# Patient Record
Sex: FEMALE | Race: WHITE | NOT HISPANIC OR LATINO | Employment: OTHER | URBAN - METROPOLITAN AREA
[De-identification: names, ages, dates, MRNs, and addresses within clinical notes are randomized per-mention and may not be internally consistent; named-entity substitution may affect disease eponyms.]

---

## 2017-01-09 ENCOUNTER — COMMUNICATION - HEALTHEAST (OUTPATIENT)
Dept: SCHEDULING | Facility: CLINIC | Age: 82
End: 2017-01-09

## 2019-03-27 ENCOUNTER — AMBULATORY - HEALTHEAST (OUTPATIENT)
Dept: OTHER | Facility: CLINIC | Age: 84
End: 2019-03-27

## 2019-03-28 ENCOUNTER — AMBULATORY - HEALTHEAST (OUTPATIENT)
Dept: OTHER | Facility: CLINIC | Age: 84
End: 2019-03-28

## 2019-03-29 ENCOUNTER — COMMUNICATION - HEALTHEAST (OUTPATIENT)
Dept: NEUROSURGERY | Facility: CLINIC | Age: 84
End: 2019-03-29

## 2019-03-29 DIAGNOSIS — S32.009A FRACTURE OF LUMBAR VERTEBRA (H): ICD-10-CM

## 2019-04-04 ENCOUNTER — RECORDS - HEALTHEAST (OUTPATIENT)
Dept: LAB | Facility: CLINIC | Age: 84
End: 2019-04-04

## 2019-04-05 ENCOUNTER — RECORDS - HEALTHEAST (OUTPATIENT)
Dept: LAB | Facility: CLINIC | Age: 84
End: 2019-04-05

## 2019-04-05 LAB
ANION GAP SERPL CALCULATED.3IONS-SCNC: 9 MMOL/L (ref 5–18)
BUN SERPL-MCNC: 10 MG/DL (ref 8–28)
CALCIUM SERPL-MCNC: 10.4 MG/DL (ref 8.5–10.5)
CHLORIDE BLD-SCNC: 102 MMOL/L (ref 98–107)
CO2 SERPL-SCNC: 24 MMOL/L (ref 22–31)
CREAT SERPL-MCNC: 0.73 MG/DL (ref 0.6–1.1)
ERYTHROCYTE [DISTWIDTH] IN BLOOD BY AUTOMATED COUNT: 13.3 % (ref 11–14.5)
GFR SERPL CREATININE-BSD FRML MDRD: >60 ML/MIN/1.73M2
GLUCOSE BLD-MCNC: 97 MG/DL (ref 70–125)
HCT VFR BLD AUTO: 34 % (ref 35–47)
HGB BLD-MCNC: 11 G/DL (ref 12–16)
MCH RBC QN AUTO: 29.5 PG (ref 27–34)
MCHC RBC AUTO-ENTMCNC: 32.4 G/DL (ref 32–36)
MCV RBC AUTO: 91 FL (ref 80–100)
PLATELET # BLD AUTO: 268 THOU/UL (ref 140–440)
PMV BLD AUTO: 11 FL (ref 8.5–12.5)
POTASSIUM BLD-SCNC: 4.3 MMOL/L (ref 3.5–5)
RBC # BLD AUTO: 3.73 MILL/UL (ref 3.8–5.4)
SODIUM SERPL-SCNC: 135 MMOL/L (ref 136–145)
WBC: 13.1 THOU/UL (ref 4–11)

## 2019-04-07 LAB — BACTERIA SPEC CULT: NORMAL

## 2019-04-12 ENCOUNTER — HOSPITAL ENCOUNTER (OUTPATIENT)
Dept: RADIOLOGY | Facility: CLINIC | Age: 84
Discharge: HOME | End: 2019-04-12
Attending: SURGERY

## 2019-04-12 ENCOUNTER — OFFICE VISIT - HEALTHEAST (OUTPATIENT)
Dept: NEUROSURGERY | Facility: CLINIC | Age: 84
End: 2019-04-12

## 2019-04-12 DIAGNOSIS — S32.001D OPEN BURST FRACTURE OF LUMBAR VERTEBRA WITH ROUTINE HEALING, SUBSEQUENT ENCOUNTER: ICD-10-CM

## 2019-04-12 DIAGNOSIS — S32.009A FRACTURE OF LUMBAR VERTEBRA (H): ICD-10-CM

## 2019-04-22 ENCOUNTER — COMMUNICATION - HEALTHEAST (OUTPATIENT)
Dept: NEUROSURGERY | Facility: CLINIC | Age: 84
End: 2019-04-22

## 2019-04-23 ENCOUNTER — COMMUNICATION - HEALTHEAST (OUTPATIENT)
Dept: NEUROSURGERY | Facility: CLINIC | Age: 84
End: 2019-04-23

## 2019-04-23 ENCOUNTER — AMBULATORY - HEALTHEAST (OUTPATIENT)
Dept: NEUROSURGERY | Facility: CLINIC | Age: 84
End: 2019-04-23

## 2019-04-23 DIAGNOSIS — S32.049A L4 VERTEBRAL FRACTURE (H): ICD-10-CM

## 2019-04-23 DIAGNOSIS — S32.041D CLOSED STABLE BURST FRACTURE OF FOURTH LUMBAR VERTEBRA WITH ROUTINE HEALING, SUBSEQUENT ENCOUNTER: ICD-10-CM

## 2019-04-30 ENCOUNTER — COMMUNICATION - HEALTHEAST (OUTPATIENT)
Dept: NEUROSURGERY | Facility: CLINIC | Age: 84
End: 2019-04-30

## 2019-05-03 ENCOUNTER — COMMUNICATION - HEALTHEAST (OUTPATIENT)
Dept: NEUROSURGERY | Facility: CLINIC | Age: 84
End: 2019-05-03

## 2019-05-03 DIAGNOSIS — S32.041D CLOSED STABLE BURST FRACTURE OF FOURTH LUMBAR VERTEBRA WITH ROUTINE HEALING, SUBSEQUENT ENCOUNTER: ICD-10-CM

## 2019-05-07 ENCOUNTER — HOSPITAL ENCOUNTER (OUTPATIENT)
Dept: CT IMAGING | Facility: HOSPITAL | Age: 84
Discharge: HOME | End: 2019-05-07

## 2019-05-07 ENCOUNTER — HOSPITAL ENCOUNTER (OUTPATIENT)
Dept: MRI IMAGING | Facility: HOSPITAL | Age: 84
Discharge: HOME | End: 2019-05-07

## 2019-05-07 DIAGNOSIS — S32.041D CLOSED STABLE BURST FRACTURE OF FOURTH LUMBAR VERTEBRA WITH ROUTINE HEALING, SUBSEQUENT ENCOUNTER: ICD-10-CM

## 2019-05-09 ENCOUNTER — RECORDS - HEALTHEAST (OUTPATIENT)
Dept: ADMINISTRATIVE | Facility: OTHER | Age: 84
End: 2019-05-09

## 2019-05-09 ENCOUNTER — OFFICE VISIT - HEALTHEAST (OUTPATIENT)
Dept: NEUROSURGERY | Facility: CLINIC | Age: 84
End: 2019-05-09

## 2019-05-09 DIAGNOSIS — M80.88XD OSTEOPOROTIC COMPRESSION FRACTURE OF SPINE WITH ROUTINE HEALING, SUBSEQUENT ENCOUNTER: ICD-10-CM

## 2019-06-03 ENCOUNTER — HOSPITAL ENCOUNTER (OUTPATIENT)
Dept: RADIOLOGY | Facility: HOSPITAL | Age: 84
Discharge: HOME | End: 2019-06-03
Attending: SURGERY

## 2019-06-03 DIAGNOSIS — S32.049A L4 VERTEBRAL FRACTURE (H): ICD-10-CM

## 2019-06-06 ENCOUNTER — OFFICE VISIT - HEALTHEAST (OUTPATIENT)
Dept: NEUROSURGERY | Facility: CLINIC | Age: 84
End: 2019-06-06

## 2019-06-06 DIAGNOSIS — S32.049A CLOSED FRACTURE OF FOURTH LUMBAR VERTEBRA, UNSPECIFIED FRACTURE MORPHOLOGY, INITIAL ENCOUNTER (H): ICD-10-CM

## 2019-06-06 DIAGNOSIS — S32.041D CLOSED STABLE BURST FRACTURE OF FOURTH LUMBAR VERTEBRA WITH ROUTINE HEALING, SUBSEQUENT ENCOUNTER: ICD-10-CM

## 2019-06-30 ENCOUNTER — COMMUNICATION - HEALTHEAST (OUTPATIENT)
Dept: NEUROSURGERY | Facility: CLINIC | Age: 84
End: 2019-06-30

## 2019-06-30 DIAGNOSIS — S32.049A CLOSED FRACTURE OF FOURTH LUMBAR VERTEBRA, UNSPECIFIED FRACTURE MORPHOLOGY, INITIAL ENCOUNTER (H): ICD-10-CM

## 2019-07-15 ENCOUNTER — COMMUNICATION - HEALTHEAST (OUTPATIENT)
Dept: NEUROSURGERY | Facility: CLINIC | Age: 84
End: 2019-07-15

## 2019-08-01 ENCOUNTER — OFFICE VISIT - HEALTHEAST (OUTPATIENT)
Dept: NEUROSURGERY | Facility: CLINIC | Age: 84
End: 2019-08-01

## 2019-08-01 ENCOUNTER — HOSPITAL ENCOUNTER (OUTPATIENT)
Dept: CT IMAGING | Facility: HOSPITAL | Age: 84
Discharge: HOME | End: 2019-08-01

## 2019-08-01 DIAGNOSIS — M81.0 SENILE OSTEOPOROSIS: ICD-10-CM

## 2019-08-01 DIAGNOSIS — S32.049A CLOSED FRACTURE OF FOURTH LUMBAR VERTEBRA, UNSPECIFIED FRACTURE MORPHOLOGY, INITIAL ENCOUNTER (H): ICD-10-CM

## 2019-08-01 DIAGNOSIS — S32.041D CLOSED STABLE BURST FRACTURE OF FOURTH LUMBAR VERTEBRA WITH ROUTINE HEALING, SUBSEQUENT ENCOUNTER: ICD-10-CM

## 2019-08-01 ASSESSMENT — MIFFLIN-ST. JEOR: SCORE: 831.44

## 2019-08-02 ENCOUNTER — COMMUNICATION - HEALTHEAST (OUTPATIENT)
Dept: NEUROSURGERY | Facility: CLINIC | Age: 84
End: 2019-08-02

## 2019-08-02 ENCOUNTER — AMBULATORY - HEALTHEAST (OUTPATIENT)
Dept: NEUROSURGERY | Facility: CLINIC | Age: 84
End: 2019-08-02

## 2019-08-02 DIAGNOSIS — S32.049A: ICD-10-CM

## 2019-11-07 ENCOUNTER — RECORDS - HEALTHEAST (OUTPATIENT)
Dept: ADMINISTRATIVE | Facility: OTHER | Age: 84
End: 2019-11-07

## 2019-12-27 ENCOUNTER — RECORDS - HEALTHEAST (OUTPATIENT)
Dept: ADMINISTRATIVE | Facility: OTHER | Age: 84
End: 2019-12-27

## 2019-12-30 ENCOUNTER — RECORDS - HEALTHEAST (OUTPATIENT)
Dept: ADMINISTRATIVE | Facility: OTHER | Age: 84
End: 2019-12-30

## 2020-10-22 ENCOUNTER — COMMUNICATION - HEALTHEAST (OUTPATIENT)
Dept: SCHEDULING | Facility: CLINIC | Age: 85
End: 2020-10-22

## 2020-10-23 ENCOUNTER — HOME CARE/HOSPICE - HEALTHEAST (OUTPATIENT)
Dept: HOME HEALTH SERVICES | Facility: HOME HEALTH | Age: 85
End: 2020-10-23

## 2020-10-23 ENCOUNTER — COMMUNICATION - HEALTHEAST (OUTPATIENT)
Dept: NEUROSURGERY | Facility: CLINIC | Age: 85
End: 2020-10-23

## 2020-10-23 DIAGNOSIS — S22.089A CLOSED T11 FRACTURE (H): ICD-10-CM

## 2021-02-09 ENCOUNTER — COMMUNICATION - HEALTHEAST (OUTPATIENT)
Dept: SCHEDULING | Facility: CLINIC | Age: 86
End: 2021-02-09

## 2021-02-12 ENCOUNTER — HOME CARE/HOSPICE - HEALTHEAST (OUTPATIENT)
Dept: HOME HEALTH SERVICES | Facility: HOME HEALTH | Age: 86
End: 2021-02-12

## 2021-03-19 ENCOUNTER — COMMUNICATION - HEALTHEAST (OUTPATIENT)
Dept: ADMINISTRATIVE | Facility: CLINIC | Age: 86
End: 2021-03-19

## 2021-03-24 ENCOUNTER — COMMUNICATION - HEALTHEAST (OUTPATIENT)
Dept: SCHEDULING | Facility: CLINIC | Age: 86
End: 2021-03-24

## 2021-04-16 ENCOUNTER — RECORDS - HEALTHEAST (OUTPATIENT)
Dept: ADMINISTRATIVE | Facility: OTHER | Age: 86
End: 2021-04-16

## 2021-05-09 ENCOUNTER — HOSPITAL ENCOUNTER (OUTPATIENT)
Dept: CT IMAGING | Facility: HOSPITAL | Age: 86
Discharge: HOME | End: 2021-05-09
Attending: UROLOGY
Payer: COMMERCIAL

## 2021-05-09 DIAGNOSIS — N20.1 URETERIC STONE: ICD-10-CM

## 2021-05-09 DIAGNOSIS — N20.1 CALCULUS OF URETER: ICD-10-CM

## 2021-05-27 ENCOUNTER — RECORDS - HEALTHEAST (OUTPATIENT)
Dept: ADMINISTRATIVE | Facility: CLINIC | Age: 86
End: 2021-05-27

## 2021-05-27 NOTE — PROGRESS NOTES
"Patient was seen at [place] [facility] for the evaluation for a custom TLSO.  Patient presents as a 82 y/o female, 5'1\", 88 lbs. Dx: L4 burst fracture.  Rx for custom TLSO ordered by Dr. Tracey Miller MD.  Measurements were taken while supine in bed without incident and TLSO will be ready to fit tomorrow morning (3/28).     We are experiencing issues with receiving orders via fax currently.  The issue begin yesterday and has continued into this morning.  [employer] is currently working to resolve this issue. Please confirm orders via email or phone until issue is resolved. Thank you.   "

## 2021-05-27 NOTE — PROGRESS NOTES
CHART NOTE     1935     DATE OF SERVICE: 4/12/2019     FOLLOW UP EVALUATION:      ASSESSMENT AND PLAN:  Shirley Koehler is an 83-year-old female with a past medical history significant for hypertension, GERD, aortic stenosis, and depression who presented to the emergency department for evaluation of low back and right hip pain on March 27, 2019.  Imaging revealed a L4 burst fracture, she was placed in a brace.  Today, she presents un accompanied w/o a coat--it's snowing out.  She reports minimal pain, reports wearing brace. She does report occasional right leg pain, but she does not want any work up. She's at TCU. Her imaging and exam are stable. Plan follow up in 6 weeks w repeat imaging, she will continue brace when upright or ambulatory.  I called her son Jay to let him know that she had arrived w/o appropriate outer wear reflective of weather, and recommended that a family member accompany her to her appointments.         PAST MEDICAL HISTORY, SURGICAL HISTORY, REVIEW OF SYMPTOMS, MEDICATIONS AND ALLERGIES:  Past medical history, surgical history, review of symptoms, medications and allergies remain unchanged.       PHYSICAL EXAM:  Neurological exam reveals:  Alert and oriented x3, speech fluent and appropriate.   PERRL, EOMI, face symmetric, tongue midline, shoulder shrug equal  No pronator drift, finger to nose smooth and accurate bilaterally  Arm strength bilateral grasp, biceps, triceps, and deltoids 5/5   Leg strength bilateral dorsiflexion, plantar flexion, and hip flexion 5/5  Muscle Bulk and tone wnl.   Reflexes:No pathological reflexes   Gait and station: She is ambulatory w walker        RADIOGRAPHIC IMAGING: I personally reviewed any new diagnostic studies.   There is unchanged 22 degrees dextro convex curvature from L1-L4. Unchanged appearance of the L1 compression fracture. Unchanged compression fracture of the inferior bony endplate of L4.

## 2021-05-27 NOTE — PROGRESS NOTES
"S: Patient is a 82 y/o female, 5'1\", 88 lbs seen today at [facility], room 413, for the fitting and delivery of her custom TLSO on orders from Dr. Tracey Miller MD for the treatment of Dx: L4 burst fracture.    O/G: Patient presents supine in bed with pain managed. Custom TLSO is designed to provide support for fracture, tri-planar control, increase intrabdominal pressure, and facilitate the healing process. Patient requires customization due to the severity of her burst fracture.    A: I fit the patient while she remained supine on her hospital bed. Patient was log-rolled with the aid of hospital staff in order to properly don her custom TLSO. Patient's TLSO fit was optimal after donning was complete. Patient and I discussed care and use of her TLSO during our appointment today and she was provided written care, use and donning instructions from the . Patient reported that she understood the instructions she was given and that all of her questions were answered during the course of our visit today.     P: Patient was given my card and asked to call our office if there are any issues or questions regarding the fit/function of her custom TLSO in the future.   "

## 2021-05-28 NOTE — TELEPHONE ENCOUNTER
PT's nurse called with the question about TLSO brace. PT wondering if she needs to put the brace on at night when she is getting up to go to the bathroom. Please call Erika her nurse back @ [phone]

## 2021-05-28 NOTE — TELEPHONE ENCOUNTER
PT called requesting for pain medication as she is in a lot of pain from her FX. Please call the PT back @ [phone]

## 2021-05-28 NOTE — TELEPHONE ENCOUNTER
HC nurse called requesting refill of Dilaudid.  She was last seen by me on 4/12--at that time she reported minimal pain.  I spent over a hour w her the day of her visit--walked her --she denied pain.   She called on 4/26 and was given a rx for 25 Dilaudid--I do not see a note from the provider that provided refill.  Review of her  is concerning for opioid abuse given the fact that she has a significant depression hx.   I spoke to the patient w Kady caldera.  She was intermittently laughing and crying.  She reported severe pain in the am w getting up.  She reports right sided pain.  She denies worsening weakness. No issues w bowel or bladder.  She has been dc'd from TCU .  I told her that I would provide a conservative number of Dilaudid until she can be seen by us.  She needs to follow up w us next week w CT lumbar spine and MRI w/o lumbar spine to assess right leg pain.  If her pain is such that she's requiring this much Dilaudid at this point, she would be a candidate for vertebroplasty.

## 2021-05-28 NOTE — PROGRESS NOTES
CHART NOTE     1935     DATE OF SERVICE: 5/11/2019     FOLLOW UP EVALUATION:      ASSESSMENT AND PLAN:Shirley Koehler is an 83-year-old female with a past medical history significant for hypertension, GERD, aortic stenosis, and depression who presented to the emergency department for evaluation of low back and right hip pain on March 27, 2019.  Imaging revealed a new L4 burst fracture, she was placed in a brace.   During her last visit she reported occasional right leg pain, but she refused any work up.  She reported minimal back pain and compliance w brace. Her imaging and exam were stable. A few days later she called our office reporting pain and was provided a RX for Dilaudid by a colleague.  She called a week later asking for another refill of Dilaudid.  I provided small amt and told her that she needed imaging and to be seen in follow up. I ordered MRI and CT and follow up.  She presents today accompanied by her son.  Ms Phipps reports severe right groin pain, worse in the am and w ambulation.  She has a tough time getting up and going.  She's perseverating about getting a refill of Dilaudid, it's difficult to get any hx, as she only wants to talk about refill of Dilaudid.  I rev'd her imaging w Dr Hamilton. She has the old L1 fx ,which is stable--although the foramen looks  tight on MRI--on CT which provides a better look at the foramen, she is not. At L 4, the site of her most recent fracture there is bilateral foraminal stenosis worse on the right, but her pain is not in a L 4 distribution. The L 4 fracture is stable in alignment--on CT little evidence of healing. 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 I feel that her symptoms are most consistent w the right hip degeneration.  I spent focused time examining her back, she had no pain with point pressure ot light thumping on her spine.  I discussed with the patient and her son that I could not continue to provide pain medication for a condition that I was  not treating ie the hip, and that she would be better served to see orthopedic provider.  I explained that taking Dilaudid is not a long term solution.  I called her primary doctor Dr Thomosn, I discussed my concerns and my limitations to treat something for which I'm not qualified to treat(her hip). Dr Thomson and I both agreed that patient w significant addition risk due to her depression and ongoing emotional distraught regarding the death of her son. Dr Thomson will facilitate follow up w patient. She will continue to follow up w us for her L 4 fracture, we will see her back in 6 weeks w repeat imaging.          PAST MEDICAL HISTORY, SURGICAL HISTORY, REVIEW OF SYMPTOMS, MEDICATIONS AND ALLERGIES:  Past medical history, surgical history, review of symptoms, medications and allergies remain unchanged.    Vitals:    05/09/19 1100   BP: 118/74   Pulse: 68   Resp: 18        PHYSICAL EXAM:  Neurological exam reveals:  Alert and oriented x3, speech fluent and appropriate.   PERRL, EOMI, face symmetric, tongue midline, shoulder shrug equal  No pronator drift, finger to nose smooth and accurate bilaterally  Arm strength bilateral grasp, biceps, triceps, and deltoids 5/5   Leg strength bilateral dorsiflexion, plantar flexion, and hip flexion 5/5  Muscle Bulk and tone wnl.   Reflexes:No pathological reflexes   Gait and station:Pain w standing--unsteady gait.      RADIOGRAPHIC IMAGING: I personally reviewed any new diagnostic studies.   No significant change in the severe compression fracture of L1 with stable 4 mm retropulsion of the posterior superior endplate of L1 into the spinal canal. Mild spinal canal narrowing at T12-L1. Stable 26 degrees of segmental kyphosis from T12 to L2.   2.  Approximately 30% compression fracture of inferior endplate of L4, slightly worsened since prior lumbar spine CT 03/26/2019. The fracture plane is still present, suggestive of ongoing or nonhealed fracture. There is 2 mm retropulsion of the  posterior  inferior endplate of L4 into the spinal canal.   3.  Mild spinal canal narrowing at L4-L5. Narrowing of the left lateral recess at L3-L4.  4.  Moderate right and mild to moderate left neural foraminal narrowing at L4-L5. Mild left neural foraminal narrowing at L3-L4.

## 2021-05-28 NOTE — TELEPHONE ENCOUNTER
Shirley was last seen on 4/12/2019 for L4 fracture. She is requesting another hydromorphone prescription as she is still experiencing a moderate mid back pain associated with standing and walking. Takes 1-2 hydromorphone a day. Can not take hydrocodone or Tramadol.   Will send Rx to [facility].  Kady Ly RN, CNRN

## 2021-05-28 NOTE — TELEPHONE ENCOUNTER
Nurse calling on behalf of PT asking questions about how the brace is being put on. Please call the PT's son, jonny Murray @ [phone]. If he does not answer please call the nurse, Leslie @ [phone].

## 2021-05-28 NOTE — TELEPHONE ENCOUNTER
Left another message for pt's son to call us back.   Was unable to leave message for Leslie.  Kady Ly RN, CNRN

## 2021-05-28 NOTE — TELEPHONE ENCOUNTER
PT called to set up F/U and there is instruction to have repeat imaging prior to appt but no orders placed. Please review.

## 2021-05-29 NOTE — PROGRESS NOTES
CHART NOTE     1935     DATE OF SERVICE: 6/6/2019     FOLLOW UP EVALUATION:      ASSESSMENT AND PLAN: Some mid low thoracic back pain today, low back pain seems improved, she complains of no right groin pain or leg pain for me. She would like to know what else she can take that is stronger than ibuprofen for pain. Her son is present.     Plan:  1. She can increase her gabapentin to 200mg three times a day, monitor for side effects  2. Would continue the brace given her mid thoracic back pain and get a CT on 4 week follow up, if she has a thoracic fracture the treatment would be the same as her L4 fracture  3. CT of thoracic and lumbar spine in four weeks, appt in trauma clinic to follow  4. If in four weeks back pain is minimal, no new issues on CT, some interval sclerosis or healing - hopefully we can discharge her from the clinic then  5. I told her we would not prescribe more narcotics, she should be taking tylenol 1000mg three times a day, ibuprofen sparingly, ice/heat, lidocaine patches. She seems disappointed I won't refill narcotics but her son is in agreement  6. Recommend continued discussion with patient regarding narcotics are not indicated for long term treatment of pain.     Interval events: No low back pain, chronic hip and knee pain. Complaining of some mid lower thoracic pain. In clinic today she seems a little anxious. She describes she was up all night with the pain, has been taking ibuprofen which has not been very helpful. She would like something else for pain. She has seen her pcp and has described the same issues. PCP notes reviewed. PCP notes will not refill narcotics and I have told her we will not either.     HPI: Shirley Koehler is an 83-year-old female with a past medical history significant for hypertension, GERD, aortic stenosis, and depression who presented to the emergency department for evaluation of low back and right hip pain on March 27, 2019.  Imaging revealed a new L4  burst fracture, she was placed in a brace.   During her last visit she reported severe right hip and right knee pain. She has not yet seen [facility] for this. Her son has been staying with her and this has been helpful.         PAST MEDICAL HISTORY, SURGICAL HISTORY, REVIEW OF SYMPTOMS, MEDICATIONS AND ALLERGIES:  Past medical history, surgical history, review of symptoms, medications and allergies remain unchanged.    Vitals:    06/06/19 1119   BP: 159/85   Pulse: 87   Resp: 18   SpO2: 98%        PHYSICAL EXAM:  Neurological exam reveals:  Alert and oriented x3, speech fluent and appropriate.   PERRL, EOMI, face symmetric, tongue midline, shoulder shrug equal  No pronator drift, finger to nose smooth and accurate bilaterally  Arm strength bilateral grasp, biceps, triceps, and deltoids 5/5   Leg strength bilateral dorsiflexion, plantar flexion, and hip flexion 5/5  Muscle Bulk and tone wnl.   Reflexes:No pathological reflexes   Gait and station: normal with walker.     RADIOGRAPHIC IMAGING: I personally reviewed any new diagnostic studies.      EXAM: XR LUMBAR SPINE 2 OR 3 VWS  LOCATION: [facility]  DATE/TIME: 6/3/2019 11:17 AM     INDICATION: Follow-up L4 compression fracture.  COMPARISON: MRI 5/7/2019.     FINDINGS: 5 lumbar type vertebral bodies. Generalized osteopenia. Films upright in brace. Stable mild compression deformity along the inferior bony endplate of L4. Stable severe L1 compression fracture with associated accentuated thoracolumbar kyphosis.   Stable moderate right lumbar curvature.    Clara Mitchell [facility]surgery  O: [phone]  P: [phone]

## 2021-05-29 NOTE — PATIENT INSTRUCTIONS - HE
Since you still have back pain, I would recommend continuing your brace and restrictions. We will send for a CT scan in four weeks to look for interval healing of your L4 fracture. You can try magnesium and turmeric for pain or lidocaine patches or cream, ice or heat. Tylenol 1000mg three times a day. You can increase your gabapentin to 200mg three times a day. I would not recommend narcotics for pain at this point.       WEARING THE LUMBAR BRACE:    * Wear the brace when out of bed or sitting upright in bed.  * You should apply the brace before standing.  * You may shower seated without brace.   Sit down on shower chair, remove brace   Shower   Dry   Re-apply brace before standing.   Take care not to fall  *If your brace is not fitting call [facility] [phone]  *Check  your incision and the skin under your brace at least daily.

## 2021-05-30 ENCOUNTER — RECORDS - HEALTHEAST (OUTPATIENT)
Dept: ADMINISTRATIVE | Facility: CLINIC | Age: 86
End: 2021-05-30

## 2021-05-30 NOTE — TELEPHONE ENCOUNTER
7/16/19 LMTCB. If she returns call in time, Dr. Marc still has a couple of appts on Thursday's (7/18/19) trauma clinic. We can offer her one of those appts, otherwise, next trauma clinic is 8/1/19.

## 2021-05-30 NOTE — TELEPHONE ENCOUNTER
KYAW cronin f/u in trauma clinic for discussion of CT scan and next step of treatment.  Kady Ly RN, CNRN

## 2021-05-31 NOTE — PROGRESS NOTES
Shriley is here to f/u on new imaging regarding her fracture. She is currently not wearing a brace because she was running late today. She does c/o some back pain, bilateral hip pain and right knee pain. She states then pain is nothing that she cant handle.   Ton,CHIDI

## 2021-05-31 NOTE — PATIENT INSTRUCTIONS - HE
Pt has graduated from Neurosurgery.   Pt was offered in home PT for balance and gait training. Pt is going to think about it. She can call our office at [phone] if she would like to proceed and we can order it.   Dr. Marc will refill gabapentin or thigh pain.   Driving assesment information given.

## 2021-05-31 NOTE — TELEPHONE ENCOUNTER
Gabapentin went to mail order pharmacy. Writer sent new prescription to [facility] in [place] as patient requested. Mail order Rx cancelled.  Patient has been called and verbalized understanding  Ayleen Kee LPN

## 2021-05-31 NOTE — TELEPHONE ENCOUNTER
Patient saw Dr. Marc yesterday and was told a prescription would be sent to the [facility] in [place]. Patients states it is not there. Please call her back at [phone]

## 2021-05-31 NOTE — PROGRESS NOTES
Neurosurgery Progress Note  08/01/19    A/P: Shirley Phipps is a 83 y.o. female who has a chronic L1 burst fracture, evidence of osteoporosis and experienced a ground-level fall in late March 2019 with subsequent development of a L4 burst fracture    Refill gabapentin  Offered in-home physical therapy for balance and gait training, however patient is not ready to commit and she will contact us if she like to proceed.  Offered occupational therapy assessment for resuming driving    L4 burst fracture stable on CT.  No follow-up with neurosurgery as required for her fracture.  However she does have a component of possible lumbar radiculopathy, for which she should call us or return to clinic if she is having worsening symptoms.  At present, she like to continue with oral pain medication for treatment    S: Ms. Phipps returns to clinic today with complaints of her right hip and right knee bothering her.  There is a small component of radiating pain.  She continues to take ibuprofen and gabapentin.  She denies any new numbness, tingling, weakness.    PMH: No interval change  PSH: No interval change    ROS: Denies any changes in her bowel or bladder habits. A full 14 point review of systems was otherwise completed and is negative aside from that mentioned above in the HPI    O:  Vitals:    08/01/19 1253   BP: 134/78   Pulse: 74   SpO2: 97%     General: Awake, alert, NAD  HEENT: AT/NC, EOMI, face symmetric, oral mucosa moist and pink  Heart: RRR  Lungs: Nonlabored respirations without accessory muscle use.  Neuro: Awake, alert, speech is clear and content is appropriate. MAEW x 4 with full strength in b/l UE and LE. Sensation is intact to temperature and LT. vibration and pinprick sensation is intact in the bilateral lower extremities  Coordination:   Reflexes: 2+ patellar reflexes.  Unable to elicit Achilles. No clonus. Toes downgoing bilaterally.  Musculoskeletal: Negative straight leg raise bilaterally  Psych:  Appropriate mood and affect, pleasant, cooperative, alert and oriented x 3  Skin: No obvious rash or lesion    I personally reviewed and visualized the following radiographic images:  Lumbar spine CT 8/1/2019: Chronic L1 burst fracture.  Stable L4 burst fracture with approximately 30% height loss.  No new development of acute fracture.    Zoey Marc MD  08/01/19

## 2021-06-03 VITALS — HEIGHT: 61 IN | BODY MASS INDEX: 18.69 KG/M2 | WEIGHT: 99 LBS

## 2021-06-12 NOTE — TELEPHONE ENCOUNTER
PATIENT NAME:  Shirley Phipps  YOB: 1935  MRN: [id number]  SURGEON: Dr. Marc  DATE of CONSULT: 10/21/2020  Consult for T11 fracture    FOLLOW-UP PLAN:    Hospital Follow Up Visit: 2 weeks  Provider: NP on Dr. Marc clinic day    DIAGNOSTICS:  XR  DISPOSITION:  TCU    ADDITIONAL INSTRUCTIONS FOR MEDICAL STAFF:      Kady Ly RN, CNRN

## 2021-06-21 NOTE — LETTER
Letter by Laura Robles MD at      Author: Laura Robles MD Service: -- Author Type: --    Filed:  Encounter Date: 3/19/2021 Status: (Other)         Shirley Phipps  [address]  [place] [address]      March 19, 2021      Dear Shirley,    This letter is to remind you that were due for your follow up appointment with Dr. Laura Robles in February, 2020. To help ensure you are in the best health possible, a regular follow-up with your cardiologist is essential.     Please call our Patient Scheduling Line at [phone] to schedule your appointment at your earliest convenience.  If you have recently scheduled an appointment, please disregard this letter.    We look forward to seeing you again. As always, we are available at the number  above for any questions or concerns you may have.      Sincerely,     The Physicians and Staff of [facility]

## 2021-07-03 ENCOUNTER — HEALTH MAINTENANCE LETTER (OUTPATIENT)
Age: 86
End: 2021-07-03

## 2021-07-03 NOTE — ADDENDUM NOTE
Addendum Note by Clara Phelan CNP at 6/6/2019 11:00 AM     Author: Clara Phelan CNP Service: -- Author Type: Nurse Practitioner    Filed: 6/6/2019  1:41 PM Encounter Date: 6/6/2019 Status: Signed    : Clara Phelan CNP (Nurse Practitioner)    Addended by: CLARA PHELAN on: 6/6/2019 01:41 PM        Modules accepted: Orders

## 2021-07-17 ENCOUNTER — HOSPITAL ENCOUNTER (EMERGENCY)
Facility: HOSPITAL | Age: 86
Discharge: HOME | End: 2021-07-17
Attending: EMERGENCY MEDICINE | Admitting: EMERGENCY MEDICINE
Payer: COMMERCIAL

## 2021-07-17 ENCOUNTER — HOSPITAL ENCOUNTER (EMERGENCY)
Dept: CT IMAGING | Facility: HOSPITAL | Age: 86
End: 2021-07-17
Attending: EMERGENCY MEDICINE
Payer: COMMERCIAL

## 2021-07-17 VITALS
WEIGHT: 95 LBS | BODY MASS INDEX: 17.48 KG/M2 | HEART RATE: 88 BPM | TEMPERATURE: 99.3 F | DIASTOLIC BLOOD PRESSURE: 67 MMHG | SYSTOLIC BLOOD PRESSURE: 145 MMHG | OXYGEN SATURATION: 96 % | HEIGHT: 62 IN | RESPIRATION RATE: 20 BRPM

## 2021-07-17 DIAGNOSIS — N20.1 URETEROLITHIASIS: ICD-10-CM

## 2021-07-17 DIAGNOSIS — N39.0 URINARY TRACT INFECTION WITHOUT HEMATURIA, SITE UNSPECIFIED: ICD-10-CM

## 2021-07-17 LAB
ALBUMIN SERPL-MCNC: 4.1 G/DL (ref 3.5–5)
ALBUMIN UR-MCNC: 70 MG/DL
ALP SERPL-CCNC: 56 U/L (ref 45–120)
ALT SERPL W P-5'-P-CCNC: <9 U/L (ref 0–45)
ANION GAP SERPL CALCULATED.3IONS-SCNC: 10 MMOL/L (ref 5–18)
APPEARANCE UR: ABNORMAL
AST SERPL W P-5'-P-CCNC: 16 U/L (ref 0–40)
BASOPHILS # BLD MANUAL: 0 10E3/UL (ref 0–0.2)
BASOPHILS NFR BLD MANUAL: 0 %
BILIRUB DIRECT SERPL-MCNC: 0.3 MG/DL
BILIRUB SERPL-MCNC: 1 MG/DL (ref 0–1)
BILIRUB UR QL STRIP: NEGATIVE
BUN SERPL-MCNC: 16 MG/DL (ref 8–28)
C REACTIVE PROTEIN LHE: 0.7 MG/DL (ref 0–0.8)
CALCIUM SERPL-MCNC: 10.1 MG/DL (ref 8.5–10.5)
CHLORIDE BLD-SCNC: 110 MMOL/L (ref 98–107)
CO2 SERPL-SCNC: 22 MMOL/L (ref 22–31)
COLOR UR AUTO: YELLOW
CREAT SERPL-MCNC: 0.83 MG/DL (ref 0.6–1.1)
EOSINOPHIL # BLD MANUAL: 0 10E3/UL (ref 0–0.7)
EOSINOPHIL NFR BLD MANUAL: 0 %
ERYTHROCYTE [DISTWIDTH] IN BLOOD BY AUTOMATED COUNT: 13.4 % (ref 10–15)
GFR SERPL CREATININE-BSD FRML MDRD: 65 ML/MIN/1.73M2
GLUCOSE BLD-MCNC: 92 MG/DL (ref 70–125)
GLUCOSE UR STRIP-MCNC: NEGATIVE MG/DL
HCT VFR BLD AUTO: 37.3 % (ref 35–47)
HGB BLD-MCNC: 11.8 G/DL (ref 11.7–15.7)
HGB UR QL STRIP: ABNORMAL
KETONES UR STRIP-MCNC: NEGATIVE MG/DL
LACTATE SERPL-SCNC: 1.4 MMOL/L (ref 0.7–2)
LEUKOCYTE ESTERASE UR QL STRIP: ABNORMAL
LIPASE SERPL-CCNC: <9 U/L (ref 0–52)
LYMPHOCYTES # BLD MANUAL: 1.7 10E3/UL (ref 0.8–5.3)
LYMPHOCYTES NFR BLD MANUAL: 14 %
MCH RBC QN AUTO: 29.2 PG (ref 26.5–33)
MCHC RBC AUTO-ENTMCNC: 31.6 G/DL (ref 31.5–36.5)
MCV RBC AUTO: 92 FL (ref 78–100)
MONOCYTES # BLD MANUAL: 0.7 10E3/UL (ref 0–1.3)
MONOCYTES NFR BLD MANUAL: 6 %
NEUTROPHILS # BLD MANUAL: 9.6 10E3/UL (ref 1.6–8.3)
NEUTROPHILS NFR BLD MANUAL: 80 %
NITRATE UR QL: NEGATIVE
PH UR STRIP: 6.5 [PH] (ref 5–7)
PLAT MORPH BLD: ABNORMAL
PLATELET # BLD AUTO: 114 10E3/UL (ref 150–450)
POTASSIUM BLD-SCNC: 4.3 MMOL/L (ref 3.5–5)
PROT SERPL-MCNC: 7.1 G/DL (ref 6–8)
RBC # BLD AUTO: 4.04 10E6/UL (ref 3.8–5.2)
RBC MORPH BLD: ABNORMAL
RBC URINE: 10 /HPF
SODIUM SERPL-SCNC: 142 MMOL/L (ref 136–145)
SP GR UR STRIP: 1.01 (ref 1–1.03)
UROBILINOGEN UR STRIP-MCNC: <2 MG/DL
WBC # BLD AUTO: 12 10E3/UL (ref 4–11)
WBC CLUMPS #/AREA URNS HPF: PRESENT /HPF
WBC URINE: >182 /HPF

## 2021-07-17 PROCEDURE — 85027 COMPLETE CBC AUTOMATED: CPT | Performed by: EMERGENCY MEDICINE

## 2021-07-17 PROCEDURE — 86141 C-REACTIVE PROTEIN HS: CPT | Performed by: EMERGENCY MEDICINE

## 2021-07-17 PROCEDURE — 83605 ASSAY OF LACTIC ACID: CPT | Performed by: EMERGENCY MEDICINE

## 2021-07-17 PROCEDURE — 82248 BILIRUBIN DIRECT: CPT | Performed by: EMERGENCY MEDICINE

## 2021-07-17 PROCEDURE — 81001 URINALYSIS AUTO W/SCOPE: CPT | Performed by: EMERGENCY MEDICINE

## 2021-07-17 PROCEDURE — 36415 COLL VENOUS BLD VENIPUNCTURE: CPT | Performed by: EMERGENCY MEDICINE

## 2021-07-17 PROCEDURE — 99285 EMERGENCY DEPT VISIT HI MDM: CPT | Mod: 25

## 2021-07-17 PROCEDURE — 74176 CT ABD & PELVIS W/O CONTRAST: CPT

## 2021-07-17 PROCEDURE — 96365 THER/PROPH/DIAG IV INF INIT: CPT

## 2021-07-17 PROCEDURE — 87086 URINE CULTURE/COLONY COUNT: CPT | Performed by: EMERGENCY MEDICINE

## 2021-07-17 PROCEDURE — 250N000011 HC RX IP 250 OP 636: Performed by: EMERGENCY MEDICINE

## 2021-07-17 PROCEDURE — 36592 COLLECT BLOOD FROM PICC: CPT | Performed by: EMERGENCY MEDICINE

## 2021-07-17 PROCEDURE — 80048 BASIC METABOLIC PNL TOTAL CA: CPT | Performed by: EMERGENCY MEDICINE

## 2021-07-17 PROCEDURE — 83690 ASSAY OF LIPASE: CPT | Performed by: EMERGENCY MEDICINE

## 2021-07-17 RX ORDER — CIPROFLOXACIN 2 MG/ML
400 INJECTION, SOLUTION INTRAVENOUS ONCE
Status: COMPLETED | OUTPATIENT
Start: 2021-07-17 | End: 2021-07-17

## 2021-07-17 RX ORDER — CIPROFLOXACIN 500 MG/1
500 TABLET, FILM COATED ORAL 2 TIMES DAILY
Qty: 14 TABLET | Refills: 0 | Status: SHIPPED | OUTPATIENT
Start: 2021-07-17 | End: 2021-07-19 | Stop reason: ALTCHOICE

## 2021-07-17 RX ADMIN — CIPROFLOXACIN 400 MG: 2 INJECTION, SOLUTION INTRAVENOUS at 13:26

## 2021-07-17 ASSESSMENT — MIFFLIN-ST. JEOR: SCORE: 829.17

## 2021-07-17 NOTE — ED TRIAGE NOTES
Pt has hx of kidney stone (retained) and sepsis. Ate last evening and vomited. Nauseated and bloated this am. Pain and urgency with urination. Felt febrile at home.

## 2021-07-17 NOTE — ED PROVIDER NOTES
"EMERGENCY DEPARTMENT ENCOUNTER      NAME: Shirely Phipps  AGE: 85 year old female  YOB: 1935  MRN: [id number]  EVALUATION DATE & TIME: 7/17/2021  9:23 AM    PCP: Desirae Thomson    ED PROVIDER: Maurilio Teran D.O.      Chief Complaint   Patient presents with     Rule out Urinary Tract Infection         HPI  Patient information was obtained from: Patient  Use of : N/A      Shirley Phipps is a 85 year old female with a history of UTI, nephrolithiasis, HTN, HLD, and chronic pain syndrome, who presents to the ED by private vehicle for evaluation of dysuria and fever.     The patient report she awoke yesterday evening with emesis and diarrhea and then a fever this morning, which concerned her due to having a fever with prior sepsis. She also has dysuria, urinary urgency, nausea, and abdominal pain with distension. Patient also suspects she has a kidney stones that is \"not moving.\"     She otherwise denies any chest pain, dyspnea, cough, congestion, vision changes, numbness/tingling or weakness in extremities, or additional medical concerns or complaints at this time. Of note, she has an allergy to codeine.     Per chart review, patient was last given a 10-day course of Augmentin on 06/09/21 for an UTI. CT abdomen/pelvis showed a \"4 mm stone in the distal right ureter remains unchanged in position from previous examination. No dilatation of the right ureter or right renal collecting system.\"     REVIEW OF SYSTEMS  Constitutional:  Positive for fever. Denies weight loss or weakness  Eyes:  No pain, discharge, redness  HENT:  Denies sore throat, ear pain, congestion  Respiratory: No SOB, wheeze or cough  Cardiovascular:  No CP, palpitations  GI:  Positive for abdominal pain, nausea, vomiting, and diarrhea.   : Positive for dysuria and urgency.   Musculoskeletal:  Denies any new muscle/joint pain, swelling or loss of function.  Skin:  Denies rash, pallor  Neurologic:  Denies headache, focal " weakness or sensory changes  Lymph: Denies swollen nodes    All other systems negative unless noted in HPI.    PAST MEDICAL HISTORY:  Past Medical History:   Diagnosis Date     Anemia      Aortic stenosis      Arthritis      Bicuspid aortic valve      Bladder infection      Cancer (H)     SQUAMOUS 2X     Closed burst fracture of lumbar vertebra (H) 03/26/2019    L4 BURST FX WITH RETROPULSION     Dyslipidemia      Fall 03/26/2019     GERD (gastroesophageal reflux disease)      History of transfusion     SHE DOESN'T THINK SHE HAD A TRANSFUSION REACTION     Hypertension        PAST SURGICAL HISTORY:  Past Surgical History:   Procedure Laterality Date     C RECONSTR TOTAL SHOULDER IMPLANT Left 12/26/2016    Procedure: LEFT REVERSE TOTAL SHOULDER ARTHROPLASTY;  Surgeon: Maurilio Marcelino MD;  [place] [facility] [place]oped[place]     OTHER SURGICAL HISTORY Right     surgery for prior wrist fracture     OTHER SURGICAL HISTORY Right     surgery for prior knee fracture     PIC  12/25/2016            CURRENT MEDICATIONS:    No current facility-administered medications for this encounter.     Current Outpatient Medications   Medication     ciprofloxacin (CIPRO) 500 MG tablet     cyclobenzaprine (FLEXERIL) 5 MG tablet     ferrous sulfate 325 (65 FE) MG tablet     gabapentin (NEURONTIN) 100 MG capsule     gabapentin (NEURONTIN) 100 MG capsule     ibuprofen (ADVIL,MOTRIN) 200 MG tablet     lidocaine 4 % patch     mecobalamin, vitamin B12, 1,000 mcg TbDL     multivitamin with minerals (THERA-M) 9 mg iron-400 mcg Tab tablet     oxybutynin (DITROPAN) 5 MG tablet     senna-docusate (SENNOSIDES-DOCUSATE SODIUM) 8.6-50 mg tablet     tamsulosin (FLOMAX) 0.4 mg cap         ALLERGIES:  Allergies   Allergen Reactions     Cefdinir Hives     Clindamycin Nausea     C.Diff     Codeine Nausea and Vomiting     Tolerates oral morphine.     Flagyl [Metronidazole] Angioedema     Sulfa (Sulfonamide Antibiotics) [Sulfa Drugs] Nausea      Canker sores     Tramadol Unknown     Upset stomach       FAMILY HISTORY:  Family History   Problem Relation Age of Onset     Diabetes Type 2  Other      Breast Cancer Mother      Diabetes Father      Diabetes Brother        SOCIAL HISTORY:  Social History     Socioeconomic History     Marital status:      Spouse name: Not on file     Number of children: Not on file     Years of education: Not on file     Highest education level: Not on file   Occupational History     Not on file   Tobacco Use     Smoking status: Never Smoker     Smokeless tobacco: Never Used   Substance and Sexual Activity     Alcohol use: Yes     Alcohol/week: 7.0 standard drinks     Comment: Alcoholic Drinks/day: 1 shot of bee daily     Drug use: No     Sexual activity: Not on file   Other Topics Concern     Not on file   Social History Narrative     Not on file     Social Determinants of Health     Financial Resource Strain:      Difficulty of Paying Living Expenses:    Food Insecurity:      Worried About Running Out of Food in the Last Year:      Ran Out of Food in the Last Year:    Transportation Needs:      Lack of Transportation (Medical):      Lack of Transportation (Non-Medical):    Physical Activity:      Days of Exercise per Week:      Minutes of Exercise per Session:    Stress:      Feeling of Stress :    Social Connections:      Frequency of Communication with Friends and Family:      Frequency of Social Gatherings with Friends and Family:      Attends Jewish Services:      Active Member of Clubs or Organizations:      Attends Club or Organization Meetings:      Marital Status:    Intimate Partner Violence:      Fear of Current or Ex-Partner:      Emotionally Abused:      Physically Abused:      Sexually Abused:        VITALS:  Patient Vitals for the past 24 hrs:   BP Temp Temp src Pulse Resp SpO2 Height Weight   07/17/21 1430 -- -- -- 88 -- 96 % -- --   07/17/21 1415 -- -- -- 89 -- 96 % -- --   07/17/21 1400 (!) 145/67 --  "-- 87 -- 96 % -- --   07/17/21 1345 -- -- -- 86 -- 97 % -- --   07/17/21 1330 -- -- -- 87 -- 99 % -- --   07/17/21 1316 -- -- -- 87 -- 98 % -- --   07/17/21 1300 138/71 -- -- 88 -- 97 % -- --   07/17/21 1245 -- -- -- 87 -- 96 % -- --   07/17/21 1230 -- -- -- 91 -- 97 % -- --   07/17/21 1215 139/56 -- -- 81 -- 97 % -- --   07/17/21 1200 (!) 141/65 -- -- 84 -- 95 % -- --   07/17/21 1145 (!) 150/68 -- -- 86 -- 96 % -- --   07/17/21 1130 (!) 146/66 -- -- 85 -- 95 % -- --   07/17/21 1100 -- -- -- 85 -- 98 % -- --   07/17/21 1045 (!) 151/72 -- -- 87 -- 97 % -- --   07/17/21 1030 -- -- -- 85 -- 98 % -- --   07/17/21 1015 -- -- -- 86 -- 97 % -- --   07/17/21 1000 -- -- -- 87 -- 97 % -- --   07/17/21 0957 (!) 159/79 99.3  F (37.4  C) Oral 90 20 98 % -- --   07/17/21 0945 -- -- -- 91 -- 98 % -- --   07/17/21 0930 (!) 159/79 -- -- -- -- -- -- --   07/17/21 0916 (!) 144/69 99.7  F (37.6  C) Temporal 98 18 -- 1.575 m (5' 2\") 43.1 kg (95 lb)       PHYSICAL EXAM    VITAL SIGNS: BP (!) 145/67   Pulse 88   Temp 99.3  F (37.4  C) (Oral)   Resp 20   Ht 1.575 m (5' 2\")   Wt 43.1 kg (95 lb)   SpO2 96%   BMI 17.38 kg/m      General Appearance: Well-appearing, well-nourished, no acute distress  Head:  Normocephalic, without obvious abnormality, atraumatic  Eyes:  PERRL, conjunctiva/corneas clear, EOM's intact,  ENT:  Lips, mucosa, and tongue normal, membranes are moist without pallor  Neck:  Normal ROM, symmetrical, trachea midline    Chest:  No tenderness or deformity, no crepitus  Cardio:  Regular rate and rhythm, no murmur, rub or gallop, 2+ pulses symmetric in all extremities  Pulm:  Clear to auscultation bilaterally, respirations unlabored,  Back:  ROM normal, no CVA tenderness, no spinal tenderness, no paraspinal tenderness  Abdomen:  Soft, non-tender, no rebound or guarding.  Musculoskeletal: Full ROM, no edema, no cyanosis, good ROM of major joints  Integument:  Warm, Dry, No erythema, No rash.    Neurologic:  Alert & " oriented.  No focal deficits appreciated.  Ambulatory.  Psychiatric:  Affect normal, Judgment normal, Mood normal.      LABS  Results for orders placed or performed during the hospital encounter of 07/17/21 (from the past 24 hour(s))   CBC with platelets + differential    Narrative    The following orders were created for panel order CBC with platelets + differential.  Procedure                               Abnormality         Status                     ---------                               -----------         ------                     CBC with platelets and d...[[id number]]  Abnormal            Final result               Manual Differential[[id number]]          Abnormal            Final result                 Please view results for these tests on the individual orders.   Basic metabolic panel   Result Value Ref Range    Sodium 142 136 - 145 mmol/L    Potassium 4.3 3.5 - 5.0 mmol/L    Chloride 110 (H) 98 - 107 mmol/L    Carbon Dioxide (CO2) 22 22 - 31 mmol/L    Anion Gap 10 5 - 18 mmol/L    Urea Nitrogen 16 8 - 28 mg/dL    Creatinine 0.83 0.60 - 1.10 mg/dL    Calcium 10.1 8.5 - 10.5 mg/dL    Glucose 92 70 - 125 mg/dL    GFR Estimate 65 >60 mL/min/1.73m2   Hepatic function panel   Result Value Ref Range    Bilirubin Total 1.0 0.0 - 1.0 mg/dL    Bilirubin Direct 0.3 <=0.5 mg/dL    Protein Total 7.1 6.0 - 8.0 g/dL    Albumin 4.1 3.5 - 5.0 g/dL    Alkaline Phosphatase 56 45 - 120 U/L    AST 16 0 - 40 U/L    ALT <9 0 - 45 U/L   Lipase   Result Value Ref Range    Lipase <9 0 - 52 U/L   Lactic acid whole blood   Result Value Ref Range    Lactic Acid 1.4 0.7 - 2.0 mmol/L   CBC with platelets and differential   Result Value Ref Range    WBC Count 12.0 (H) 4.0 - 11.0 10e3/uL    RBC Count 4.04 3.80 - 5.20 10e6/uL    Hemoglobin 11.8 11.7 - 15.7 g/dL    Hematocrit 37.3 35.0 - 47.0 %    MCV 92 78 - 100 fL    MCH 29.2 26.5 - 33.0 pg    MCHC 31.6 31.5 - 36.5 g/dL    RDW 13.4 10.0 - 15.0 %    Platelet Count 114 (L) 150 - 450  10e3/uL   Manual Differential   Result Value Ref Range    % Neutrophils 80 %    % Lymphocytes 14 %    % Monocytes 6 %    % Eosinophils 0 %    % Basophils 0 %    Absolute Neutrophils 9.6 (H) 1.6 - 8.3 10e3/uL    Absolute Lymphocytes 1.7 0.8 - 5.3 10e3/uL    Absolute Monocytes 0.7 0.0 - 1.3 10e3/uL    Absolute Eosinophils 0.0 0.0 - 0.7 10e3/uL    Absolute Basophils 0.0 0.0 - 0.2 10e3/uL    RBC Morphology Confirmed RBC Indices     Platelet Assessment  Automated Count Confirmed. Platelet morphology is normal.     Automated Count Confirmed. Platelet morphology is normal.   CRP inflammation   Result Value Ref Range    CRP 0.7 0.0-<0.8 mg/dL   UA with Microscopic reflex to Culture    Specimen: Urine, Clean Catch   Result Value Ref Range    Color Urine Yellow Colorless, Straw, Light Yellow, Yellow    Appearance Urine Cloudy (A) Clear    Glucose Urine Negative Negative mg/dL    Bilirubin Urine Negative Negative    Ketones Urine Negative Negative mg/dL    Specific Gravity Urine 1.010 1.001 - 1.030    Blood Urine 0.1 mg/dL (A) Negative    pH Urine 6.5 5.0 - 7.0    Protein Albumin Urine 70  (A) Negative mg/dL    Urobilinogen Urine <2.0 <2.0 mg/dL    Nitrite Urine Negative Negative    Leukocyte Esterase Urine 500 Patsy/uL (A) Negative    WBC Clumps Urine Present (A) None Seen /HPF    RBC Urine 10 (H) <=2 /HPF    WBC Urine >182 (H) <=5 /HPF    Narrative    Urine Culture ordered based on laboratory criteria   CT Abdomen Pelvis w/o Contrast    Narrative    EXAM: CT ABDOMEN PELVIS W/O CONTRAST  LOCATION: [facility]  DATE/TIME: 7/17/2021 11:04 AM    INDICATION: Abdominal pain, nausea, bloating and urinary urgency. History of right ureteral calculus..  COMPARISON: 6/9/2021  TECHNIQUE: CT scan of the abdomen and pelvis was performed without IV contrast. Multiplanar reformats were obtained. Dose reduction techniques were used.  CONTRAST: None.    FINDINGS:   LOWER CHEST: Moderate pericardial effusion unchanged. Right middle  lobe bronchiectasis with new peripheral atelectasis. New trace right pleural effusion.    HEPATOBILIARY: Negative noncontrast liver, gallbladder and biliary system.    PANCREAS: No significant mass, duct dilatation, or inflammatory change.    SPLEEN: Normal.    ADRENAL GLANDS: Normal.    KIDNEYS/BLADDER: New moderate right hydroureteronephrosis secondary to 4 mm right UVJ calculus, unchanged in position. Left renal cortical atrophy with stable 1.6 cm left upper pole cortical cyst and 1-2 mm papillary tip calcification mid left kidney.    BOWEL: Diverticulosis of the colon. No acute inflammatory change. No obstruction.     LYMPH NODES: No lymphadenopathy.    VASCULATURE: No aortoiliac aneurysm.    PELVIC ORGANS: Uterus is absent. No adnexal mass or abnormal fluid collection.    MUSCULOSKELETAL: Severe L1 mild L4 compression deformities are stable. Rightward scoliosis. Advanced degenerative arthropathy right hip with stable chronic deformity of right femoral head.      Impression    IMPRESSION:   1.  New moderate right hydroureteronephrosis secondary to 4 mm right UVJ calculus, unchanged in position.  2.  Chronic left renal cortical atrophy with 1 to 2 mm papillary tip calcification mid left kidney.  3.  Moderate pericardial effusion of uncertain etiology, unchanged.  4.  New right middle lobe subsegmental atelectasis and trace right pleural effusion.           RADIOLOGY  CT Abdomen Pelvis w/o Contrast   Final Result   IMPRESSION:    1.  New moderate right hydroureteronephrosis secondary to 4 mm right UVJ calculus, unchanged in position.   2.  Chronic left renal cortical atrophy with 1 to 2 mm papillary tip calcification mid left kidney.   3.  Moderate pericardial effusion of uncertain etiology, unchanged.   4.  New right middle lobe subsegmental atelectasis and trace right pleural effusion.              FINAL IMPRESSION:  1. Urinary tract infection without hematuria, site unspecified    2. Ureterolithiasis           ED COURSE & MEDICAL DECISION MAKIN:33 AM I met with the patient to gather history and to perform my initial exam. I discussed the plan for care while in the Emergency Department.  12:21 PM Re-assessed the patient and updated her on results.   12:32 PM I discussed the patient's case with Dr. Liu from [facility]. Will review patient's medical records and call back.   1:01 PM Spoke with Dr. Liu. Recommends discharging the patient on antibiotics and having her follow-up next week with [facility] for stone removal.     Pertinent Labs & Imaging studies reviewed. (See chart for details)  85 year old female presents to the Emergency Department for evaluation of symptoms that are concerning for urinary tract infection.  UA does show a UTI but CT scan also shows a kidney stone.  There is no nitrate positive or CRP, therefore after discussion with JOHN it was decided that she can be discharged home on oral antibiotics for outpatient follow-up for likely surgical removal of the stone as it has been there for over a month.  Patient was agreeable with this plan.  Return precautions discussed.    At the conclusion of the encounter I discussed the results of all of the tests and the disposition. The questions were answered. The patient or family acknowledged understanding and was agreeable with the care plan.        MEDICATIONS GIVEN IN THE EMERGENCY:  Medications   ciprofloxacin (CIPRO) infusion 400 mg (0 mg Intravenous Stopped 21 1438)       NEW PRESCRIPTIONS STARTED AT TODAY'S ER VISIT  Discharge Medication List as of 2021  2:39 PM      START taking these medications    Details   ciprofloxacin (CIPRO) 500 MG tablet Take 1 tablet (500 mg) by mouth 2 times daily for 7 days, Disp-14 tablet, R-0, Local Print             I, Rylee Yakymi, am serving as a scribe to document services personally performed by Dr. Maurilio Teran based on my observation and the provider's statements to me. I, Maurilio Teran, DO attest that Rylee  Jose is acting in a scribe capacity, has observed my performance of the services and has documented them in accordance with my direction.    Maurilio Teran D.O.  Emergency Medicine  [facility] EMERGENCY DEPARTMENT  [address] [place] [id number] [phone]  Dept: [phone]       Maurilio Teran,   07/17/21 1503

## 2021-07-19 ENCOUNTER — DOCUMENTATION ONLY (OUTPATIENT)
Dept: OTHER | Facility: CLINIC | Age: 86
End: 2021-07-19

## 2021-07-19 ENCOUNTER — VIRTUAL VISIT (OUTPATIENT)
Dept: UROLOGY | Facility: CLINIC | Age: 86
End: 2021-07-19
Payer: COMMERCIAL

## 2021-07-19 ENCOUNTER — TELEPHONE (OUTPATIENT)
Dept: UROLOGY | Facility: CLINIC | Age: 86
End: 2021-07-19

## 2021-07-19 DIAGNOSIS — N20.1 CALCULUS OF URETER: ICD-10-CM

## 2021-07-19 DIAGNOSIS — N13.2 HYDRONEPHROSIS WITH URINARY OBSTRUCTION DUE TO URETERAL CALCULUS: ICD-10-CM

## 2021-07-19 DIAGNOSIS — B96.20 E. COLI UTI: Primary | ICD-10-CM

## 2021-07-19 DIAGNOSIS — N39.0 E. COLI UTI: Primary | ICD-10-CM

## 2021-07-19 LAB — BACTERIA UR CULT: ABNORMAL

## 2021-07-19 PROCEDURE — 99443 PR PHYSICIAN TELEPHONE EVALUATION 21-30 MIN: CPT | Performed by: PHYSICIAN ASSISTANT

## 2021-07-19 RX ORDER — CALCITONIN SALMON 200 [IU]/.09ML
SPRAY, METERED NASAL
Status: ON HOLD | COMMUNITY
Start: 2020-11-15 | End: 2021-08-04

## 2021-07-19 RX ORDER — MIRTAZAPINE 15 MG/1
TABLET, FILM COATED ORAL
Status: ON HOLD | COMMUNITY
End: 2021-08-04

## 2021-07-19 RX ORDER — DULOXETIN HYDROCHLORIDE 20 MG/1
CAPSULE, DELAYED RELEASE ORAL
Status: ON HOLD | COMMUNITY
End: 2021-08-04

## 2021-07-19 RX ORDER — CIPROFLOXACIN 250 MG/1
TABLET, FILM COATED ORAL
COMMUNITY
Start: 2021-07-17 | End: 2021-07-19

## 2021-07-19 RX ORDER — SOLIFENACIN SUCCINATE 5 MG/1
TABLET, FILM COATED ORAL
COMMUNITY
End: 2021-08-07

## 2021-07-19 RX ORDER — CLOTRIMAZOLE 10 MG/1
LOZENGE ORAL
Status: ON HOLD | COMMUNITY
End: 2021-08-04

## 2021-07-19 RX ORDER — LORAZEPAM 0.5 MG/1
TABLET ORAL
Status: ON HOLD | COMMUNITY
Start: 2021-01-15 | End: 2021-08-04

## 2021-07-19 RX ORDER — SULFAMETHOXAZOLE/TRIMETHOPRIM 800-160 MG
1 TABLET ORAL 2 TIMES DAILY
Qty: 28 TABLET | Refills: 0 | Status: SHIPPED | OUTPATIENT
Start: 2021-07-19 | End: 2021-08-02

## 2021-07-19 RX ORDER — POTASSIUM CHLORIDE 1.5 G/1.58G
POWDER, FOR SOLUTION ORAL
Status: ON HOLD | COMMUNITY
End: 2021-08-04

## 2021-07-19 RX ORDER — OLANZAPINE 2.5 MG/1
TABLET, FILM COATED ORAL
Status: ON HOLD | COMMUNITY
End: 2021-08-04

## 2021-07-19 RX ORDER — RISPERIDONE 0.25 MG/1
TABLET ORAL
Status: ON HOLD | COMMUNITY
Start: 2021-02-12 | End: 2021-08-04

## 2021-07-19 RX ORDER — FAMOTIDINE 20 MG/1
TABLET, FILM COATED ORAL
Status: ON HOLD | COMMUNITY
Start: 2021-06-16 | End: 2021-08-04

## 2021-07-19 RX ORDER — FUROSEMIDE 20 MG
TABLET ORAL
Status: ON HOLD | COMMUNITY
End: 2021-08-04

## 2021-07-19 RX ORDER — OXYBUTYNIN CHLORIDE 5 MG/1
5 TABLET ORAL
Status: ON HOLD | COMMUNITY
Start: 2021-02-18 | End: 2021-08-04

## 2021-07-19 ASSESSMENT — PAIN SCALES - GENERAL: PAINLEVEL: NO PAIN (0)

## 2021-07-19 NOTE — PATIENT INSTRUCTIONS
Patient Stated Goal: Know what to expect after surgery  Ureteroscopy    Ureteroscopy is a procedure which is done for clearance of stones from the ureter, kidney or both. There are no incisions involved. The procedure involves your surgeon placing a small scope into your urethra. This is the opening where urine leaves your body.  The surgeon watches as they carefully guide the scope to the stone(s).  Modern flexible ureteroscopes can be used to reach virtually any location within the urinary tract.     The size, shape and location of the stone determines how best to treat the stone(s).  Whenever possible, stones are removed in one piece.  Larger stones need to be broken using a laser before removing in smaller pieces.  The goal is to remove all stones and stone fragments from that side of the body in a single treatment.  Complete stone clearance is an important step to prevent future kidney stone episodes.    Surgery:    Same day outpatient procedure    30-60 minutes    Procedure done in hospital surgical suite    General anesthesia (you will be asleep during the procedure)     Antibiotic prior to surgery to prevent infection    Physician will visit with you and respond to any questions or concerns and consent will be signed prior to going to the operating room    Risks:    Infection - Preoperative antibiotics should prevent new infections but it is possible that unanticipated bacteria may be introduced at time of surgery or that the stones were actually chronically infected before surgery      Injury - The ureter may be injured during this procedure.  This is most likely to happen if the ureter was very inflamed before surgery or if a stone is very tightly impacted.  The surgeon will not aggressively treat a stone if this creates a risk of injury.        Inaccessible Stones -A single procedure is effective in 95% of cases, but if your ureter is very narrow or your kidney stone is very impacted, a stent will be  placed and the procedure stopped.  In 1-2 weeks after the ureter has relaxed, the patient will be brought back to surgery and the procedure can be safely performed.      Incomplete stone clearance -Occasionally stone or stone fragments may not be completely cleared.  These may pass on their own, which may cause discomfort.  Our goal is to remove all possible stones and fragments.    Stent:      An internal soft tube will be placed between the kidney and the bladder while in surgery (after the stone is cleared). The stent will keep the kidney draining.    What should I expect?     It is common for a stent to cause some irritation and discomfort.   You may have:      The need to urinate suddenly     The need to urinate often     Pain during urination     A dull backache, which may get worse during urination     Blood stained urine (like fruit punch) and occasional small clots    It s important to remember the stent is necessary and only temporary. To feel more comfortable:      Drink more than you normally would but you do not have to constantly  flush your kidneys     Limiting your activity may decrease irritation or bleeding    Ibuprofen - 2 tablets every 6-8 hours     Use pain medications as directed.    When is the stent removed?    Most stents are removed within 5 days to 2 weeks after a procedure.     How is the stent removed?     Your stent will be removed in the [facility] with a small telescope and a grasping tool.  It usually takes less than 1 minute to remove the stent.    What should I expect after the stent is removed?     You should feel normal by the next day    Some patients find:    An increase in back pain about an hour after the stent is removed as the kidney fills up with urine before it starts to empty.  It can be as uncomfortable as your initial stone episode.  Taking pain medications before stent removal may be helpful, but you would need someone else to drive you to and from your  appointment.    Bladder symptoms usually disappear by the next morning.    Small amounts of blood in the urine may be seen occasionally for up to a week.    Diet:      After surgery, there are no dietary restrictions - Drink to thirst, there is no need to increase intake of fluids, as this may increase nausea symptoms. Try to eat smaller, more frequent snacks, instead of large meals.    Activity:    Many people return to work within 1-2 days. Fatigue is normal for a couple of weeks following surgery. With increased activity you may experience more discomfort and you may notice more blood in your urine.      Post-Operative Symptom Control    While you recover from your procedure, you can take steps to ease your recovery.    Medications that prevent further episodes of severe pain and help stones pass: Take these as prescribed on a regular basis even if you are NOT in pain      Ibuprofen (Advil or Motrin) - Is available over the counter Take 2 (200mg) tablets every 6 hours until the stone passes.  o prevents spasm of the ureter.    o Decreases pain      Dramamine - (drowsy version, non-generic formulation) Is available over the counter and decreases spasm of the ureter.  Take 50mg at bedtime every night until the stone passes. In addition, take every 6 hours as needed.  Dramamine:  o Decreases nausea  o Decreases acute pain  o Decreases recurrence of pain for next 24 hours  o Will help you sleep        *This medication will cause increased drowsiness, do not drive or operate machinery for 6 hours      Flomax- Studies show that Flomax decreases irritation from stents.   o Take every day with food until stone passes even if you do not have pain  o Flomax does not relieve pain.        *This medication may cause nasal congestion or light-headedness      Detrol ( Tolterodine) - After surgery Detrol may decrease stent irritation and pelvic pain  o Take as prescribed     *This medication may cause dry mouth, constipation or  blurry vision. Stop medication if unable to urinate.    Medication that are taken as needed to manage break through symptoms: Take these ONLY as required and hopefully not at all      Narcotics (Percocet, Vicodin, Dilaudid)- take as prescribed for severe pain unrelieved by ibuprofen and dramamine  o Take as prescribed for severe pain  o Narcotics have significant side effects and only  cover-up  pain. They have no effect on cause of pain.  o Common side effects:  - Confusion, disorientation and sedation - DO NOT DRIVE OR OPERATE MACHINERY WITHIN 24 HOURS  - Nausea - take Dramamine or Zofran  or Haldol to help control  - Constipation  - Sleep disturbances      Ondansetron (Zofran)-  o Take as prescribed  o Reserve for severe nausea  o May cause constipation, start over the counter Miralax if needed to treat this    Haldol-  o Take as prescribed  o Reserve for severe nausea    Warning Signs/Symptoms - Please call the [facility] 24 hours a day at [phone] IMMEDIATELY if you experience any of these:    Fever greater than 100.1     Chills    Pain NOT CONTROLLED by pain medications    Heavy bleeding or large clots in urine (small clots can be normal)    Persistent nausea and/or vomiting    Post-Operative Follow up:    The stone(s) will be sent from surgery to a lab for composition analysis.  These results are usually available before a one month post-operative visit.  If you had laser treatment to break up your stone, you will usually be scheduled for a low dose CT scan prior to your one month appointment.  This scan allows your surgeon to confirm that all stone fragments were cleared at time of surgery and that there have been no complications.  These results along with possible labs and urine studies will help us develop an individualized plan to prevent new stones from forming and keep existing stones from enlarging.  This visit is usually scheduled about 1 month after your original surgery.    The  [facility] can respond to your questions or concerns 24 hours a day at [phone].

## 2021-07-19 NOTE — PROGRESS NOTES
Assessment/Plan:    Assessment & Plan   Shirley was seen today for follow up.    Diagnoses and all orders for this visit:    E. coli UTI  -     sulfamethoxazole-trimethoprim (BACTRIM DS) 800-160 MG tablet; Take 1 tablet by mouth 2 times daily for 14 days  -     Patient Stated Goal: Know what to expect after surgery    Calculus of ureter  -     Patient Stated Goal: Know what to expect after surgery    Hydronephrosis with urinary obstruction due to ureteral calculus    Stone Management Plan  Stone Management 7/19/2021   Urinary Tract Infection Suspected Infection   Renal Colic Well controlled symptoms   Renal Failure No suspicion of renal failure   Current CT date 7/17/2021   Right sided stones? Yes   R Number of ureteral stones 1   R GSD of ureteral stones 5   R Location of ureteral stone Distal   R Number of kidney stones  No renal stones   R Hydronephrosis Moderate   R Stone Event New event   Diagnosis date 2/8/2021   Initial location of primary symptomatic stone Distal   Initial GSD of primary symptomatic stone 5   R Current Plan Clear   Clear rationale Associated treated infection   Left sided stones? Yes   L Number of kidney stones  1   L GSD of kidney stones < 2   L Hydronephrosis None   L Stone Event No current event   L Current Plan Observe   Observe rationale Limited stone burden with good prognosis for spontaneous passage     PLAN    86 yo F with hx of MDD, anxiety, aortic stenosis, chronic pain, and recurrent UTI's, initiated on quinolone. Long standing, non-progressing obstructing right distal ureteral stone.    Will change antibiotic to bactrim DS give culture s/s and allergy history.    Will proceed with ureterscopic stone clearance next week, while on cital antibiotic. Risks and benefits were detailed of ureteroscopic stone clearance including potential issues of urinary or systemic infection, ureteral injury, inaccessible stone, incomplete stone clearance, multiple surgeries, and stent related  symptoms of urgency, frequency and hematuria Patient verbalized understanding. Patient agrees with plan as discussed. Preoperative evaluation with primary care has been requested. Surgery can be performed under spinal anesthetic given significant cardiac valvular disorder.    Will change antibiotic to bactrim DS give culture s/s and allergy history.  Over the counter symptom control medications of ibuprofen, Dramamine and Tylenol were recommended.    Phone call duration: 39 minutes  44 minutes spent on the date of the encounter doing chart review, history and exam, documentation and further activities per the note    Cindi Meredith PA-C  [facility]    Subjective:     HPI  Ms. Shirley Phipps is a 85 year old  female who is being evaluated via a billable telephone visit by [facility] [place] following [facility] ER visit for urolithiasis.    She is a first time unidentified composition stone former who has not required stone clearance procedures. She has not previously participated in stone risk evaluation. She has no identified modifiable stone risk factors. She has no identified non-modifiable stone risk factors.    She was initially diagnosed and admitted for bilateral pyelonehritis with nobstructing right distal ureteral stone in February, discharged home with antibiotics. She was admitted in March for persistent ureteral stone with UTI. [place] [facility] consult at that time recommended ureteral stent which patient refused. In follow up with Urology, repeat imaging demonstrated non-progressing, obstructing ureteral stone. She required additional emergent visits in interval with most recent ER 2 days ago for fever. She noted associated symptoms of dysuria, urinary urgency, nausea, vomiting, diarrhea and abdominal pain. She mentioned history of a non-progressing kidney stone. Repeat labs and imaging were notable for CT noting persistent, obstructing right  ureteral stone. She was sent home with antibiotic for suspected UTI with plan for definitive stone clearance this week.    She states she is feeling better with her UTI symptoms. She recently had cataract surgery. She is asymptomatic at present. Pertinent negative current symptoms include:  fever, chills, right flank pain, nausea, vomiting, hematuria, urinary frequency and dysuria.     CT scan from 7/17/21 is personally reviewed and demonstrates a moderately obstructing 5 mm right UVJ stone, unchanged from 6/9/21, 3/23/21, and 2/8/21 comparison imaging. Chronic left renal atrophy with 1-2 mm mid pole papillary tip calcification. Moderate pericardial effusion, unchanged. New right middle lobe atelectasis.    Significant labs from presentation include mild hematuria, severe pyuria, negative nitrite, NA bacteria, > 100 K e. Coli (resistant to quinolones, ampicillin, and cefazolin) identified on urine culture, slightly elevated WBC, normal C reactive protein, normal creatinine and normal potassium.    ROS   A 12 point comprehensive review of systems is negative except for HPI    Past Medical History:   Diagnosis Date     Anemia      Aortic stenosis      Arthritis      Bicuspid aortic valve      Bladder infection      Cancer (H)     SQUAMOUS 2X     Closed burst fracture of lumbar vertebra (H) 03/26/2019    L4 BURST FX WITH RETROPULSION     Dyslipidemia      Fall 03/26/2019     GERD (gastroesophageal reflux disease)      History of transfusion     SHE DOESN'T THINK SHE HAD A TRANSFUSION REACTION     Hypertension      Past Surgical History:   Procedure Laterality Date     C RECONSTR TOTAL SHOULDER IMPLANT Left 12/26/2016    Procedure: LEFT REVERSE TOTAL SHOULDER ARTHROPLASTY;  Surgeon: Maurilio Marcelino MD;  Location: [facility];  Service: Orthopedics     OTHER SURGICAL HISTORY Right     surgery for prior wrist fracture     OTHER SURGICAL HISTORY Right     surgery for prior knee fracture     [facility]  12/25/2016           Current Outpatient Medications   Medication Sig Dispense Refill     calcitonin, salmon, (MIACALCIN) 200 UNIT/ACT nasal spray 1 USE 1 SPRAY ONCE DAILY IN ONE NOSTRIL ALTERNATING NOSTRILS DAILY       cholecalciferol (VITAMIN D3) 25 mcg (1000 units) capsule Take 1,000 Units by mouth       ciprofloxacin (CIPRO) 500 MG tablet Take 1 tablet (500 mg) by mouth 2 times daily for 7 days 14 tablet 0     clotrimazole (MYCELEX) 10 MG lozenge clotrimazole 10 mg jelani   DISSOLVE 1 TABLET (10 MG) IN THE MOUTH 5 TIMES DAILY FOR 14 DAYS.       cyclobenzaprine (FLEXERIL) 5 MG tablet [CYCLOBENZAPRINE (FLEXERIL) 5 MG TABLET] Take 1 tablet (5 mg total) by mouth at bedtime as needed for muscle spasms. 30 tablet 0     diclofenac (VOLTAREN) 1 % topical gel diclofenac 1 % topical gel       DULoxetine (CYMBALTA) 20 MG capsule duloxetine 20 mg capsule,delayed release   TAKE 1 CAPSULE BY MOUTH ONCE DAILY       famotidine (PEPCID) 20 MG tablet TAKE 1 TABLET BY MOUTH TWO TIMES A DAY. INDICATIONS  HEARTBURN       ferrous sulfate 325 (65 FE) MG tablet [FERROUS SULFATE 325 (65 FE) MG TABLET] Take 1 tablet (325 mg total) by mouth daily with breakfast. 90 tablet 0     furosemide (LASIX) 20 MG tablet furosemide 20 mg tablet   TOME ELAINA TABLETA TODOS LOS D AS       gabapentin (NEURONTIN) 100 MG capsule [GABAPENTIN (NEURONTIN) 100 MG CAPSULE] Take 200 mg by mouth daily as needed.       gabapentin (NEURONTIN) 100 MG capsule [GABAPENTIN (NEURONTIN) 100 MG CAPSULE] Take 200 mg by mouth 2 (two) times a day.        ibuprofen (ADVIL,MOTRIN) 200 MG tablet [IBUPROFEN (ADVIL,MOTRIN) 200 MG TABLET] Take 2 tablets (400 mg total) by mouth daily as needed for pain.  0     lidocaine 4 % patch [LIDOCAINE 4 % PATCH] Place 1 patch on the skin daily as needed. Remove and discard patch with 12 hours or as directed by MD.        LORazepam (ATIVAN) 0.5 MG tablet TAKE 1 TABLET BY MOUTH TWO TIMES DAILY AS NEEDED.       mecobalamin, vitamin B12, 1,000 mcg TbDL [MECOBALAMIN,  VITAMIN B12, 1,000 MCG TBDL] Place 1 tablet (1,000 mcg total) under the tongue daily. 90 tablet 0     mirtazapine (REMERON) 15 MG tablet mirtazapine 15 mg tablet   NILSA DELANEY GIOVANNY LONDONO LOS D AS AL ACOSTARSE       multivitamin with minerals (THERA-M) 9 mg iron-400 mcg Tab tablet [MULTIVITAMIN WITH MINERALS (THERA-M) 9 MG IRON-400 MCG TAB TABLET] Take 1 tablet by mouth daily.        OLANZapine (ZYPREXA) 2.5 MG tablet olanzapine 2.5 mg tablet   TAKE 1 TABLET (2.5 MG TOTAL) BY MOUTH 3 (THREE) TIMES A DAY.       oxybutynin (DITROPAN) 5 MG tablet Take 5 mg by mouth       potassium chloride (KLOR-CON) 20 MEQ packet potassium chloride 20 mEq oral packet   TAKE 20 MEQ BY MOUTH DAILY FOR 10 DAYS.       risperiDONE (RISPERDAL) 0.25 MG tablet TAKE 0.5 TABLETS (0.125 MG TOTAL) BY MOUTH 3 (THREE) TIMES A DAY.       senna-docusate (SENNOSIDES-DOCUSATE SODIUM) 8.6-50 mg tablet [SENNA-DOCUSATE (SENNOSIDES-DOCUSATE SODIUM) 8.6-50 MG TABLET] Take 1 tablet by mouth daily as needed for constipation.        solifenacin (VESICARE) 5 MG tablet solifenacin 5 mg tablet   TAKE 1 TABLET BY MOUTH EVERY DAY       tamsulosin (FLOMAX) 0.4 mg cap [TAMSULOSIN (FLOMAX) 0.4 MG CAP] Take 1 capsule (0.4 mg total) by mouth Daily after breakfast. 30 capsule 0     Allergies   Allergen Reactions     Cefdinir Hives     Clindamycin Nausea     C.Diff     Codeine Nausea and Vomiting     Tolerates oral morphine.     Flagyl [Metronidazole] Angioedema     Sulfa (Sulfonamide Antibiotics) [Sulfa Drugs] Nausea     Canker sores     Tramadol Unknown     Upset stomach     Social History     Socioeconomic History     Marital status:      Spouse name: Not on file     Number of children: Not on file     Years of education: Not on file     Highest education level: Not on file   Occupational History     Not on file   Tobacco Use     Smoking status: Never Smoker     Smokeless tobacco: Never Used   Substance and Sexual Activity     Alcohol use: Yes     Alcohol/week:  7.0 standard drinks     Comment: Alcoholic Drinks/day: 1 shot of bee daily     Drug use: No     Sexual activity: Not on file   Other Topics Concern     Not on file   Social History Narrative     Not on file     Social Determinants of Health     Financial Resource Strain:      Difficulty of Paying Living Expenses:    Food Insecurity:      Worried About Running Out of Food in the Last Year:      Ran Out of Food in the Last Year:    Transportation Needs:      Lack of Transportation (Medical):      Lack of Transportation (Non-Medical):    Physical Activity:      Days of Exercise per Week:      Minutes of Exercise per Session:    Stress:      Feeling of Stress :    Social Connections:      Frequency of Communication with Friends and Family:      Frequency of Social Gatherings with Friends and Family:      Attends [employer]oration[employer] Services:      Active Member of Clubs or Organizations:      Attends Club or Organization Meetings:      Marital Status:    Intimate Partner Violence:      Fear of Current or Ex-Partner:      Emotionally Abused:      Physically Abused:      Sexually Abused:      Family History   Problem Relation Age of Onset     Diabetes Type 2  Other      Breast Cancer Mother      Diabetes Father      Diabetes Brother      Objective:     No vitals or physical exam obtained due to virtual visit    Labs    Urinalysis POC (Office):  Nitrite Urine   Date Value Ref Range Status   07/17/2021 Negative Negative Final   02/08/2021 Negative Negative Final   10/20/2020 Negative Negative Final       Lab Urinalysis:  Nitrite Urine   Date Value Ref Range Status   07/17/2021 Negative Negative Final   02/08/2021 Negative Negative Final   10/20/2020 Negative Negative Final    and Acute Labs   CBC   WBC   Date Value Ref Range Status   03/24/2021 7.0 4.0 - 11.0 thou/uL Final   03/23/2021 10.6 4.0 - 11.0 thou/uL Final   02/12/2021 7.4 4.0 - 11.0 thou/uL Final     WBC Count   Date Value Ref Range Status   07/17/2021 12.0 (H) 4.0 - 11.0  10e3/uL Final     Hemoglobin   Date Value Ref Range Status   07/17/2021 11.8 11.7 - 15.7 g/dL Final   03/24/2021 9.2 (L) 12.0 - 16.0 g/dL Final   03/23/2021 10.5 (L) 12.0 - 16.0 g/dL Final     Platelet Count   Date Value Ref Range Status   07/17/2021 114 (L) 150 - 450 10e3/uL Final   03/24/2021 91 (L) 140 - 440 thou/uL Final   03/23/2021 105 (L) 140 - 440 thou/uL Final     Comment:     This value was suppressed on a previous preliminary result.   , C Reactive Protein    CRP   Date Value Ref Range Status   07/17/2021 0.7 0.0-<0.8 mg/dL Final   03/23/2021 0.4 0.0 - 0.8 mg/dL Final   , Renal Panel  KSINo results found for: CREATININE and Urine Culture    Culture   Date Value Ref Range Status   07/17/2021 >100,000 CFU/mL Escherichia coli (A)  Final   03/23/2021 No Growth  Final   02/08/2021 No Growth  Final

## 2021-07-19 NOTE — PROGRESS NOTES
Patient states that they are in [place] at the time of their visit.  Patient is aware telehealth visit is billable and agrees to proceed.  Elena Almaguer RN

## 2021-07-19 NOTE — TELEPHONE ENCOUNTER
Message left for patient to call clinic to set up an appointment for kidney stone follow-up for today or tomorrow.  Elena Almaguer RN

## 2021-07-20 ENCOUNTER — PREP FOR PROCEDURE (OUTPATIENT)
Dept: UROLOGY | Facility: CLINIC | Age: 86
End: 2021-07-20

## 2021-07-20 ENCOUNTER — TELEPHONE (OUTPATIENT)
Dept: UROLOGY | Facility: CLINIC | Age: 86
End: 2021-07-20

## 2021-07-20 DIAGNOSIS — B96.20 E. COLI UTI: ICD-10-CM

## 2021-07-20 DIAGNOSIS — N39.0 E. COLI UTI: ICD-10-CM

## 2021-07-20 DIAGNOSIS — N20.1 CALCULUS OF URETER: Primary | ICD-10-CM

## 2021-08-01 ENCOUNTER — HOSPITAL ENCOUNTER (EMERGENCY)
Facility: HOSPITAL | Age: 86
Discharge: HOME | End: 2021-08-01
Attending: EMERGENCY MEDICINE | Admitting: EMERGENCY MEDICINE
Payer: COMMERCIAL

## 2021-08-01 ENCOUNTER — APPOINTMENT (OUTPATIENT)
Dept: CT IMAGING | Facility: HOSPITAL | Age: 86
End: 2021-08-01
Attending: EMERGENCY MEDICINE
Payer: COMMERCIAL

## 2021-08-01 ENCOUNTER — APPOINTMENT (OUTPATIENT)
Dept: RADIOLOGY | Facility: HOSPITAL | Age: 86
End: 2021-08-01
Attending: EMERGENCY MEDICINE
Payer: COMMERCIAL

## 2021-08-01 ENCOUNTER — LAB (OUTPATIENT)
Dept: FAMILY MEDICINE | Facility: CLINIC | Age: 86
End: 2021-08-01
Attending: PHYSICIAN ASSISTANT
Payer: COMMERCIAL

## 2021-08-01 VITALS
TEMPERATURE: 98.1 F | BODY MASS INDEX: 16.28 KG/M2 | HEART RATE: 69 BPM | OXYGEN SATURATION: 99 % | DIASTOLIC BLOOD PRESSURE: 58 MMHG | WEIGHT: 89 LBS | SYSTOLIC BLOOD PRESSURE: 125 MMHG | RESPIRATION RATE: 18 BRPM

## 2021-08-01 DIAGNOSIS — N20.1 CALCULUS OF URETER: ICD-10-CM

## 2021-08-01 DIAGNOSIS — S32.000A LUMBAR COMPRESSION FRACTURE, CLOSED, INITIAL ENCOUNTER (H): ICD-10-CM

## 2021-08-01 LAB — SARS-COV-2 RNA RESP QL NAA+PROBE: NEGATIVE

## 2021-08-01 PROCEDURE — U0003 INFECTIOUS AGENT DETECTION BY NUCLEIC ACID (DNA OR RNA); SEVERE ACUTE RESPIRATORY SYNDROME CORONAVIRUS 2 (SARS-COV-2) (CORONAVIRUS DISEASE [COVID-19]), AMPLIFIED PROBE TECHNIQUE, MAKING USE OF HIGH THROUGHPUT TECHNOLOGIES AS DESCRIBED BY CMS-2020-01-R: HCPCS

## 2021-08-01 PROCEDURE — 72131 CT LUMBAR SPINE W/O DYE: CPT

## 2021-08-01 PROCEDURE — 73560 X-RAY EXAM OF KNEE 1 OR 2: CPT | Mod: RT

## 2021-08-01 PROCEDURE — 99285 EMERGENCY DEPT VISIT HI MDM: CPT | Mod: 25

## 2021-08-01 PROCEDURE — U0005 INFEC AGEN DETEC AMPLI PROBE: HCPCS

## 2021-08-01 PROCEDURE — 72100 X-RAY EXAM L-S SPINE 2/3 VWS: CPT

## 2021-08-01 PROCEDURE — 250N000013 HC RX MED GY IP 250 OP 250 PS 637: Performed by: EMERGENCY MEDICINE

## 2021-08-01 PROCEDURE — 73502 X-RAY EXAM HIP UNI 2-3 VIEWS: CPT

## 2021-08-01 PROCEDURE — 99285 EMERGENCY DEPT VISIT HI MDM: CPT

## 2021-08-01 RX ORDER — MORPHINE SULFATE 15 MG/1
7.5 TABLET ORAL ONCE
Status: COMPLETED | OUTPATIENT
Start: 2021-08-01 | End: 2021-08-01

## 2021-08-01 RX ORDER — IBUPROFEN 600 MG/1
600 TABLET, FILM COATED ORAL ONCE
Status: COMPLETED | OUTPATIENT
Start: 2021-08-01 | End: 2021-08-01

## 2021-08-01 RX ORDER — ACETAMINOPHEN 325 MG/1
975 TABLET ORAL ONCE
Status: COMPLETED | OUTPATIENT
Start: 2021-08-01 | End: 2021-08-01

## 2021-08-01 RX ORDER — MORPHINE SULFATE 15 MG/1
TABLET ORAL
Qty: 12 TABLET | Refills: 0 | Status: SHIPPED | OUTPATIENT
Start: 2021-08-01 | End: 2021-08-16

## 2021-08-01 RX ADMIN — ACETAMINOPHEN 975 MG: 325 TABLET ORAL at 14:09

## 2021-08-01 RX ADMIN — MORPHINE SULFATE 7.5 MG: 15 TABLET ORAL at 14:09

## 2021-08-01 RX ADMIN — IBUPROFEN 600 MG: 600 TABLET, FILM COATED ORAL at 14:08

## 2021-08-01 RX ADMIN — MORPHINE SULFATE 7.5 MG: 15 TABLET ORAL at 19:07

## 2021-08-01 NOTE — ED PROVIDER NOTES
EMERGENCY DEPARTMENT ENCOUNTER       ED Course & Medical Decision Making       Final Impression  85-year-old female here with 2 reportedly separate complaints of right knee pain and diffuse low back pain.  She relates the knee pain to a twisting injury almost a week ago in which she was ambulating with her walker that got stuck on a concrete surface.  Clinically relatively low concern for fracture or dislocation but will perform x-ray for further evaluation given advanced age and osteoporosis.  Additionally with some diffuse low back pain that she indicates is producible, with no focal lumbar tenderness.  However given high risk for osteoporosis and potential pathologic fractures including potential underlying malignancy with recent unintentional weight loss will perform CT of the L-spine along with imaging of the right hip and pelvis.  May have some component of radicular pain.  No red flags to suggest cauda equina syndrome or spinal epidural abscess.  This seems unlikely to represent pain related to a known obstructing kidney stone for which she has upcoming intervention in a few days.  He also of low concern for vascular catastrophe such as AAA or dissection at this time.  Plan for multimodal pain therapy with Tylenol, ibuprofen, and a small dose of oral morphine. Pt already has lidocaine patches.  If no acute abnormalities anticipate likely discharge home to follow-up with PCP and or orthopedics.  Discussed need for outpatient follow-up with PCP to further evaluate unintentional weight loss, and patient understands that this could represent a more severe condition including cancer.    1:18 PM I met with the patient, obtained history, performed an initial exam, and discussed options and plan for diagnostics and treatment here in the ED.  4:30 PM L spine CT w/ L2 compression fx w/ 4mm retropulsion. Will d/w nsgy. XR R hip & pelvis w/ advanced OA. Knee XR NAD - pain there may be in part radicular vs unrelated MSK  sudha. Pt updated, did feel some improvement w/ PO morphine 7.5 mg. On repeat exam has full strength w/ dorsiflexion/plantar flexion, flex/ext at knee and hip flexion w/ encouragement. SILT.   4:41 PM I spoke to WELLINGTON Arce, on Dr. Marc's team, from neurosurgery who recommends upright XR L spine w/ existingTLSO they have fitted pt for in past - she will review XRs prior to discharge.   4:53 PM I updated patient on plan. Originally wanted to leave AMA. I talked to son who is willing to go  her TLSO brace from home for upright lumbar plane films that neurosurgery will review prior to discharge. Pt now willing to stay.  6:28 PM I spoke to WELLINGTON Arce, on Dr. Marc's team, from neurosurgery. She recommends discharge home w/ TLSO brace when upright or out of bed. They will have office call her tomorrow to set up close follow up.   6:43 PM We discussed the plan for discharge and the patient is agreeable. Reviewed supportive cares, symptomatic treatment, outpatient follow up, and reasons to return to the Emergency Department. Patient to be discharged by ED RN.     Prior to making a final disposition on this patient the results of patient's tests and other diagnostic studies were discussed with the patient. All questions were answered. Patient expressed understanding of the plan and was amenable to it.    Medications   acetaminophen (TYLENOL) tablet 975 mg (975 mg Oral Given 8/1/21 1409)   morphine (MSIR) IR half-tab 7.5 mg (7.5 mg Oral Given 8/1/21 1409)   ibuprofen (ADVIL/MOTRIN) tablet 600 mg (600 mg Oral Given 8/1/21 1408)   morphine (MSIR) IR half-tab 7.5 mg (7.5 mg Oral Given 8/1/21 1907)       Final Impression     1. Lumbar compression fracture, closed, initial encounter (H)          Chief Complaint     Chief Complaint   Patient presents with     Knee Pain     Back Pain       Knee Pain and Back Pain      HPI     Shirley Phipps is a 85 year old female with a history of chronic pain of right knee, chronic pain  syndrome, closed burst fracture of lumbar vertebra, subdural hematoma, HTN, GERD, and osteoporosis, who presents to the ED via walk-in for evaluation of knee pain and back pain.    Patient reports that six days ago, she went to patio and went to switch to her outside walker and twisted right knee while walking due to tennis balls on walker being wet and getting stuck on the concrete. She states that right knee pain is located over the front diffusely and has not been improving. She reports that pain is worse with walking. Patient denies any numbness and tingling in feet or legs. Patient reports right hip pain and some R thigh pain, however feels knee pain is different. Patient also reports diffuse back pain x6 days after she went to put groceries away in recent days and bent down to catch a shelf on the freezer that was falling. Patient has taken 3 ibuprofen today around five hours ago, which provided some relief.  Patient notes a hx of osteoporosis with prior spine fracture due to fall remotely with no surgery required at that time.    Patient reports unintentional weight loss over about a week, five pounds. Patient denies dysuria, hematuria, changes in continence, abdominal pain, chest pain, vomiting,diarrhea, and fever. Patient is not on blood thinners. Patient denies a history of cancer. Patient lives alone and ambulates with walker.     I, Xuan Daniels am serving as a scribe to document services personally performed by Sindy Murphy MD, based on my observation and the provider's statements to me. I, Sindy Murphy MD attest that Xuan Daniels is acting in a scribe capacity, has observed my performance of the services and has documented them in accordance with my direction.    Past Medical History     Patient Active Problem List    Diagnosis Date Noted     Calculus of ureter 07/20/2021     Priority: Medium     Added automatically from request for surgery [id number]       E. coli UTI 07/20/2021     Priority: Medium     Added  automatically from request for surgery [id number]         Past Medical History:   Diagnosis Date     Anemia      Aortic stenosis      Arthritis      Bicuspid aortic valve      Bladder infection      Cancer (H)      Closed burst fracture of lumbar vertebra (H) 03/26/2019     Dyslipidemia      Fall 03/26/2019     GERD (gastroesophageal reflux disease)      History of transfusion      Hypertension      Past Surgical History:   Procedure Laterality Date     C RECONSTR TOTAL SHOULDER IMPLANT Left 12/26/2016    Procedure: LEFT REVERSE TOTAL SHOULDER ARTHROPLASTY;  Surgeon: Maurilio Marcelino MD;  Location: [facility];  Service: Orthopedics     OTHER SURGICAL HISTORY Right     surgery for prior wrist fracture     OTHER SURGICAL HISTORY Right     surgery for prior knee fracture     [facility]  12/25/2016          Family History   Problem Relation Age of Onset     Diabetes Type 2  Other      Breast Cancer Mother      Diabetes Father      Diabetes Brother       Social History     Tobacco Use     Smoking status: Never Smoker     Smokeless tobacco: Never Used   Substance Use Topics     Alcohol use: Yes     Alcohol/week: 7.0 standard drinks     Comment: Alcoholic Drinks/day: 1 shot of bee daily     Drug use: No       Relevant past medical, surgical, family and social history as documented above, has been reviewed and discussed with patient. No changes or additions, unless otherwise noted in the HPI.    Current Medications     Discharge Medication List as of 8/1/2021  6:40 PM      START taking these medications    Details   morphine (MSIR) 15 MG IR tablet Take 0.5-1 tablet every 4-6 hours as needed for breakthrough pain, Disp-12 tablet, R-0, Local Print         CONTINUE these medications which have NOT CHANGED    Details   calcitonin, salmon, (MIACALCIN) 200 UNIT/ACT nasal spray 1 USE 1 SPRAY ONCE DAILY IN ONE NOSTRIL ALTERNATING NOSTRILS DAILY, Historical      cholecalciferol (VITAMIN D3) 25 mcg (1000 units) capsule Take 1,000  Units by mouth, Historical      clotrimazole (MYCELEX) 10 MG lozenge clotrimazole 10 mg jelani   DISSOLVE 1 TABLET (10 MG) IN THE MOUTH 5 TIMES DAILY FOR 14 DAYS., Historical      cyclobenzaprine (FLEXERIL) 5 MG tablet [CYCLOBENZAPRINE (FLEXERIL) 5 MG TABLET] Take 1 tablet (5 mg total) by mouth at bedtime as needed for muscle spasms., Disp-30 tablet, R-0, Normal      diclofenac (VOLTAREN) 1 % topical gel diclofenac 1 % topical gel, Historical      DULoxetine (CYMBALTA) 20 MG capsule duloxetine 20 mg capsule,delayed release   TAKE 1 CAPSULE BY MOUTH ONCE DAILY, Historical      famotidine (PEPCID) 20 MG tablet TAKE 1 TABLET BY MOUTH TWO TIMES A DAY. INDICATIONS  HEARTBURN, Historical      ferrous sulfate 325 (65 FE) MG tablet [FERROUS SULFATE 325 (65 FE) MG TABLET] Take 1 tablet (325 mg total) by mouth daily with breakfast., Disp-90 tablet, R-0, Normal      furosemide (LASIX) 20 MG tablet furosemide 20 mg tablet   TOME ELAINA TABLETA TODOS LOS D AS, Historical      !! gabapentin (NEURONTIN) 100 MG capsule [GABAPENTIN (NEURONTIN) 100 MG CAPSULE] Take 200 mg by mouth daily as needed., Historical      !! gabapentin (NEURONTIN) 100 MG capsule [GABAPENTIN (NEURONTIN) 100 MG CAPSULE] Take 200 mg by mouth 2 (two) times a day. , Historical      ibuprofen (ADVIL,MOTRIN) 200 MG tablet [IBUPROFEN (ADVIL,MOTRIN) 200 MG TABLET] Take 2 tablets (400 mg total) by mouth daily as needed for pain., R-0, OTC      lidocaine 4 % patch [LIDOCAINE 4 % PATCH] Place 1 patch on the skin daily as needed. Remove and discard patch with 12 hours or as directed by MD. Historical      LORazepam (ATIVAN) 0.5 MG tablet TAKE 1 TABLET BY MOUTH TWO TIMES DAILY AS NEEDED., Historical      mecobalamin, vitamin B12, 1,000 mcg TbDL [MECOBALAMIN, VITAMIN B12, 1,000 MCG TBDL] Place 1 tablet (1,000 mcg total) under the tongue daily., Disp-90 tablet, R-0, Normal      mirtazapine (REMERON) 15 MG tablet mirtazapine 15 mg tablet   TOME ELAINA TABLETA TODOS LOS D AS AL  ACOSTARSE, Historical      multivitamin with minerals (THERA-M) 9 mg iron-400 mcg Tab tablet [MULTIVITAMIN WITH MINERALS (THERA-M) 9 MG IRON-400 MCG TAB TABLET] Take 1 tablet by mouth daily. , Historical      OLANZapine (ZYPREXA) 2.5 MG tablet olanzapine 2.5 mg tablet   TAKE 1 TABLET (2.5 MG TOTAL) BY MOUTH 3 (THREE) TIMES A DAY., Historical      oxybutynin (DITROPAN) 5 MG tablet Take 5 mg by mouth, Historical      potassium chloride (KLOR-CON) 20 MEQ packet potassium chloride 20 mEq oral packet   TAKE 20 MEQ BY MOUTH DAILY FOR 10 DAYS., Historical      risperiDONE (RISPERDAL) 0.25 MG tablet TAKE 0.5 TABLETS (0.125 MG TOTAL) BY MOUTH 3 (THREE) TIMES A DAY., Historical      senna-docusate (SENNOSIDES-DOCUSATE SODIUM) 8.6-50 mg tablet [SENNA-DOCUSATE (SENNOSIDES-DOCUSATE SODIUM) 8.6-50 MG TABLET] Take 1 tablet by mouth daily as needed for constipation. , Historical      solifenacin (VESICARE) 5 MG tablet solifenacin 5 mg tablet   TAKE 1 TABLET BY MOUTH EVERY DAY, Historical      sulfamethoxazole-trimethoprim (BACTRIM DS) 800-160 MG tablet Take 1 tablet by mouth 2 times daily for 14 days, Disp-28 tablet, R-0, E-Prescribe      tamsulosin (FLOMAX) 0.4 mg cap [TAMSULOSIN (FLOMAX) 0.4 MG CAP] Take 1 capsule (0.4 mg total) by mouth Daily after breakfast., Disp-30 capsule, R-0, Normal       !! - Potential duplicate medications found. Please discuss with provider.          Allergies     Allergies   Allergen Reactions     Cefdinir Hives     Clindamycin Nausea     C.Diff     Codeine Nausea and Vomiting     Tolerates oral morphine.     Flagyl [Metronidazole] Angioedema     Sulfa (Sulfonamide Antibiotics) [Sulfa Drugs] Nausea     Canker sores     Tramadol Unknown     Upset stomach       Review of Systems     Constitutional: Denies fever, Positive for unintentional weight loss (5 lbs).  Eyes: Denies discharge  HENT: Denies sore throat  Respiratory: Denies cough    Cardiovascular: Denies chest pain  GI: Denies vomiting, abdominal  pain, diarrhea, Positive for bloating.   : Denies dysuria, hematuria, new blood in stool  Musculoskeletal: Denies myalgias, Positive for right knee and mid back pain.   Skin: Denies rashes  Neurologic: Denies headache, numbness or tingling in feet or legs.  Immunologic: Denies immunosuppression    Remainder of systems reviewed, unless noted in HPI all others negative.    Physical Exam     /58   Pulse 69   Temp 98.1  F (36.7  C) (Tympanic)   Resp 18   Wt 40.4 kg (89 lb)   SpO2 99%   BMI 16.28 kg/m    Constitutional: Somewhat thin/frail elderly woman. Awake, alert, appears mildly uncomfortable but not toxic.  Head: Normocephalic, atraumatic.  ENT: Mucous membranes moist.   Eyes: Conjunctiva normal  Respiratory: Respirations even, unlabored. Lungs clear to ascultation bilaterally.  Cardiovascular: Regular rate and rhythm. No pitting edema.   GI: Abdomen soft, non-tender. No guarding or rebound. No masses.  : No CVA tenderness  Musculoskeletal: No deformity. Mild diffuse lumbar tenderness without focal bony tenderness.  No step-offs or crepitus.  No significant SI joint tenderness. Right knee mild diffuse tenderness anteriorly without focal bony tenderness.  Negative anterior drawer and posterior drawer.  No varus or valgus instability.  No step-offs or crepitus.  Negative straight leg raise.  Neurovascularly intact distally. 2+ symmetric distal pulses.  Integument: Warm, dry. No rash.   Neurologic: Alert & oriented x 3. Normal speech. Grossly normal motor and sensory function. No focal deficits noted.  Psychiatric: Normal mood and affect. Normal judgment.       Labs   Labs Ordered and Resulted from Time of ED Arrival Up to the Time of Departure from the ED - No data to display      Radiology     I have ordered and reviewed the following imaging exams. Upon independent review of the images and radiology reads, I agree with the reads documented below.    Lumbar spine XR, 2-3 views   Final Result    IMPRESSION: Diffuse bone demineralization. Redemonstration of mild acute compression fracture deformity of the inferior endplate of the L2 vertebral body. Stable appearance of severe compression fracture deformities of the T11 and L1 vertebral bodies and    mild compression fracture deformity of the inferior endplate of the L4 vertebral body.      There is approximately 20 degrees dextrocurvature of the lower thoracic and lumbar spine, apex at L2-L3, from the superior T10 endplate to the inferior L4 endplate. Lateral alignment appears grossly maintained. Moderate degenerative lumbar spondylosis.    Mild degenerative change in the sacroiliac joints. Soft tissues are grossly unremarkable.      Lumbar spine CT w/o contrast   Final Result   IMPRESSION:   1.  Acute osteoporotic fracture involving the inferior endplate of L2 with mild approximately 20-30% height loss and 0.4 cm retropulsion of the posteroinferior vertebral body margin.   2.  Chronic L1 and L4 osteoporotic fractures.   3.  Lumbar spondylosis at L4-L5 with moderate spinal canal stenosis and moderate bilateral foraminal stenosis.         XR Pelvis and Hip Right 2 Views   Final Result   IMPRESSION: No acute fracture or malalignment. Severe right hip degenerative changes with bone-on-bone articulation. There is bony remodeling with flattening of the femoral head and mild superolateral subluxation. Otherwise mild degenerative changes of    the bony pelvis. Moderate to severe degenerative changes of the lower lumbar spine. Osteopenia.      XR Knee Right 1/2 Views   Final Result   IMPRESSION: No acute fracture or malalignment. Status post plate and screw fixation of a remote healed proximal tibial fracture. No hardware complication. Moderate lateral and mild patellofemoral compartment degenerative changes. No knee joint effusion.    Osteopenia.            Sindy Murphy MD  08/01/21 [id number]

## 2021-08-01 NOTE — ED TRIAGE NOTES
Patient arrives by private car for evaluation of right knee pain and back pain since Monday.  Patient reports that she twisted her knee and hurt her back when putting things into her freezer.

## 2021-08-01 NOTE — PLAN OF CARE
Neurosurgery plan of care:      Plan:  1. Have family bring custom TLSO to ED  2. Will need to wear TLSO whenever HOB >30 or OOB  3. Obtain upright lumbar XR in brace in ED.   4. Will review XR and if alignment looks ok, pt can discharge from ED and see NP/PA in the outpt neurosurgery clinic this week on a Dr Marc clinic day. We will call pt tomorrow to schedule.    ADDENDUM  Upright lumbar XR in brace reviewed. L2 fracture appears stable in brace when upright. OK to discharge pt. Wear TLSO when HOB >30 or OOB. Plan to call pt tomorrow to arrange follow up appt in clinic this wk.    Dr Acosta is weekend covering neurosurgeon    HPI:  Spoke with Dr Murphy about Shirley, an 84yo F hx osteoporosis on no anticoagulation known to our practice (Dr Marc patient) prev treated with custom TLSO for burst fractures of T11, L1, L4 and lost to follow up who presented to [facility] ED today with complaints of new low back pain and R knee pain after a twisting injury when she was looking in the fridge a week ago. Denies leg numbness, weakness, bowel or bladder changes or saddle anesthesia. Exam reported as neuro intact. CT lumbar spine wo contrast demonstrates inferior endplate compression fracture of L2 with 20-30% ht loss and 4mm retropulsion of the posterior inferior aspect. ED does not plan to admit pt for any reason at this time.      Yazmin Neal FNP-C  [facility] Neurosurgery  O. [phone]

## 2021-08-01 NOTE — DISCHARGE INSTRUCTIONS
Your CT scan today showed a new spinal compression fracture that may be putting slight pressure on your nerves causing the pain that you are feeling.  Wear your old TLSO brace when upright or out of bed. Dr. Marc's neurosurgery team will call you likely tomorrow to set up follow-up for later this week, Tuesday at the earliest.      For pain take acetaminophen (Tylenol) 650 mg every 4-6 hours (maximum 4000 mg per day). If needed you can also take ibuprofen 400 mg every 6-8 hours. Take the ibuprofen with food to prevent stomach irritation.  We have prescribed a stronger opiate pain medication called oral morphine. This medication can cause drowsiness, so do not operate heavy machinery or perform certain tasks while taking it.  The medication also commonly causes constipation, so we recommend that you obtain a stool softener over-the-counter to take with it.  This medication can also be habit-forming (addicting), so please take the minimum amount possible for minimum possible duration.      Come back to the ER in the meantime if you have worsening symptoms including severe uncontrolled pain, changes in strength or sensation especially numbness in the groin where you would wipe yourself after going to the bathroom, changes in bowel or bladder continence, or other symptoms that worry you.  Thanks and we hope you feel better soon.    Additional discharge instructions:    *No lifting, pushing or pulling greater than 5-10 pound (this is about a gallon of milk).   *No repetitive bending, twisting, or jarring activities   *No overhead work   *No aerobic or strenuous activity   *No activities with increased risk of falls   *You may move about your home as tolerated   *You may walk up and down stairs as tolerated       WALKING PROGRAM: As you can tolerate, walk daily-start with 5-10 minutes of continuous walking. This is in addition to the walking that you do as part of your daily activities. Increase the time that you walk by  5 minutes every couple of days. Do not exceed 30-45 minutes of continuous walking until seen in follow-up.   **Listen to your body, if you find that you are more painful or fatigued, you may need to proceed more slowly.     **Do not smoke or expose yourself to second hand smoke. Cigarette smoke can delay healing and cause complications.     DRIVING:  We recommend that you do not drive while taking medications for pain or muscle spasms. Always read and follow the advice on your prescription bottle. If you have questions, speak with your pharmacist.   We recommend that you do not drive while wearing a brace, as it could limit your range of motion.       WEARING THE LUMBAR BRACE:     * Wear the brace when out of bed or sitting upright in bed.   * You should apply the brace before standing.   * You may shower seated without brace.   Sit down on shower chair, remove brace   Shower   Dry   Re-apply brace before standing.   Take care not to fall   *If your brace is not fitting call [facility] [phone]   *Check your skin under your brace at least daily.     Call [phone] with the following symptoms:     *Worsening pain not relieved by the pain prescription given   *Worsening or new onset of weakness, or numbness and tingling   *Loss or change in your ability to control bowel or bladder function   *Change in your ability to walk, talk, see or think   *If you did not have a bowel movement before leaving the hospital and your bowels have not moved within 48 hours of your discharge     !!!! IF YOU HAVE A SERIOUS OR THREATENING EMERGENCY, CALL 911 OR COME TO THE EMERGENCY ROOM.     QUESTIONS OR CONCERNS:   OUR OFFICE HOURS ARE FROM 9:00 A.M -4:30 P.M. MONDAY-FRIDAY.  AFTER OFFICE HOURS, CALLS ARE ANSWERED BY THE ON-CALL PROVIDER. PLEASE CALL WITH ROUTINE QUESTIONS DURING OFFICE HOURS. THE ON-CALL PROVIDER WILL NOT REFILL PRESCRIPTIONS.

## 2021-08-01 NOTE — ED NOTES
Pt was trying to get in and out thru her patio door and her walker got hung up causing her to twist her knee. Pt also c/o back pain after trying to move things around in and out of her freezer. Pt states that all this happened last Monday.

## 2021-08-02 ENCOUNTER — TELEPHONE (OUTPATIENT)
Dept: UROLOGY | Facility: CLINIC | Age: 86
End: 2021-08-02

## 2021-08-02 ENCOUNTER — TELEPHONE (OUTPATIENT)
Dept: NEUROSURGERY | Facility: CLINIC | Age: 86
End: 2021-08-02

## 2021-08-02 NOTE — TELEPHONE ENCOUNTER
----- Message from CHERYL German CNP sent at 8/1/2021  9:35 PM CDT -----  Mohan Pichardo!  This patient from the weekend needs a follow up appt with NP/PA in clinic this week on a  Ruff day for a new L2 fracture, shes in a brace. No new imaging needed. Shes a known patient of ours. Thx!

## 2021-08-02 NOTE — TELEPHONE ENCOUNTER
Called Shirley to see how she is doing. She denied nausea, reported a mild pain in her back. She voted to call  our office to get her follow up appt set up after her kidney surgery on 8/4/21.  Kady Ly RN, CNRN

## 2021-08-02 NOTE — TELEPHONE ENCOUNTER
Called and spoke with patient who is debating postponing her kidney stone surgery secondary to her back compression fracture pain.  She is overwhelmed with the discomfort and is unsure if she should do the surgery or wait.  She will talk with her sons and will decide.  Will call patient tomorrow to discuss.  Elena Almaguer RN

## 2021-08-03 ENCOUNTER — TELEPHONE (OUTPATIENT)
Dept: NURSING | Facility: CLINIC | Age: 86
End: 2021-08-03

## 2021-08-03 ENCOUNTER — TELEPHONE (OUTPATIENT)
Dept: UROLOGY | Facility: CLINIC | Age: 86
End: 2021-08-03

## 2021-08-03 NOTE — TELEPHONE ENCOUNTER
Patient called stating that she will go forward with the surgical procedure. She has no other questions and will call with any issues.  Elena Almaguer RN

## 2021-08-04 ENCOUNTER — PREP FOR PROCEDURE (OUTPATIENT)
Dept: UROLOGY | Facility: CLINIC | Age: 86
End: 2021-08-04

## 2021-08-04 ENCOUNTER — APPOINTMENT (OUTPATIENT)
Dept: RADIOLOGY | Facility: HOSPITAL | Age: 86
End: 2021-08-04
Attending: UROLOGY
Payer: COMMERCIAL

## 2021-08-04 ENCOUNTER — ANESTHESIA EVENT (OUTPATIENT)
Dept: SURGERY | Facility: HOSPITAL | Age: 86
End: 2021-08-04
Payer: COMMERCIAL

## 2021-08-04 ENCOUNTER — ANESTHESIA (OUTPATIENT)
Dept: SURGERY | Facility: HOSPITAL | Age: 86
End: 2021-08-04
Payer: COMMERCIAL

## 2021-08-04 ENCOUNTER — HOSPITAL ENCOUNTER (OUTPATIENT)
Facility: HOSPITAL | Age: 86
Discharge: HOME | End: 2021-08-04
Attending: UROLOGY | Admitting: UROLOGY
Payer: COMMERCIAL

## 2021-08-04 VITALS
HEART RATE: 73 BPM | RESPIRATION RATE: 20 BRPM | OXYGEN SATURATION: 98 % | TEMPERATURE: 98.5 F | SYSTOLIC BLOOD PRESSURE: 121 MMHG | DIASTOLIC BLOOD PRESSURE: 55 MMHG

## 2021-08-04 DIAGNOSIS — N20.1 CALCULUS OF URETER: ICD-10-CM

## 2021-08-04 DIAGNOSIS — N39.0 E. COLI UTI: ICD-10-CM

## 2021-08-04 DIAGNOSIS — B96.20 E. COLI UTI: ICD-10-CM

## 2021-08-04 PROCEDURE — C1894 INTRO/SHEATH, NON-LASER: HCPCS | Performed by: UROLOGY

## 2021-08-04 PROCEDURE — 74420 UROGRAPHY RTRGR +-KUB: CPT

## 2021-08-04 PROCEDURE — 52332 CYSTOSCOPY AND TREATMENT: CPT | Mod: 22 | Performed by: UROLOGY

## 2021-08-04 PROCEDURE — 250N000025 HC SEVOFLURANE, PER MIN: Performed by: UROLOGY

## 2021-08-04 PROCEDURE — 360N000082 HC SURGERY LEVEL 2 W/ FLUORO, PER MIN: Performed by: UROLOGY

## 2021-08-04 PROCEDURE — 258N000003 HC RX IP 258 OP 636: Performed by: NURSE ANESTHETIST, CERTIFIED REGISTERED

## 2021-08-04 PROCEDURE — 710N000009 HC RECOVERY PHASE 1, LEVEL 1, PER MIN: Performed by: UROLOGY

## 2021-08-04 PROCEDURE — 250N000011 HC RX IP 250 OP 636: Performed by: NURSE ANESTHETIST, CERTIFIED REGISTERED

## 2021-08-04 PROCEDURE — 250N000013 HC RX MED GY IP 250 OP 250 PS 637: Performed by: ANESTHESIOLOGY

## 2021-08-04 PROCEDURE — P9041 ALBUMIN (HUMAN),5%, 50ML: HCPCS | Performed by: STUDENT IN AN ORGANIZED HEALTH CARE EDUCATION/TRAINING PROGRAM

## 2021-08-04 PROCEDURE — C1769 GUIDE WIRE: HCPCS | Performed by: UROLOGY

## 2021-08-04 PROCEDURE — 250N000011 HC RX IP 250 OP 636: Performed by: UROLOGY

## 2021-08-04 PROCEDURE — 710N000012 HC RECOVERY PHASE 2, PER MINUTE: Performed by: UROLOGY

## 2021-08-04 PROCEDURE — 999N000141 HC STATISTIC PRE-PROCEDURE NURSING ASSESSMENT: Performed by: UROLOGY

## 2021-08-04 PROCEDURE — 250N000009 HC RX 250: Performed by: ANESTHESIOLOGY

## 2021-08-04 PROCEDURE — 370N000017 HC ANESTHESIA TECHNICAL FEE, PER MIN: Performed by: UROLOGY

## 2021-08-04 PROCEDURE — 250N000011 HC RX IP 250 OP 636: Performed by: ANESTHESIOLOGY

## 2021-08-04 PROCEDURE — 272N000001 HC OR GENERAL SUPPLY STERILE: Performed by: UROLOGY

## 2021-08-04 PROCEDURE — 250N000009 HC RX 250: Performed by: NURSE ANESTHETIST, CERTIFIED REGISTERED

## 2021-08-04 PROCEDURE — 258N000003 HC RX IP 258 OP 636: Performed by: ANESTHESIOLOGY

## 2021-08-04 PROCEDURE — 250N000011 HC RX IP 250 OP 636: Performed by: STUDENT IN AN ORGANIZED HEALTH CARE EDUCATION/TRAINING PROGRAM

## 2021-08-04 RX ORDER — MEPERIDINE HYDROCHLORIDE 25 MG/ML
12.5 INJECTION INTRAMUSCULAR; INTRAVENOUS; SUBCUTANEOUS
Status: DISCONTINUED | OUTPATIENT
Start: 2021-08-04 | End: 2021-08-04 | Stop reason: HOSPADM

## 2021-08-04 RX ORDER — SODIUM CHLORIDE, SODIUM LACTATE, POTASSIUM CHLORIDE, CALCIUM CHLORIDE 600; 310; 30; 20 MG/100ML; MG/100ML; MG/100ML; MG/100ML
INJECTION, SOLUTION INTRAVENOUS CONTINUOUS
Status: DISCONTINUED | OUTPATIENT
Start: 2021-08-04 | End: 2021-08-04 | Stop reason: HOSPADM

## 2021-08-04 RX ORDER — NALOXONE HYDROCHLORIDE 0.4 MG/ML
0.2 INJECTION, SOLUTION INTRAMUSCULAR; INTRAVENOUS; SUBCUTANEOUS
Status: DISCONTINUED | OUTPATIENT
Start: 2021-08-04 | End: 2021-08-04 | Stop reason: HOSPADM

## 2021-08-04 RX ORDER — OXYCODONE HYDROCHLORIDE 5 MG/1
5 TABLET ORAL EVERY 4 HOURS PRN
Status: DISCONTINUED | OUTPATIENT
Start: 2021-08-04 | End: 2021-08-04 | Stop reason: HOSPADM

## 2021-08-04 RX ORDER — ONDANSETRON 4 MG/1
4 TABLET, ORALLY DISINTEGRATING ORAL EVERY 30 MIN PRN
Status: DISCONTINUED | OUTPATIENT
Start: 2021-08-04 | End: 2021-08-04 | Stop reason: HOSPADM

## 2021-08-04 RX ORDER — ONDANSETRON 2 MG/ML
INJECTION INTRAMUSCULAR; INTRAVENOUS PRN
Status: DISCONTINUED | OUTPATIENT
Start: 2021-08-04 | End: 2021-08-04

## 2021-08-04 RX ORDER — NALOXONE HYDROCHLORIDE 0.4 MG/ML
0.4 INJECTION, SOLUTION INTRAMUSCULAR; INTRAVENOUS; SUBCUTANEOUS
Status: DISCONTINUED | OUTPATIENT
Start: 2021-08-04 | End: 2021-08-04 | Stop reason: HOSPADM

## 2021-08-04 RX ORDER — LEVOFLOXACIN 5 MG/ML
500 INJECTION, SOLUTION INTRAVENOUS ONCE
Status: COMPLETED | OUTPATIENT
Start: 2021-08-04 | End: 2021-08-04

## 2021-08-04 RX ORDER — LIDOCAINE HYDROCHLORIDE 10 MG/ML
INJECTION, SOLUTION INFILTRATION; PERINEURAL
Status: DISCONTINUED | OUTPATIENT
Start: 2021-08-04 | End: 2021-08-04

## 2021-08-04 RX ORDER — DEXAMETHASONE SODIUM PHOSPHATE 10 MG/ML
INJECTION, SOLUTION INTRAMUSCULAR; INTRAVENOUS PRN
Status: DISCONTINUED | OUTPATIENT
Start: 2021-08-04 | End: 2021-08-04

## 2021-08-04 RX ORDER — FENTANYL CITRATE 50 UG/ML
INJECTION, SOLUTION INTRAMUSCULAR; INTRAVENOUS PRN
Status: DISCONTINUED | OUTPATIENT
Start: 2021-08-04 | End: 2021-08-04

## 2021-08-04 RX ORDER — LIDOCAINE HYDROCHLORIDE 20 MG/ML
INJECTION, SOLUTION INFILTRATION; PERINEURAL PRN
Status: DISCONTINUED | OUTPATIENT
Start: 2021-08-04 | End: 2021-08-04

## 2021-08-04 RX ORDER — ONDANSETRON 2 MG/ML
4 INJECTION INTRAMUSCULAR; INTRAVENOUS EVERY 30 MIN PRN
Status: DISCONTINUED | OUTPATIENT
Start: 2021-08-04 | End: 2021-08-04 | Stop reason: HOSPADM

## 2021-08-04 RX ORDER — FENTANYL CITRATE 50 UG/ML
50 INJECTION, SOLUTION INTRAMUSCULAR; INTRAVENOUS EVERY 5 MIN PRN
Status: DISCONTINUED | OUTPATIENT
Start: 2021-08-04 | End: 2021-08-04 | Stop reason: HOSPADM

## 2021-08-04 RX ORDER — ETOMIDATE 2 MG/ML
INJECTION INTRAVENOUS PRN
Status: DISCONTINUED | OUTPATIENT
Start: 2021-08-04 | End: 2021-08-04

## 2021-08-04 RX ORDER — ALBUMIN, HUMAN INJ 5% 5 %
SOLUTION INTRAVENOUS CONTINUOUS PRN
Status: DISCONTINUED | OUTPATIENT
Start: 2021-08-04 | End: 2021-08-04

## 2021-08-04 RX ORDER — HALOPERIDOL 5 MG/ML
1 INJECTION INTRAMUSCULAR
Status: DISCONTINUED | OUTPATIENT
Start: 2021-08-04 | End: 2021-08-04 | Stop reason: HOSPADM

## 2021-08-04 RX ORDER — HYDROMORPHONE HCL IN WATER/PF 6 MG/30 ML
0.2 PATIENT CONTROLLED ANALGESIA SYRINGE INTRAVENOUS EVERY 5 MIN PRN
Status: DISCONTINUED | OUTPATIENT
Start: 2021-08-04 | End: 2021-08-04 | Stop reason: HOSPADM

## 2021-08-04 RX ORDER — FENTANYL CITRATE 50 UG/ML
25 INJECTION, SOLUTION INTRAMUSCULAR; INTRAVENOUS EVERY 5 MIN PRN
Status: DISCONTINUED | OUTPATIENT
Start: 2021-08-04 | End: 2021-08-04 | Stop reason: HOSPADM

## 2021-08-04 RX ORDER — ACETAMINOPHEN 325 MG/1
975 TABLET ORAL ONCE
Status: COMPLETED | OUTPATIENT
Start: 2021-08-04 | End: 2021-08-04

## 2021-08-04 RX ORDER — LIDOCAINE 40 MG/G
CREAM TOPICAL
Status: DISCONTINUED | OUTPATIENT
Start: 2021-08-04 | End: 2021-08-04 | Stop reason: HOSPADM

## 2021-08-04 RX ADMIN — FENTANYL CITRATE 50 MCG: 50 INJECTION, SOLUTION INTRAMUSCULAR; INTRAVENOUS at 13:33

## 2021-08-04 RX ADMIN — SUGAMMADEX 100 MG: 100 INJECTION, SOLUTION INTRAVENOUS at 14:03

## 2021-08-04 RX ADMIN — ROCURONIUM BROMIDE 20 MG: 10 INJECTION, SOLUTION INTRAVENOUS at 13:33

## 2021-08-04 RX ADMIN — ROCURONIUM BROMIDE 10 MG: 10 INJECTION, SOLUTION INTRAVENOUS at 13:39

## 2021-08-04 RX ADMIN — LEVOFLOXACIN 500 MG: 5 INJECTION, SOLUTION INTRAVENOUS at 12:09

## 2021-08-04 RX ADMIN — LIDOCAINE HYDROCHLORIDE 5 ML: 10 INJECTION, SOLUTION INFILTRATION; PERINEURAL at 16:37

## 2021-08-04 RX ADMIN — PHENYLEPHRINE HYDROCHLORIDE 0.3 MCG/KG/MIN: 10 INJECTION INTRAVENOUS at 13:00

## 2021-08-04 RX ADMIN — FENTANYL CITRATE 25 MCG: 50 INJECTION, SOLUTION INTRAMUSCULAR; INTRAVENOUS at 13:01

## 2021-08-04 RX ADMIN — SODIUM CHLORIDE, POTASSIUM CHLORIDE, SODIUM LACTATE AND CALCIUM CHLORIDE: 600; 310; 30; 20 INJECTION, SOLUTION INTRAVENOUS at 12:04

## 2021-08-04 RX ADMIN — FENTANYL CITRATE 50 MCG: 50 INJECTION, SOLUTION INTRAMUSCULAR; INTRAVENOUS at 15:07

## 2021-08-04 RX ADMIN — LEVOFLOXACIN 500 MG: 5 INJECTION, SOLUTION INTRAVENOUS at 12:02

## 2021-08-04 RX ADMIN — DEXAMETHASONE SODIUM PHOSPHATE 10 MG: 10 INJECTION, SOLUTION INTRAMUSCULAR; INTRAVENOUS at 13:36

## 2021-08-04 RX ADMIN — ALBUMIN HUMAN: 0.05 INJECTION, SOLUTION INTRAVENOUS at 13:58

## 2021-08-04 RX ADMIN — LIDOCAINE HYDROCHLORIDE 60 MG: 20 INJECTION, SOLUTION INFILTRATION; PERINEURAL at 13:33

## 2021-08-04 RX ADMIN — ACETAMINOPHEN 975 MG: 325 TABLET ORAL at 11:59

## 2021-08-04 RX ADMIN — ONDANSETRON 4 MG: 2 INJECTION INTRAMUSCULAR; INTRAVENOUS at 13:39

## 2021-08-04 RX ADMIN — FENTANYL CITRATE 25 MCG: 50 INJECTION, SOLUTION INTRAMUSCULAR; INTRAVENOUS at 12:56

## 2021-08-04 RX ADMIN — ETOMIDATE 8 MG: 2 INJECTION, SOLUTION INTRAVENOUS at 13:34

## 2021-08-04 RX ADMIN — ONDANSETRON 4 MG: 2 INJECTION INTRAMUSCULAR; INTRAVENOUS at 15:05

## 2021-08-04 RX ADMIN — SODIUM CHLORIDE, POTASSIUM CHLORIDE, SODIUM LACTATE AND CALCIUM CHLORIDE: 600; 310; 30; 20 INJECTION, SOLUTION INTRAVENOUS at 15:51

## 2021-08-04 NOTE — ANESTHESIA CARE TRANSFER NOTE
Patient: Shirley Phipps    Procedure(s):  CYSTOURETEROSCOPY, RETROGRADE PYLEOGRAM RIGHT     Diagnosis: Calculus of ureter [N20.1]  E. coli UTI [N39.0, B96.20]  Diagnosis Additional Information: No value filed.    Anesthesia Type:   General     Note:    Oropharynx: oropharynx clear of all foreign objects  Level of Consciousness: awake  Oxygen Supplementation: face mask  Level of Supplemental Oxygen (L/min / FiO2): 8  Independent Airway: airway patency satisfactory and stable  Dentition: dentition unchanged  Vital Signs Stable: post-procedure vital signs reviewed and stable  Report to RN Given: handoff report given  Patient transferred to: PACU    Handoff Report: Identifed the Patient, Identified the Reponsible Provider, Reviewed the pertinent medical history, Discussed the surgical course, Reviewed Intra-OP anesthesia mangement and issues during anesthesia, Set expectations for post-procedure period and Allowed opportunity for questions and acknowledgement of understanding      Vitals: /66 (BP Location: Right arm)   Pulse 84   Temp 37.1  C (98.8  F) (Oral)   Resp 18   SpO2 99%     Vitals Value Taken Time   BP     Temp     Pulse     Resp     SpO2         Electronically Signed By: CHERYL Cardenas CRNA  August 4, 2021  2:20 PM

## 2021-08-04 NOTE — OP NOTE
[facility]  [facility] Operative Note    Shirley Phipps   August 4, 2021 8/4/2021   Desirae Thomson     Procedure Performed  Procedure(s):  CYSTOURETEROSCOPY, RETROGRADE PYLEOGRAM RIGHT   1. Cystoscopy  2. Retrograde Pyelography - Right  3. Diagnostic Ureteroscopy - Right      Pre-operative Diagnosis  Calculus of ureter [N20.1]  E. coli UTI [N39.0, B96.20]    Post-operative Diagnosis  1. passed right ureteral stone      Surgeon(s) and Role:     * Marbin Liu MD - Primary    Anesthesia Type  Not documented     Procedural Summary  Increased procedural complexity because of high risk anesthesia secondary to severe aortic stenosis  Estimation of stone clearance: stone not identified  Subjective stone composition: stone not identified  Renal papillae assessment: not observed  Unanticipated event/findings: spontaneous stone passage  Post-operative plan: return to clinic in 1 month without CT scan    Narrative    After prolonged period of chronic impaction and UTI, this fral elderly woman managed to pass her stone spontaneously since last imaging.    Procedural Details    Patient is brought to the surgical suite where Not documented anesthesia is induced. she is prepped and draped in standard fashion in lithotomy position.    Cystoscopy: Rigid cystoscopy is performed. Introitus is severely atrophic. Bladder is normal..     Retrograde Pyelography:  imaging fails to demonstrate radio-opaque distal ureteric stone consistent with pre-operative imaging. Right retrograde pyelography demonstrates normal unobstructed system without obvious stone or filling defect.     Ureteral Access: Right ureteral access is initiated with Bentson wire. Guide wire access to the kidney is assured.      Rigid Ureteroscopy: Rigid ureteroscope is inserted in right ureter. No evidence of stone or inflammation.      The patient was then taken to the recovery room in good condition.     Past Medical  History:   Diagnosis Date     Anemia      Aortic stenosis      Arthritis      Bicuspid aortic valve      Bladder infection      Cancer (H)     SQUAMOUS 2X     Closed burst fracture of lumbar vertebra (H) 03/26/2019    L4 BURST FX WITH RETROPULSION     Dyslipidemia      Fall 03/26/2019     GERD (gastroesophageal reflux disease)      History of transfusion     SHE DOESN'T THINK SHE HAD A TRANSFUSION REACTION     Hypertension         Patient Active Problem List   Diagnosis     Calculus of ureter     E. coli UTI        Estimated Blood Loss  .* No values recorded between 8/4/2021  1:49 PM and 8/4/2021  2:00 PM *     * No specimens in log *

## 2021-08-04 NOTE — INTERVAL H&P NOTE
The History and Physical has been reviewed, the patient has been examined and no changes have occurred in the patient's condition since the H & P was completed.     She was in the ED with mild lumbar compression fracture earlier this week. No neuro compromise noted at that time.

## 2021-08-04 NOTE — ANESTHESIA POSTPROCEDURE EVALUATION
Patient: Shirley Phipps    Procedure(s):  CYSTOURETEROSCOPY, RETROGRADE PYLEOGRAM RIGHT     Diagnosis:Calculus of ureter [N20.1]  E. coli UTI [N39.0, B96.20]  Diagnosis Additional Information: No value filed.    Anesthesia Type:  General    Note:  Disposition: Outpatient   Postop Pain Control: Uneventful            Sign Out: Well controlled pain   PONV: No   Neuro/Psych: Uneventful            Sign Out: Acceptable/Baseline neuro status   Airway/Respiratory: Uneventful            Sign Out: Acceptable/Baseline resp. status   CV/Hemodynamics: Uneventful            Sign Out: Acceptable CV status; No obvious hypovolemia; No obvious fluid overload   Other NRE: NONE   DID A NON-ROUTINE EVENT OCCUR? No    Event details/Postop Comments:  Strong radial pulses bilaterally in phase II.           Last vitals:  Vitals Value Taken Time   BP 83/41 08/04/21 1530   Temp 36.8  C (98.3  F) 08/04/21 1420   Pulse 76 08/04/21 1557   Resp 34 08/04/21 1557   SpO2 98 % 08/04/21 1554   Vitals shown include unvalidated device data.    Electronically Signed By: Ginny Andrade MD  August 4, 2021  4:35 PM

## 2021-08-04 NOTE — ANESTHESIA PROCEDURE NOTES
Airway         Procedure Start/Stop Times: 8/4/2021 1:36 PM  Staff -        CRNA: Loyda Nagel APRN CRNA       Other Anesthesia Staff: Amina Ch       Performed By: GISELLA  Consent for Airway        Urgency: elective  Indications and Patient Condition       Indications for airway management: randee-procedural       Induction type:intravenous       Mask difficulty assessment: 2 - vent by mask + OA or adjuvant +/- NMBA    Final Airway Details       Final airway type: endotracheal airway       Successful airway: ETT - single  Endotracheal Airway Details        ETT size (mm): 7.0       Cuffed: yes       Successful intubation technique: video laryngoscopy       VL Blade Size: Glidescope 3       Grade View of Cords: 1       Adjucts: stylet       Position: Right       Measured from: lips       Secured at (cm): 20       Bite block used: None    Post intubation assessment        Placement verified by: capnometry, equal breath sounds and chest rise        Number of attempts at approach: 1       Number of other approaches attempted: 0       Secured with: commercial tube fernandez and silk tape       Ease of procedure: easy       Dentition: Intact and Unchanged

## 2021-08-04 NOTE — ANESTHESIA PREPROCEDURE EVALUATION
Anesthesia Pre-Procedure Evaluation    Patient: Shirley Phipps   MRN: [id number] : 1935        Preoperative Diagnosis: Calculus of ureter [N20.1]  E. coli UTI [N39.0, B96.20]   Procedure : Procedure(s):  CYSTOURETEROSCOPY, WITH RETROGRADE PYELOGRAM, HOLMIUM LASER LITHOTRIPSY OF URETERAL CALCULUS, AND STENT INSERTION     Past Medical History:   Diagnosis Date     Anemia      Aortic stenosis      Arthritis      Bicuspid aortic valve      Bladder infection      Cancer (H)     SQUAMOUS 2X     Closed burst fracture of lumbar vertebra (H) 2019    L4 BURST FX WITH RETROPULSION     Dyslipidemia      Fall 2019     GERD (gastroesophageal reflux disease)      History of transfusion     SHE DOESN'T THINK SHE HAD A TRANSFUSION REACTION     Hypertension       Past Surgical History:   Procedure Laterality Date     C RECONSTR TOTAL SHOULDER IMPLANT Left 2016    Procedure: LEFT REVERSE TOTAL SHOULDER ARTHROPLASTY;  Surgeon: Maurilio Marcelino MD;  Location: [facility];  Service: Orthopedics     OTHER SURGICAL HISTORY Right     surgery for prior wrist fracture     OTHER SURGICAL HISTORY Right     surgery for prior knee fracture     PICC  2016           Allergies   Allergen Reactions     Cefdinir Hives     Clindamycin Nausea     C.Diff     Codeine Nausea and Vomiting     Tolerates oral morphine.     Flagyl [Metronidazole] Angioedema     Sulfa (Sulfonamide Antibiotics) [Sulfa Drugs] Nausea     Canker sores     Tramadol Unknown     Upset stomach      Social History     Tobacco Use     Smoking status: Never Smoker     Smokeless tobacco: Never Used   Substance Use Topics     Alcohol use: Yes     Alcohol/week: 7.0 standard drinks     Comment: Alcoholic Drinks/day: 1 shot of bee daily      Wt Readings from Last 1 Encounters:   21 40.4 kg (89 lb)        Anesthesia Evaluation   Pt has had prior anesthetic. Type: General.    No history of anesthetic complications       ROS/MED  HX  ENT/Pulmonary:  - neg pulmonary ROS     Neurologic: Comment: SDH  Compression fracture lumbar      Cardiovascular: Comment: The estimated left ventricular ejection fraction is 75%. This represents a increased ejection fraction. Mild hypertrophy noted. E/e' > 15, suggesting elevated LV filling pressures.    Aortic Valve: There is moderate global calcification with reduced excursion of the aortic valve present. Severe stenosis. No regurgitation is present.    Left Atrium: Left atrial volume is mildly increased.    (+) Dyslipidemia hypertension-----valvular problems/murmurs type: AS severe.     METS/Exercise Tolerance:     Hematologic:  - neg hematologic  ROS     Musculoskeletal:  - neg musculoskeletal ROS     GI/Hepatic:     (+) GERD,     Renal/Genitourinary:     (+) Nephrolithiasis ,     Endo:  - neg endo ROS     Psychiatric/Substance Use:     (+) psychiatric history anxiety     Infectious Disease:  - neg infectious disease ROS     Malignancy:  - neg malignancy ROS     Other:  - neg other ROS          Physical Exam    Airway  airway exam normal      Mallampati: II   TM distance: > 3 FB   Neck ROM: full     Respiratory Devices and Support         Dental  no notable dental history   Comment: Tiny chip      B=Bridge, C=Chipped, L=Loose, M=Missing    Cardiovascular       Comment: Loud systolic murmur   Rhythm and rate: regular   (+) murmur       Pulmonary   pulmonary exam normal        breath sounds clear to auscultation           OUTSIDE LABS:  CBC:   Lab Results   Component Value Date    WBC 12.0 (H) 07/17/2021    WBC 7.0 03/24/2021    HGB 11.8 07/17/2021    HGB 9.2 (L) 03/24/2021    HCT 37.3 07/17/2021    HCT 29.1 (L) 03/24/2021     (L) 07/17/2021    PLT 91 (L) 03/24/2021     BMP:   Lab Results   Component Value Date     07/17/2021     03/23/2021    POTASSIUM 4.3 07/17/2021    POTASSIUM 3.6 03/23/2021    CHLORIDE 110 (H) 07/17/2021    CHLORIDE 106 03/23/2021    CO2 22 07/17/2021    CO2 22  03/23/2021    BUN 16 07/17/2021    BUN 13 03/23/2021    CR 0.83 07/17/2021    CR 0.83 03/23/2021    GLC 92 07/17/2021     03/23/2021     COAGS: No results found for: PTT, INR, FIBR  POC: No results found for: BGM, HCG, HCGS  HEPATIC:   Lab Results   Component Value Date    ALBUMIN 4.1 07/17/2021    PROTTOTAL 7.1 07/17/2021    ALT <9 07/17/2021    AST 16 07/17/2021    ALKPHOS 56 07/17/2021    BILITOTAL 1.0 07/17/2021    ARINA 20 10/20/2020     OTHER:   Lab Results   Component Value Date    LACT 1.4 07/17/2021    LEIF 10.1 07/17/2021    MAG 2.7 (H) 02/12/2021    LIPASE <9 07/17/2021    TSH 0.69 10/04/2019    CRP 0.7 07/17/2021       Anesthesia Plan    ASA Status:  3      Anesthesia Type: General.     - Airway: ETT         Techniques and Equipment:     - Lines/Monitors: Arterial Line     Consents            Postoperative Care    Pain management: IV analgesics.        Comments:                Ginny Andrade MD

## 2021-08-04 NOTE — ANESTHESIA PROCEDURE NOTES
Arterial Line Procedure Note  Pre-Procedure   Staff -        Anesthesiologist:  Ginny Andrade MD       Performed By: anesthesiologist       Location: OR       Procedure Start/Stop Times: 8/4/2021 1:12 PM and 8/4/2021 1:31 PM       Pre-Anesthestic Checklist: patient identified, IV checked, risks and benefits discussed, informed consent, monitors and equipment checked and pre-op evaluation  Timeout:       Correct Patient: Yes        Correct Procedure: Yes        Correct Site: Yes        Correct Position: Yes   Procedure   Procedure: arterial line       Diagnosis: Nephrolithiasis with severe AS       Laterality: right       Insertion Site: radial.  Sterile Prep        Standard elements of sterile barrier followed       Skin prep: Chloraprep  Insertion/Injection        Technique: ultrasound guided and Seldinger Technique        1. Ultrasound was used to evaluate the access site.       2. Artery evaluated via ultrasound for patency/adequacy.       3. Using real-time ultrasound the needle/catheter was observed entering the artery/vein.       4. Permanent image was captured and entered into the patient's record.       5. The visualized structures were anatomically normal.       6. There were no apparent abnormal pathologic findings.       Catheter Type/Size: 20 G, 12 cm  Narrative         Secured by: suture       Tegaderm and Biopatch dressing used.       Complications: None apparent,      Arterial waveform: Yes   Comments:  Risks, benefits and alternatives were discussed with patient including hematoma, allergic reaction to LA, nerve injury, vessel injury and possible need to go to alternative site.    Very difficult placement. Attempts made brachially bilaterally. Good blood return on several attempts with inability to pass the wire. Eventual success in the right bachial.

## 2021-08-04 NOTE — TELEPHONE ENCOUNTER
Data:  Pt calling to report they have a procedure tomorrow with urology. They have recently fracture a disc in their lower back which is very painful. Pt is wondering what kind of procedure they are having tomorrow as no nurse has called them to discuss the procedure, how to prepare or when to arrive. Pt was prescribed prn morphine for the back fracture. Pt is also wondering if they could take the prn Morphine prior to going to their procedure for the back pain. Pt will not be going under general anesthesia but will be using a spinal block.     Action:   Writer did their best to explain the procedure to the pt. Pt advised the procedure is scheduled for 1115 tomorrow and no one has told them when to arrive. Writer has zero information on the procedure so advised showing up an hour before the procedure to be safe. Advised if pt is having back pain prior to procedure it is ok to take prn morphine but to just let the MD know they did this.      Response:   Pt verbalizes understanding and agrees with plan of care.    Roman Sanchez RN 8/3/2021 7:46 PM  [facility] Nurse Advisor    COVID 19 Nurse Triage Plan/Patient Instructions    Please be aware that novel coronavirus (COVID-19) may be circulating in the community. If you develop symptoms such as fever, cough, or SOB or if you have concerns about the presence of another infection including coronavirus (COVID-19), please contact your health care provider or visit [email or web address].     Disposition/Instructions    Additional COVID19 information to add for patients.   How can I protect others?  If you have symptoms (fever, cough, body aches or trouble breathing): Stay home and away from others (self-isolate) until:    At least 10 days have passed since your symptoms started, And     You ve had no fever--and no medicine that reduces fever--for 1 full day (24 hours), And      Your other symptoms have resolved (gotten better).     If you don t have symptoms,  "but a test showed that you have COVID-19 (you tested positive):    Stay home and away from others (self-isolate). Follow the tips under \"How do I self-isolate?\" below for 10 days (20 days if you have a weak immune system).    You don't need to be retested for COVID-19 before going back to school or work. As long as you're fever-free and feeling better, you can go back to school, work and other activities after waiting the 10 or 20 days.     How do I self-isolate?    Stay in your own room, even for meals. Use your own bathroom if you can.     Stay away from others in your home. No hugging, kissing or shaking hands. No visitors.    Don t go to work, school or anywhere else.     Clean  high touch  surfaces often (doorknobs, counters, handles, etc.). Use a household cleaning spray or wipes. You ll find a full list on the EPA website:  www.epa.gov/pesticide-registration/list-n-disinfectants-use-against-sars-cov-2.    Cover your mouth and nose with a mask, tissue or washcloth to avoid spreading germs.    Wash your hands and face often. Use soap and water.    Caregivers in these groups are at risk for severe illness due to COVID-19:  o People 65 years and older  o People who live in a nursing home or long-term care facility  o People with chronic disease (lung, heart, cancer, diabetes, kidney, liver, immunologic)  o People who have a weakened immune system, including those who:  - Are in cancer treatment  - Take medicine that weakens the immune system, such as corticosteroids  - Had a bone marrow or organ transplant  - Have an immune deficiency  - Have poorly controlled HIV or AIDS  - Are obese (body mass index of 40 or higher)  - Smoke regularly    Caregivers should wear gloves while washing dishes, handling laundry and cleaning bedrooms and bathrooms.    Use caution when washing and drying laundry: Don t shake dirty laundry, and use the warmest water setting that you can.    For more tips, go to " www.cdc.gov/coronavirus/2019-ncov/downloads/10Things.pdf.    How can I take care of myself?  1. Get lots of rest. Drink extra fluids (unless a doctor has told you not to).     2. Take Tylenol (acetaminophen) for fever or pain. If you have liver or kidney problems, ask your family doctor if it s okay to take Tylenol.     Adults can take either:     650 mg (two 325 mg pills) every 4 to 6 hours, or     1,000 mg (two 500 mg pills) every 8 hours as needed.     Note: Don t take more than 3,000 mg in one day.   Acetaminophen is found in many medicines (both prescribed and over-the-counter medicines). Read all labels to be sure you don t take too much.     For children, check the Tylenol bottle for the right dose. The dose is based on the child s age or weight.    3. If you have other health problems (like cancer, heart failure, an organ transplant or severe kidney disease): Call your specialty clinic if you don t feel better in the next 2 days.    4. Know when to call 911: Emergency warning signs include:    Trouble breathing or shortness of breath    Pain or pressure in the chest that doesn t go away    Feeling confused like you haven t felt before, or not being able to wake up    Bluish-colored lips or face    What are the symptoms of COVID-19?     The most common symptoms are cough, fever and trouble breathing.     Less common symptoms include body aches, chills, diarrhea (loose, watery poops), fatigue (feeling very tired), headache, runny nose, sore throat and loss of smell.    COVID-19 can cause severe coughing (bronchitis) and lung infection (pneumonia).    How does it spread?     The virus may spread when a person coughs or sneezes into the air. The virus can travel about 6 feet this way, and it can live on surfaces.      Common  (household disinfectants) will kill the virus.    Who is at risk?  Anyone can catch COVID-19 if they re around someone who has the virus.    How can others protect themselves?      Stay away from people who have COVID-19 (or symptoms of COVID-19).    Wash hands often with soap and water. Or, use hand  with at least 60% alcohol.    Avoid touching the eyes, nose or mouth.     Wear a face mask when you go out in public, when sick or when caring for a sick person.    Where can I get more information?    [facility]: About COVID-19: [email or web address]    CDC: What to Do If You re Sick: www.cdc.gov/coronavirus/2019-ncov/about/steps-when-sick.html    CDC: Ending Home Isolation: www.cdc.gov/coronavirus/2019-ncov/hcp/disposition-in-home-patients.html     CDC: Caring for Someone: www.cdc.gov/coronavirus/2019-ncov/if-you-are-sick/care-for-someone.html     [facility]: Interim Guidance for Hospital Discharge to Home: www.health.[facility].[place]./diseases/coronavirus/hcp/hospdischarge.pdf    [facility] clinical trials (COVID-19 research studies): [email or web address].[facility]-clinical-trials     Below are the COVID-19 hotlines at the [place] Department of Health ([facility]). Interpreters are available.   o For health questions: Call 874-044-8269 or 1-412.985.7226 (7 a.m. to 7 p.m.)  o For questions about schools and childcare: Call 458-669-6351 or 1-591.473.2905 (7 a.m. to 7 p.m.)          Thank you for taking steps to prevent the spread of this virus.  o Limit your contact with others.  o Wear a simple mask to cover your cough.  o Wash your hands well and often.    Resources    [facility]: About COVID-19: [email or web address]    CDC: What to Do If You're Sick: www.cdc.gov/coronavirus/2019-ncov/about/steps-when-sick.html    CDC: Ending Home Isolation: www.cdc.gov/coronavirus/2019-ncov/hcp/disposition-in-home-patients.html     CDC: Caring for Someone: www.cdc.gov/coronavirus/2019-ncov/if-you-are-sick/care-for-someone.html     [facility]: Interim Guidance for Hospital Discharge to Home: www.[facility]/diseases/coronavirus/hcp/hospdischarge.pdf    [facility]  [place] clinical trials (COVID-19 research studies): [email or web address]-clinical-trials     Below are the COVID-19 hotlines at the [facility] ([facility]). Interpreters are available.   o For health questions: Call 519-976-0721 or 1-899.721.1763 (7 a.m. to 7 p.m.)  o For questions about schools and childcare: Call 416-361-0012 or 1-619.629.5108 (7 a.m. to 7 p.m.)

## 2021-08-06 ENCOUNTER — APPOINTMENT (OUTPATIENT)
Dept: RADIOLOGY | Facility: HOSPITAL | Age: 86
DRG: 292 | End: 2021-08-06
Attending: FAMILY MEDICINE
Payer: COMMERCIAL

## 2021-08-06 ENCOUNTER — HOSPITAL ENCOUNTER (INPATIENT)
Facility: HOSPITAL | Age: 86
LOS: 1 days | Discharge: HOME HEALTH | DRG: 292 | End: 2021-08-08
Attending: FAMILY MEDICINE | Admitting: HOSPITALIST
Payer: COMMERCIAL

## 2021-08-06 ENCOUNTER — NURSE TRIAGE (OUTPATIENT)
Dept: NURSING | Facility: CLINIC | Age: 86
End: 2021-08-06

## 2021-08-06 DIAGNOSIS — R09.02 HYPOXIA: ICD-10-CM

## 2021-08-06 DIAGNOSIS — R50.9 FEVER, UNSPECIFIED FEVER CAUSE: ICD-10-CM

## 2021-08-06 DIAGNOSIS — R79.89 ELEVATED TROPONIN: ICD-10-CM

## 2021-08-06 LAB
ANION GAP SERPL CALCULATED.3IONS-SCNC: 7 MMOL/L (ref 5–18)
BASOPHILS # BLD MANUAL: 0 10E3/UL (ref 0–0.2)
BASOPHILS NFR BLD MANUAL: 0 %
BUN SERPL-MCNC: 21 MG/DL (ref 8–28)
BURR CELLS BLD QL SMEAR: SLIGHT
CALCIUM SERPL-MCNC: 10 MG/DL (ref 8.5–10.5)
CHLORIDE BLD-SCNC: 107 MMOL/L (ref 98–107)
CO2 SERPL-SCNC: 21 MMOL/L (ref 22–31)
CREAT SERPL-MCNC: 0.91 MG/DL (ref 0.6–1.1)
ELLIPTOCYTES BLD QL SMEAR: SLIGHT
EOSINOPHIL # BLD MANUAL: 0 10E3/UL (ref 0–0.7)
EOSINOPHIL NFR BLD MANUAL: 0 %
ERYTHROCYTE [DISTWIDTH] IN BLOOD BY AUTOMATED COUNT: 13.6 % (ref 10–15)
GFR SERPL CREATININE-BSD FRML MDRD: 58 ML/MIN/1.73M2
GLUCOSE BLD-MCNC: 101 MG/DL (ref 70–125)
HCT VFR BLD AUTO: 28.2 % (ref 35–47)
HGB BLD-MCNC: 9 G/DL (ref 11.7–15.7)
LACTATE SERPL-SCNC: 1.2 MMOL/L (ref 0.7–2)
LYMPHOCYTES # BLD MANUAL: 1.4 10E3/UL (ref 0.8–5.3)
LYMPHOCYTES NFR BLD MANUAL: 16 %
MAGNESIUM SERPL-MCNC: 1.6 MG/DL (ref 1.8–2.6)
MCH RBC QN AUTO: 29.7 PG (ref 26.5–33)
MCHC RBC AUTO-ENTMCNC: 31.9 G/DL (ref 31.5–36.5)
MCV RBC AUTO: 93 FL (ref 78–100)
MONOCYTES # BLD MANUAL: 0.8 10E3/UL (ref 0–1.3)
MONOCYTES NFR BLD MANUAL: 9 %
NEUTROPHILS # BLD MANUAL: 6.5 10E3/UL (ref 1.6–8.3)
NEUTROPHILS NFR BLD MANUAL: 75 %
PLAT MORPH BLD: ABNORMAL
PLATELET # BLD AUTO: 110 10E3/UL (ref 150–450)
POTASSIUM BLD-SCNC: 4.7 MMOL/L (ref 3.5–5)
RBC # BLD AUTO: 3.03 10E6/UL (ref 3.8–5.2)
RBC MORPH BLD: ABNORMAL
SARS-COV-2 RNA RESP QL NAA+PROBE: NEGATIVE
SODIUM SERPL-SCNC: 135 MMOL/L (ref 136–145)
WBC # BLD AUTO: 8.7 10E3/UL (ref 4–11)

## 2021-08-06 PROCEDURE — 51798 US URINE CAPACITY MEASURE: CPT

## 2021-08-06 PROCEDURE — 99285 EMERGENCY DEPT VISIT HI MDM: CPT | Mod: 25

## 2021-08-06 PROCEDURE — 83880 ASSAY OF NATRIURETIC PEPTIDE: CPT | Performed by: STUDENT IN AN ORGANIZED HEALTH CARE EDUCATION/TRAINING PROGRAM

## 2021-08-06 PROCEDURE — C9803 HOPD COVID-19 SPEC COLLECT: HCPCS

## 2021-08-06 PROCEDURE — 36415 COLL VENOUS BLD VENIPUNCTURE: CPT | Performed by: STUDENT IN AN ORGANIZED HEALTH CARE EDUCATION/TRAINING PROGRAM

## 2021-08-06 PROCEDURE — 84484 ASSAY OF TROPONIN QUANT: CPT | Performed by: STUDENT IN AN ORGANIZED HEALTH CARE EDUCATION/TRAINING PROGRAM

## 2021-08-06 PROCEDURE — 71045 X-RAY EXAM CHEST 1 VIEW: CPT

## 2021-08-06 PROCEDURE — 85027 COMPLETE CBC AUTOMATED: CPT | Performed by: FAMILY MEDICINE

## 2021-08-06 PROCEDURE — 87040 BLOOD CULTURE FOR BACTERIA: CPT | Performed by: FAMILY MEDICINE

## 2021-08-06 PROCEDURE — 83735 ASSAY OF MAGNESIUM: CPT | Performed by: FAMILY MEDICINE

## 2021-08-06 PROCEDURE — 80048 BASIC METABOLIC PNL TOTAL CA: CPT | Performed by: FAMILY MEDICINE

## 2021-08-06 PROCEDURE — 36415 COLL VENOUS BLD VENIPUNCTURE: CPT | Performed by: FAMILY MEDICINE

## 2021-08-06 PROCEDURE — 87635 SARS-COV-2 COVID-19 AMP PRB: CPT | Performed by: FAMILY MEDICINE

## 2021-08-06 PROCEDURE — 83605 ASSAY OF LACTIC ACID: CPT | Performed by: FAMILY MEDICINE

## 2021-08-06 RX ORDER — SODIUM CHLORIDE 9 MG/ML
INJECTION, SOLUTION INTRAVENOUS CONTINUOUS
Status: DISCONTINUED | OUTPATIENT
Start: 2021-08-06 | End: 2021-08-07 | Stop reason: ALTCHOICE

## 2021-08-06 ASSESSMENT — ENCOUNTER SYMPTOMS
CONSTIPATION: 1
ARTHRALGIAS: 1
FEVER: 1
COUGH: 0
ABDOMINAL DISTENTION: 1
BACK PAIN: 1
VOMITING: 0
DIARRHEA: 0
DYSURIA: 0

## 2021-08-06 ASSESSMENT — MIFFLIN-ST. JEOR: SCORE: 816.02

## 2021-08-07 ENCOUNTER — APPOINTMENT (OUTPATIENT)
Dept: CARDIOLOGY | Facility: HOSPITAL | Age: 86
DRG: 292 | End: 2021-08-07
Attending: HOSPITALIST
Payer: COMMERCIAL

## 2021-08-07 PROBLEM — R50.9 FEVER, UNSPECIFIED FEVER CAUSE: Status: ACTIVE | Noted: 2021-08-07

## 2021-08-07 LAB
ALBUMIN SERPL-MCNC: 3.4 G/DL (ref 3.5–5)
ALBUMIN UR-MCNC: NEGATIVE MG/DL
ALP SERPL-CCNC: 67 U/L (ref 45–120)
ALT SERPL W P-5'-P-CCNC: <9 U/L (ref 0–45)
ANION GAP SERPL CALCULATED.3IONS-SCNC: 9 MMOL/L (ref 5–18)
APPEARANCE UR: CLEAR
AST SERPL W P-5'-P-CCNC: 13 U/L (ref 0–40)
BACTERIA #/AREA URNS HPF: ABNORMAL /HPF
BILIRUB SERPL-MCNC: 0.8 MG/DL (ref 0–1)
BILIRUB UR QL STRIP: NEGATIVE
BNP SERPL-MCNC: 2248 PG/ML (ref 0–167)
BUN SERPL-MCNC: 19 MG/DL (ref 8–28)
CALCIUM SERPL-MCNC: 9 MG/DL (ref 8.5–10.5)
CHLORIDE BLD-SCNC: 108 MMOL/L (ref 98–107)
CO2 SERPL-SCNC: 20 MMOL/L (ref 22–31)
COLOR UR AUTO: ABNORMAL
CREAT SERPL-MCNC: 0.82 MG/DL (ref 0.6–1.1)
GFR SERPL CREATININE-BSD FRML MDRD: 65 ML/MIN/1.73M2
GLUCOSE BLD-MCNC: 95 MG/DL (ref 70–125)
GLUCOSE UR STRIP-MCNC: NEGATIVE MG/DL
HGB UR QL STRIP: ABNORMAL
KETONES UR STRIP-MCNC: NEGATIVE MG/DL
LEUKOCYTE ESTERASE UR QL STRIP: ABNORMAL
LVEF ECHO: NORMAL
MAGNESIUM SERPL-MCNC: 1.6 MG/DL (ref 1.8–2.6)
MUCOUS THREADS #/AREA URNS LPF: PRESENT /LPF
NITRATE UR QL: NEGATIVE
PH UR STRIP: 5 [PH] (ref 5–7)
POTASSIUM BLD-SCNC: 4.2 MMOL/L (ref 3.5–5)
PROT SERPL-MCNC: 6 G/DL (ref 6–8)
RBC URINE: 25 /HPF
SODIUM SERPL-SCNC: 137 MMOL/L (ref 136–145)
SP GR UR STRIP: 1.01 (ref 1–1.03)
TROPONIN I SERPL-MCNC: 0.58 NG/ML (ref 0–0.29)
TROPONIN I SERPL-MCNC: 0.64 NG/ML (ref 0–0.29)
UROBILINOGEN UR STRIP-MCNC: <2 MG/DL
WBC URINE: 8 /HPF

## 2021-08-07 PROCEDURE — 93306 TTE W/DOPPLER COMPLETE: CPT

## 2021-08-07 PROCEDURE — 93306 TTE W/DOPPLER COMPLETE: CPT | Mod: 26 | Performed by: INTERNAL MEDICINE

## 2021-08-07 PROCEDURE — 81001 URINALYSIS AUTO W/SCOPE: CPT | Performed by: FAMILY MEDICINE

## 2021-08-07 PROCEDURE — 250N000011 HC RX IP 250 OP 636: Performed by: STUDENT IN AN ORGANIZED HEALTH CARE EDUCATION/TRAINING PROGRAM

## 2021-08-07 PROCEDURE — 96361 HYDRATE IV INFUSION ADD-ON: CPT

## 2021-08-07 PROCEDURE — 84484 ASSAY OF TROPONIN QUANT: CPT | Performed by: INTERNAL MEDICINE

## 2021-08-07 PROCEDURE — 83735 ASSAY OF MAGNESIUM: CPT | Performed by: INTERNAL MEDICINE

## 2021-08-07 PROCEDURE — 99223 1ST HOSP IP/OBS HIGH 75: CPT | Performed by: HOSPITALIST

## 2021-08-07 PROCEDURE — 250N000013 HC RX MED GY IP 250 OP 250 PS 637: Performed by: INTERNAL MEDICINE

## 2021-08-07 PROCEDURE — 250N000011 HC RX IP 250 OP 636: Performed by: INTERNAL MEDICINE

## 2021-08-07 PROCEDURE — 93005 ELECTROCARDIOGRAM TRACING: CPT | Performed by: STUDENT IN AN ORGANIZED HEALTH CARE EDUCATION/TRAINING PROGRAM

## 2021-08-07 PROCEDURE — 36415 COLL VENOUS BLD VENIPUNCTURE: CPT | Performed by: INTERNAL MEDICINE

## 2021-08-07 PROCEDURE — 99223 1ST HOSP IP/OBS HIGH 75: CPT | Performed by: INTERNAL MEDICINE

## 2021-08-07 PROCEDURE — 87086 URINE CULTURE/COLONY COUNT: CPT | Performed by: HOSPITALIST

## 2021-08-07 PROCEDURE — 80053 COMPREHEN METABOLIC PANEL: CPT | Performed by: INTERNAL MEDICINE

## 2021-08-07 PROCEDURE — 250N000009 HC RX 250: Performed by: INTERNAL MEDICINE

## 2021-08-07 PROCEDURE — 250N000011 HC RX IP 250 OP 636: Performed by: HOSPITALIST

## 2021-08-07 PROCEDURE — 258N000003 HC RX IP 258 OP 636: Performed by: FAMILY MEDICINE

## 2021-08-07 PROCEDURE — 96375 TX/PRO/DX INJ NEW DRUG ADDON: CPT

## 2021-08-07 PROCEDURE — 210N000001 HC R&B IMCU HEART CARE

## 2021-08-07 PROCEDURE — 96374 THER/PROPH/DIAG INJ IV PUSH: CPT

## 2021-08-07 RX ORDER — HEPARIN SODIUM 5000 [USP'U]/.5ML
5000 INJECTION, SOLUTION INTRAVENOUS; SUBCUTANEOUS EVERY 12 HOURS
Status: DISCONTINUED | OUTPATIENT
Start: 2021-08-07 | End: 2021-08-08 | Stop reason: HOSPADM

## 2021-08-07 RX ORDER — ONDANSETRON 4 MG/1
4 TABLET, ORALLY DISINTEGRATING ORAL EVERY 6 HOURS PRN
Status: DISCONTINUED | OUTPATIENT
Start: 2021-08-07 | End: 2021-08-08 | Stop reason: HOSPADM

## 2021-08-07 RX ORDER — POLYETHYLENE GLYCOL 3350 17 G/17G
17 POWDER, FOR SOLUTION ORAL DAILY PRN
Status: DISCONTINUED | OUTPATIENT
Start: 2021-08-07 | End: 2021-08-08 | Stop reason: HOSPADM

## 2021-08-07 RX ORDER — FAMOTIDINE 20 MG/1
20 TABLET, FILM COATED ORAL DAILY PRN
Status: DISCONTINUED | OUTPATIENT
Start: 2021-08-07 | End: 2021-08-08 | Stop reason: HOSPADM

## 2021-08-07 RX ORDER — MORPHINE SULFATE 15 MG/1
7.5-15 TABLET ORAL EVERY 6 HOURS PRN
Status: DISCONTINUED | OUTPATIENT
Start: 2021-08-07 | End: 2021-08-08 | Stop reason: HOSPADM

## 2021-08-07 RX ORDER — ONDANSETRON 2 MG/ML
4 INJECTION INTRAMUSCULAR; INTRAVENOUS EVERY 6 HOURS PRN
Status: DISCONTINUED | OUTPATIENT
Start: 2021-08-07 | End: 2021-08-08 | Stop reason: HOSPADM

## 2021-08-07 RX ORDER — CALCIUM CARBONATE 500 MG/1
1000 TABLET, CHEWABLE ORAL 4 TIMES DAILY PRN
Status: DISCONTINUED | OUTPATIENT
Start: 2021-08-07 | End: 2021-08-08 | Stop reason: HOSPADM

## 2021-08-07 RX ORDER — FUROSEMIDE 10 MG/ML
20 INJECTION INTRAMUSCULAR; INTRAVENOUS ONCE
Status: COMPLETED | OUTPATIENT
Start: 2021-08-07 | End: 2021-08-07

## 2021-08-07 RX ORDER — MAGNESIUM SULFATE HEPTAHYDRATE 40 MG/ML
2 INJECTION, SOLUTION INTRAVENOUS ONCE
Status: COMPLETED | OUTPATIENT
Start: 2021-08-07 | End: 2021-08-07

## 2021-08-07 RX ORDER — ACETAMINOPHEN 650 MG/1
650 SUPPOSITORY RECTAL EVERY 6 HOURS PRN
Status: DISCONTINUED | OUTPATIENT
Start: 2021-08-07 | End: 2021-08-08 | Stop reason: HOSPADM

## 2021-08-07 RX ORDER — PREDNISOLONE ACETATE 10 MG/ML
1 SUSPENSION/ DROPS OPHTHALMIC 2 TIMES DAILY
COMMUNITY
End: 2022-01-01

## 2021-08-07 RX ORDER — PREDNISOLONE ACETATE 10 MG/ML
1 SUSPENSION/ DROPS OPHTHALMIC 2 TIMES DAILY
Status: DISCONTINUED | OUTPATIENT
Start: 2021-08-07 | End: 2021-08-08 | Stop reason: HOSPADM

## 2021-08-07 RX ORDER — FUROSEMIDE 10 MG/ML
20 INJECTION INTRAMUSCULAR; INTRAVENOUS EVERY 12 HOURS
Status: DISCONTINUED | OUTPATIENT
Start: 2021-08-07 | End: 2021-08-08

## 2021-08-07 RX ORDER — GABAPENTIN 300 MG/1
300 CAPSULE ORAL 3 TIMES DAILY
Status: DISCONTINUED | OUTPATIENT
Start: 2021-08-07 | End: 2021-08-08 | Stop reason: HOSPADM

## 2021-08-07 RX ORDER — IBUPROFEN 200 MG
200 TABLET ORAL EVERY 6 HOURS PRN
Status: DISCONTINUED | OUTPATIENT
Start: 2021-08-07 | End: 2021-08-08 | Stop reason: HOSPADM

## 2021-08-07 RX ORDER — BISACODYL 10 MG
10 SUPPOSITORY, RECTAL RECTAL DAILY PRN
Status: DISCONTINUED | OUTPATIENT
Start: 2021-08-07 | End: 2021-08-08 | Stop reason: HOSPADM

## 2021-08-07 RX ORDER — ACETAMINOPHEN 325 MG/1
650 TABLET ORAL EVERY 6 HOURS PRN
Status: DISCONTINUED | OUTPATIENT
Start: 2021-08-07 | End: 2021-08-08 | Stop reason: HOSPADM

## 2021-08-07 RX ORDER — LIDOCAINE 4 G/G
1 PATCH TOPICAL DAILY PRN
Status: DISCONTINUED | OUTPATIENT
Start: 2021-08-07 | End: 2021-08-08 | Stop reason: HOSPADM

## 2021-08-07 RX ORDER — FAMOTIDINE 20 MG/1
20 TABLET, FILM COATED ORAL 2 TIMES DAILY PRN
Status: DISCONTINUED | OUTPATIENT
Start: 2021-08-07 | End: 2021-08-07

## 2021-08-07 RX ORDER — LIDOCAINE 40 MG/G
CREAM TOPICAL
Status: DISCONTINUED | OUTPATIENT
Start: 2021-08-07 | End: 2021-08-08 | Stop reason: HOSPADM

## 2021-08-07 RX ORDER — GABAPENTIN 300 MG/1
300 CAPSULE ORAL 3 TIMES DAILY
COMMUNITY

## 2021-08-07 RX ADMIN — FUROSEMIDE 20 MG: 10 INJECTION, SOLUTION INTRAVENOUS at 03:37

## 2021-08-07 RX ADMIN — IBUPROFEN 200 MG: 200 TABLET, FILM COATED ORAL at 08:38

## 2021-08-07 RX ADMIN — SODIUM CHLORIDE: 9 INJECTION, SOLUTION INTRAVENOUS at 02:09

## 2021-08-07 RX ADMIN — Medication 1 MG: at 22:12

## 2021-08-07 RX ADMIN — Medication 7.5 MG: at 22:19

## 2021-08-07 RX ADMIN — SODIUM CHLORIDE 1000 ML: 9 INJECTION, SOLUTION INTRAVENOUS at 00:06

## 2021-08-07 RX ADMIN — PREDNISOLONE ACETATE 1 DROP: 10 SUSPENSION/ DROPS OPHTHALMIC at 22:12

## 2021-08-07 RX ADMIN — PREDNISOLONE ACETATE 1 DROP: 10 SUSPENSION/ DROPS OPHTHALMIC at 10:28

## 2021-08-07 RX ADMIN — FUROSEMIDE 20 MG: 10 INJECTION, SOLUTION INTRAVENOUS at 18:37

## 2021-08-07 RX ADMIN — LIDOCAINE 1 PATCH: 246 PATCH TOPICAL at 08:38

## 2021-08-07 RX ADMIN — HEPARIN SODIUM 5000 UNITS: 10000 INJECTION, SOLUTION INTRAVENOUS; SUBCUTANEOUS at 08:25

## 2021-08-07 RX ADMIN — GABAPENTIN 300 MG: 300 CAPSULE ORAL at 15:28

## 2021-08-07 RX ADMIN — HEPARIN SODIUM 5000 UNITS: 10000 INJECTION, SOLUTION INTRAVENOUS; SUBCUTANEOUS at 20:09

## 2021-08-07 RX ADMIN — MAGNESIUM SULFATE HEPTAHYDRATE 2 G: 40 INJECTION, SOLUTION INTRAVENOUS at 15:28

## 2021-08-07 RX ADMIN — GABAPENTIN 300 MG: 300 CAPSULE ORAL at 08:38

## 2021-08-07 RX ADMIN — GABAPENTIN 300 MG: 300 CAPSULE ORAL at 20:09

## 2021-08-07 ASSESSMENT — ACTIVITIES OF DAILY LIVING (ADL): DEPENDENT_IADLS:: CLEANING;LAUNDRY;SHOPPING;TRANSPORTATION;INCONTINENCE

## 2021-08-07 NOTE — PROGRESS NOTES
[facility]    After midnight - Hospitalist Service    Assessment and Plan    Active Problems:    Hypoxia    Fever, unspecified fever cause    Shirley Phipps is a 85 year old old female with h/o She has history of severe aortic stenosis, chronic pain, mood disorder came in for evaluation of fever and developed hypoxia secondary to volume overload     #Acute CHF exacerbation  - She has known severe aortic stenosis, no recent echo that I can see,   - echo   - BNP was 2200  - IV Lasix  - O2   - cardiology     #Fever one further   This was her chief complaint. Reportedly 102F at home.  Not febrile here.  Covid negative.  UA with possible infection, recent urology procedure.  - pending cx      #Elevated troponin Likely demand ischemia secondary to above issues  - echo   - cards following  - gentle diuresis      #Recent L2 compression fracture  Unsure of details.  She is currently wearing a TLSO.  She is supposed to see Dr. Monico beebe in the neurosurgery clinic, she is their long-term patient  Home morphine, gabapentin     #Underweight     #Hyponatremia  Mild  - from fluid overload   - trend   VTE prophylaxis:  Heparin SQ  DIET: Orders Placed This Encounter      Low Saturated Fat Na <2400 mg      Drains/Lines: none  Weight bearing status: WBAT  Disposition/Barriers to discharge: pending further cardiology recs   Code Status: DNR/DNI       B/P: 125/58, T: 98.2, P: 75, R: 20     PERTINENT LABS  Most Recent 3 CBC's:Recent Labs   Lab Test 08/06/21  2254 07/17/21  1025 03/24/21  0712   WBC 8.7 12.0* 7.0   HGB 9.0* 11.8 9.2*   MCV 93 92 94   * 114* 91*     Most Recent 3 BMP's:Recent Labs   Lab Test 08/07/21  1240 08/06/21  2254 07/17/21  1025    135* 142   POTASSIUM 4.2 4.7 4.3   CHLORIDE 108* 107 110*   CO2 20* 21* 22   BUN 19 21 16   CR 0.82 0.91 0.83   ANIONGAP 9 7 10   LEIF 9.0 10.0 10.1   GLC 95 101 92     Most Recent 2 LFT's:Recent Labs   Lab Test 08/07/21  1240 07/17/21  1025   AST  13 16   ALT <9 <9   ALKPHOS 67 56   BILITOTAL 0.8 1.0     Most Recent 3 INR's:No lab results found.  Most Recent 3 Troponin's:No lab results found.  Most Recent 3 BNP's:No lab results found.  Most Recent D-dimer:No lab results found.  Recent Results (from the past 24 hour(s))   XR Chest Port 1 View    Narrative    EXAM: XR CHEST PORT 1 VIEW  LOCATION: [facility]  DATE/TIME: 2021 11:54 PM    INDICATION: fever  COMPARISON: 02/10/2021.      Impression    IMPRESSION: Stable cardiomegaly. Interstitial prominence consistent with mild edema. Small bilateral pleural effusions and mild bibasilar atelectasis. Reverse arthroplasty of the left glenohumeral joint.   Echocardiogram Complete   Result Value    LVEF  60-65%    Narrative    [id number]  [id number]  [id number]  [id number]^MARIANELA^CORRINA^DANISH                                                                       Version 2     [place] [address]     Name: TEO KO  MRN: [id number]  : 1935  Study Date: 2021 09:15 AM  Age: 85 yrs  Gender: Female  Patient Location: [facility]  Reason For Study: CHF, Aortic Valve Disorder  Ordering Physician: CORRINA BEAR  Performed By: RORY     BSA: 1.4 m2  Height: 63 in  Weight: 90 lb  HR: 83  ______________________________________________________________________________  ______________________________________________________________________________  Interpretation Summary     Left ventricular size, wall motion and function are normal. The ejection  fraction is 60-65%. There is moderate to severe concentric left ventricular  hypertrophy. Diastolic Doppler findings (E/E' ratio and/or other parameters)  suggest left ventricular filling pressures are increased.  Normal right ventricle size and systolic function.  The left atrium is moderately dilated.  Severe valvular aortic stenosis.  The mean AoV pressure gradient is 44mmHg.  There is mild mitral  stenosis.  There is mild to moderate (1-2+) mitral regurgitation.  There is mild to moderate (1-2+) tricuspid regurgitation.  The estimated pulmonary artery systolic pressure is 56 mmHg.  Small pericardial effusion  There are no echocardiographic indications of cardiac tamponade.  No previous study for comparison.     ______________________________________________________________________________  I      WMSI = 1.00     % Normal = 100     X - Cannot   0 -                      (2) - Mildly 2 -          Segments  Size  Interpret    Hyperkinetic 1 - Normal  Hypokinetic  Hypokinetic  1-2     small                                                     7 -          3-5      moderate  3 - Akinetic 4 -          5 -         6 - Akinetic Dyskinetic   6-14    large               Dyskinetic   Aneurysmal  w/scar       w/scar       15-16   diffuse     Left Ventricle  Left ventricular size, wall motion and function are normal. The ejection  fraction is 60-65%. There is moderate to severe concentric left ventricular  hypertrophy. Diastolic Doppler findings (E/E' ratio and/or other parameters)  suggest left ventricular filling pressures are increased.     Right Ventricle  Normal right ventricle size and systolic function.     Atria  The left atrium is moderately dilated. The right atrium is mildly dilated.  There is no atrial shunt seen.     Mitral Valve  The mitral valve leaflets appear thickened, but open well. There is mild to  moderate (1-2+) mitral regurgitation. There is mild mitral stenosis.     Tricuspid Valve  Tricuspid leaflets are thickened. There is mild to moderate (1-2+) tricuspid  regurgitation. There is no tricuspid stenosis. The estimated pulmonary artery  systolic pressure is 56 mmHg.     Aortic Valve  Severe aortic valve calcification is present. There is mild to moderate (1-2+)  aortic regurgitation. Severe valvular aortic stenosis. The calculated aortic  valve are is 0.63 cm^2. The mean AoV pressure gradient is  44mmHg.     Pulmonic Valve  There is mild (1+) pulmonic valvular regurgitation. There is no pulmonic  valvular stenosis.     Vessels  The aorta root is normal. IVC diameter and respiratory changes fall into an  intermediate range suggesting an RA pressure of 8 mmHg.     Pericardium  Small pericardial effusion. There are no echocardiographic indications of  cardiac tamponade.     ______________________________________________________________________________  MMode/2D Measurements & Calculations  IVSd: 1.6 cm  LVIDd: 4.0 cm  LVIDs: 2.8 cm  LVPWd: 1.4 cm  FS: 29.4 %  LV mass(C)d: 229.5 grams  LV mass(C)dI: 166.6 grams/m2  Ao root diam: 3.1 cm  LA dimension: 3.7 cm     asc Aorta Diam: 4.2 cm  LA/Ao: 1.2  LVOT diam: 1.9 cm  LVOT area: 2.8 cm2  RWT: 0.70     Time Measurements  MM HR: 78.0 BPM     Doppler Measurements & Calculations  MV E max casey: 122.0 cm/sec  MV A max casey: 161.8 cm/sec  MV E/A: 0.75  MV max P.1 mmHg  MV mean P.3 mmHg  MV V2 VTI: 36.9 cm  MVA(VTI): 1.9 cm2  MV dec slope: 540.2 cm/sec2  MV dec time: 0.23 sec  Ao V2 max: 400.3 cm/sec  Ao max P.0 mmHg  Ao V2 mean: 304.9 cm/sec  Ao mean P.5 mmHg  Ao V2 VTI: 110.6 cm  GLORIA(I,D): 0.63 cm2  GLORIA(V,D): 0.71 cm2  LV V1 max P.0 mmHg  LV V1 max: 100.3 cm/sec  LV V1 VTI: 24.8 cm  SV(LVOT): 70.2 ml  SI(LVOT): 51.0 ml/m2  PA acc time: 0.12 sec  PI end-d casey: 152.6 cm/sec     TR max casey: 337.8 cm/sec  TR max P.7 mmHg  AV Casey Ratio (DI): 0.25  GLORIA Index (cm2/m2): 0.46  E/E' av.9  Lateral E/e': 26.4  Medial E/e': 37.4     ______________________________________________________________________________  Report approved by: Zoie Trevino 2021 11:41 AM             Dennise Solomon MD  [facility] Medicine Service  [phone]

## 2021-08-07 NOTE — ED PROVIDER NOTES
"ED SIGNOUT  Date/Time:8/7/2021 12:50 AM    Patient signed out to me by my colleague, Dr. Gabe Page.  Please see their note for complete history and physical. Plan to follow up on urinalysis.      The creation of this record is based on the scribe s observations of the work being performed by Loretta Eng MD and the provider s statements to them. It was created on their behalf by Shailesh barry trained medical scribe. This document has been checked and approved by the attending provider.      REMAINING ED WORKUP:    Vitals:  /55   Pulse 78   Temp 98.4  F (36.9  C) (Oral)   Resp 20   Ht 1.59 m (5' 2.6\")   Wt 40.8 kg (90 lb)   SpO2 98%   BMI 16.15 kg/m        Pertinent labs results reviewed   Results for orders placed or performed during the hospital encounter of 08/06/21   XR Chest Port 1 View    Impression    IMPRESSION: Stable cardiomegaly. Interstitial prominence consistent with mild edema. Small bilateral pleural effusions and mild bibasilar atelectasis. Reverse arthroplasty of the left glenohumeral joint.   Basic metabolic panel   Result Value Ref Range    Sodium 135 (L) 136 - 145 mmol/L    Potassium 4.7 3.5 - 5.0 mmol/L    Chloride 107 98 - 107 mmol/L    Carbon Dioxide (CO2) 21 (L) 22 - 31 mmol/L    Anion Gap 7 5 - 18 mmol/L    Urea Nitrogen 21 8 - 28 mg/dL    Creatinine 0.91 0.60 - 1.10 mg/dL    Calcium 10.0 8.5 - 10.5 mg/dL    Glucose 101 70 - 125 mg/dL    GFR Estimate 58 (L) >60 mL/min/1.73m2   Lactic acid whole blood   Result Value Ref Range    Lactic Acid 1.2 0.7 - 2.0 mmol/L   Result Value Ref Range    Magnesium 1.6 (L) 1.8 - 2.6 mg/dL   UA with Microscopic reflex to Culture    Specimen: Urine, Clean Catch   Result Value Ref Range    Color Urine Light Yellow Colorless, Straw, Light Yellow, Yellow    Appearance Urine Clear Clear    Glucose Urine Negative Negative mg/dL    Bilirubin Urine Negative Negative    Ketones Urine Negative Negative mg/dL    Specific Gravity Urine 1.012 1.001 - " 1.030    Blood Urine 0.2 mg/dL (A) Negative    pH Urine 5.0 5.0 - 7.0    Protein Albumin Urine Negative Negative mg/dL    Urobilinogen Urine <2.0 <2.0 mg/dL    Nitrite Urine Negative Negative    Leukocyte Esterase Urine 25 Patsy/uL (A) Negative    Bacteria Urine Few (A) None Seen /HPF    Mucus Urine Present (A) None Seen /LPF    RBC Urine 25 (H) <=2 /HPF    WBC Urine 8 (H) <=5 /HPF   CBC with platelets and differential   Result Value Ref Range    WBC Count 8.7 4.0 - 11.0 10e3/uL    RBC Count 3.03 (L) 3.80 - 5.20 10e6/uL    Hemoglobin 9.0 (L) 11.7 - 15.7 g/dL    Hematocrit 28.2 (L) 35.0 - 47.0 %    MCV 93 78 - 100 fL    MCH 29.7 26.5 - 33.0 pg    MCHC 31.9 31.5 - 36.5 g/dL    RDW 13.6 10.0 - 15.0 %    Platelet Count 110 (L) 150 - 450 10e3/uL   SARS-COV2 (COVID-19) Virus RT-PCR    Specimen: Nasopharyngeal; Swab   Result Value Ref Range    SARS CoV2 PCR Negative Negative   Manual Differential   Result Value Ref Range    % Neutrophils 75 %    % Lymphocytes 16 %    % Monocytes 9 %    % Eosinophils 0 %    % Basophils 0 %    Absolute Neutrophils 6.5 1.6 - 8.3 10e3/uL    Absolute Lymphocytes 1.4 0.8 - 5.3 10e3/uL    Absolute Monocytes 0.8 0.0 - 1.3 10e3/uL    Absolute Eosinophils 0.0 0.0 - 0.7 10e3/uL    Absolute Basophils 0.0 0.0 - 0.2 10e3/uL    RBC Morphology Confirmed RBC Indices     Platelet Assessment  Automated Count Confirmed. Platelet morphology is normal.     Automated Count Confirmed. Platelet morphology is normal.    [place] Cells Slight (A) None Seen    Elliptocytes Slight (A) None Seen   Troponin I (now)   Result Value Ref Range    Troponin I 0.64 (HH) 0.00 - 0.29 ng/mL       Pertinent imaging reviewed   Please see official radiology report.  XR Chest Port 1 View   Final Result   IMPRESSION: Stable cardiomegaly. Interstitial prominence consistent with mild edema. Small bilateral pleural effusions and mild bibasilar atelectasis. Reverse arthroplasty of the left glenohumeral joint.            Interventions  Medications   0.9% sodium chloride BOLUS (1,000 mLs Intravenous New Bag 8/7/21 0006)     Followed by   sodium chloride 0.9% infusion ( Intravenous New Bag 8/7/21 0209)   furosemide (LASIX) injection 20 mg (has no administration in time range)        ED Course/MDM:  12:51 AM Signout accepted from Gabe Page M.D. Plan is to follow up on UA.   2:38 AM I rechecked on the patient. She is requiring 2L nasal cannula to maintain SpO2. She is not on any home oxygen and will desaturate to the mid 80s off of oxygen. She does not have any increased work of breathing and denies dyspnea, chest pain, or pressure. EKG shows sinus rhythm with RBBB, unchanged from previous EKG. Has a chronic troponin leak which is unchanged from her baseline. She did get some IVF here, approximately 600 ml and CXR with some mild interstitial edema. With recent surgery, she may have been slightly volume up anyway. I gave her some Lasix and we'll plan on admitting her for new oxygen requirement. Initially was planning observation admission but with elevated troponin, we'll put her as inpatient cardiac telemetry although her clinical picture is not c/w NSTEMI.        1. Fever, unspecified fever cause    2. Hypoxia      Loretta Eng M.D.   [facility] Emergency Department         Loretta Eng MD  08/07/21 3006

## 2021-08-07 NOTE — ED PROVIDER NOTES
EMERGENCY DEPARTMENT ENCOUNTER      NAME: Shirley Phipps  AGE: 85 year old female  YOB: 1935  MRN: [id number]  EVALUATION DATE & TIME: 8/6/2021 10:00 PM    PCP: Desirae Thomson    ED PROVIDER: Gabe Page M.D.    Chief Complaint   Patient presents with     post op fever       FINAL IMPRESSION:  1. Fever, unspecified fever cause    2. Hypoxia    3. Elevated troponin        ED COURSE & MEDICAL DECISION MAKING:    Pertinent Labs & Imaging studies personally reviewed and interpreted by me. (See chart for details)  10:10 PM Patient seen and examined, prior records reviewed. PPE: Provider wore surgical mask.  Differential diagnosis includes but not limited to strep throat, influenza, pneumonia, urinary tract infection, pyelonephritis, diverticulitis, bacteremia, medication reaction, sepsis, cellulitis.  Patient reports fever at home, afebrile here but did take ibuprofen prior to coming in.  History of sepsis and she is quite concerned about this, no tachycardia or hypotension.  Bladder seems distended and patient says she has been having some loss of urine, concern for possible urinary tract infection and overflow.  No saddle anesthesia.  Bladder scan is ordered and if retaining, will plan to catheter.  Given recent lumbar compression fracture, concern for possible cauda equina syndrome although patient has no increase in back pain, saddle anesthesia, pain radiating to the legs.  12:58 AM patient is not yet given a urine sample, lactate is normal and white blood cell count is normal, sepsis is unlikely.  Signout to oncoming provider for follow-up of urinalysis.       At the conclusion of the encounter I discussed the results of all of the tests and the disposition. The questions were answered. The patient or family acknowledged understanding and was agreeable with the care plan.     PROCEDURES:   Procedures    MEDICATIONS GIVEN IN THE EMERGENCY:  Medications   0.9% sodium chloride BOLUS (0 mLs  Intravenous Stopped 8/7/21 0200)   furosemide (LASIX) injection 20 mg (20 mg Intravenous Given 8/7/21 0337)   magnesium sulfate 2 g in water intermittent infusion (2 g Intravenous New Bag 8/7/21 5355)       NEW PRESCRIPTIONS STARTED AT TODAY'S ER VISIT  Discharge Medication List as of 8/8/2021  2:26 PM      START taking these medications    Details   furosemide (LASIX) 20 MG tablet Take 1 tablet (20 mg) by mouth daily, Disp-30 tablet, R-0, E-Prescribe             =================================================================    HPI    Patient information was obtained from: Patient       Shirley Phipps is a 85 year old female with a pertinent history of hypertension, aortic stenosis, hyperlipidemia, anemia, nephrolithiasis, pyelonephritis, UTI, and chronic pain syndrome who presents to this ED via EMS for evaluation of fever.     Per chart review, patient had cystoureteroscopy and right retrograde pyelogram with Dr. Liu on 8/4/2021. Patient had managed to pass her stone spontaneously since last imaging.      Patient states that she had a kidney stone for 5 months and she had surgery with Dr. Liu to remove the stone 2 days ago. During surgery they found that the stone had already passed; she reports that no stents were placed. Today the patient woke up and felt warm, so she took her temperature and found that she had a fever of 102 F. She is concerned about sepsis. She took ibuprofen at home prior to arrival. Patient also endorses constipation and mild abdominal distention. She notes that she has been leaking urine today.     Additionally, patient reports ongoing back pain as well as right knee pain from a prior twisting injury. She denies vomiting, diarrhea, cough, dysuria, or additional symptoms at this time.             REVIEW OF SYSTEMS   Review of Systems   Constitutional: Positive for fever.   Respiratory: Negative for cough.    Gastrointestinal: Positive for abdominal distention and constipation.  Negative for diarrhea and vomiting.   Genitourinary: Negative for dysuria.        +Urinary incontinence   Musculoskeletal: Positive for arthralgias (right knee pain) and back pain.      All other systems reviewed and negative    PAST MEDICAL HISTORY:  Past Medical History:   Diagnosis Date     Anemia      Aortic stenosis      Arthritis      Bicuspid aortic valve      Bladder infection      Cancer (H)     SQUAMOUS 2X     Closed burst fracture of lumbar vertebra (H) 03/26/2019    L4 BURST FX WITH RETROPULSION     Dyslipidemia      Fall 03/26/2019     GERD (gastroesophageal reflux disease)      History of transfusion     SHE DOESN'T THINK SHE HAD A TRANSFUSION REACTION     Hypertension        PAST SURGICAL HISTORY:  Past Surgical History:   Procedure Laterality Date     C RECONSTR TOTAL SHOULDER IMPLANT Left 12/26/2016    Procedure: LEFT REVERSE TOTAL SHOULDER ARTHROPLASTY;  Surgeon: Maurilio Marcelino MD;  Location: [facility];  Service: Orthopedics     COMBINED CYSTOSCOPY, INSERT STENT URETER(S) Right 8/4/2021    Procedure: CYSTOURETEROSCOPY, RETROGRADE PYLEOGRAM RIGHT ;  Surgeon: Marbin Liu MD;  Location: [facility]     OTHER SURGICAL HISTORY Right     surgery for prior wrist fracture     OTHER SURGICAL HISTORY Right     surgery for prior knee fracture     PIC  12/25/2016            CURRENT MEDICATIONS:    No current facility-administered medications for this encounter.     Current Outpatient Medications   Medication     diclofenac (VOLTAREN) 1 % topical gel     ferrous sulfate 325 (65 FE) MG tablet     furosemide (LASIX) 20 MG tablet     gabapentin (NEURONTIN) 300 MG capsule     lidocaine 4 % patch     morphine (MSIR) 15 MG IR tablet     prednisoLONE acetate (PRED FORTE) 1 % ophthalmic suspension     senna-docusate (SENNOSIDES-DOCUSATE SODIUM) 8.6-50 mg tablet       ALLERGIES:  Allergies   Allergen Reactions     Cefdinir Hives     Clindamycin Nausea     C.Diff     Codeine Nausea and Vomiting      Tolerates oral morphine.     Flagyl [Metronidazole] Angioedema     Sulfa (Sulfonamide Antibiotics) [Sulfa Drugs] Nausea     Canker sores     Tramadol Unknown     Upset stomach       FAMILY HISTORY:  Family History   Problem Relation Age of Onset     Diabetes Type 2  Other      Breast Cancer Mother      Diabetes Father      Diabetes Brother        SOCIAL HISTORY:   Social History     Socioeconomic History     Marital status:      Spouse name: Not on file     Number of children: Not on file     Years of education: Not on file     Highest education level: Not on file   Occupational History     Not on file   Tobacco Use     Smoking status: Never Smoker     Smokeless tobacco: Never Used   Substance and Sexual Activity     Alcohol use: Yes     Alcohol/week: 7.0 standard drinks     Comment: Alcoholic Drinks/day: 1 shot of bee daily     Drug use: No     Sexual activity: Not on file   Other Topics Concern     Parent/sibling w/ CABG, MI or angioplasty before 65F 55M? Not Asked   Social History Narrative     Not on file     Social Determinants of Health     Financial Resource Strain:      Difficulty of Paying Living Expenses:    Food Insecurity:      Worried About Running Out of Food in the Last Year:      Ran Out of Food in the Last Year:    Transportation Needs:      Lack of Transportation (Medical):      Lack of Transportation (Non-Medical):    Physical Activity:      Days of Exercise per Week:      Minutes of Exercise per Session:    Stress:      Feeling of Stress :    Social Connections:      Frequency of Communication with Friends and Family:      Frequency of Social Gatherings with Friends and Family:      Attends Alevism Services:      Active Member of Clubs or Organizations:      Attends Club or Organization Meetings:      Marital Status:    Intimate Partner Violence:      Fear of Current or Ex-Partner:      Emotionally Abused:      Physically Abused:      Sexually Abused:        VITALS:  /53    "Pulse 70   Temp 98  F (36.7  C) (Oral)   Resp 18   Ht 1.59 m (5' 2.6\")   Wt 43.6 kg (96 lb 1.6 oz)   SpO2 97%   BMI 17.24 kg/m      PHYSICAL EXAM:  Physical Exam  Vitals and nursing note reviewed.   Constitutional:       Appearance: Normal appearance.   HENT:      Head: Normocephalic and atraumatic.      Right Ear: External ear normal.      Left Ear: External ear normal.      Nose: Nose normal.      Mouth/Throat:      Mouth: Mucous membranes are moist.   Eyes:      Extraocular Movements: Extraocular movements intact.      Conjunctiva/sclera: Conjunctivae normal.      Pupils: Pupils are equal, round, and reactive to light.   Cardiovascular:      Rate and Rhythm: Normal rate and regular rhythm.      Heart sounds: Murmur heard.   Systolic murmur is present with a grade of 5/6.     Pulmonary:      Effort: Pulmonary effort is normal.      Breath sounds: Normal breath sounds. No wheezing or rales.   Abdominal:      General: Abdomen is flat. There is distension (with palpable bladder at the umbilicus).      Palpations: Abdomen is soft.      Tenderness: There is no abdominal tenderness. There is no guarding.   Musculoskeletal:         General: Normal range of motion.      Cervical back: Normal range of motion and neck supple.      Right lower leg: No edema.      Left lower leg: No edema.      Comments: TLSO brace in place   Lymphadenopathy:      Cervical: No cervical adenopathy.   Skin:     General: Skin is warm and dry.   Neurological:      General: No focal deficit present.      Mental Status: She is alert and oriented to person, place, and time. Mental status is at baseline.      Comments: No gross focal neurologic deficits   Psychiatric:         Mood and Affect: Mood normal.         Behavior: Behavior normal.         Thought Content: Thought content normal.         LAB:  All pertinent labs reviewed and interpreted.  Results for orders placed or performed during the hospital encounter of 08/06/21   XR Chest Port 1 " View    Impression    IMPRESSION: Stable cardiomegaly. Interstitial prominence consistent with mild edema. Small bilateral pleural effusions and mild bibasilar atelectasis. Reverse arthroplasty of the left glenohumeral joint.   Basic metabolic panel   Result Value Ref Range    Sodium 135 (L) 136 - 145 mmol/L    Potassium 4.7 3.5 - 5.0 mmol/L    Chloride 107 98 - 107 mmol/L    Carbon Dioxide (CO2) 21 (L) 22 - 31 mmol/L    Anion Gap 7 5 - 18 mmol/L    Urea Nitrogen 21 8 - 28 mg/dL    Creatinine 0.91 0.60 - 1.10 mg/dL    Calcium 10.0 8.5 - 10.5 mg/dL    Glucose 101 70 - 125 mg/dL    GFR Estimate 58 (L) >60 mL/min/1.73m2   Lactic acid whole blood   Result Value Ref Range    Lactic Acid 1.2 0.7 - 2.0 mmol/L   Result Value Ref Range    Magnesium 1.6 (L) 1.8 - 2.6 mg/dL   UA with Microscopic reflex to Culture    Specimen: Urine, Clean Catch   Result Value Ref Range    Color Urine Light Yellow Colorless, Straw, Light Yellow, Yellow    Appearance Urine Clear Clear    Glucose Urine Negative Negative mg/dL    Bilirubin Urine Negative Negative    Ketones Urine Negative Negative mg/dL    Specific Gravity Urine 1.012 1.001 - 1.030    Blood Urine 0.2 mg/dL (A) Negative    pH Urine 5.0 5.0 - 7.0    Protein Albumin Urine Negative Negative mg/dL    Urobilinogen Urine <2.0 <2.0 mg/dL    Nitrite Urine Negative Negative    Leukocyte Esterase Urine 25 Patsy/uL (A) Negative    Bacteria Urine Few (A) None Seen /HPF    Mucus Urine Present (A) None Seen /LPF    RBC Urine 25 (H) <=2 /HPF    WBC Urine 8 (H) <=5 /HPF   CBC with platelets and differential   Result Value Ref Range    WBC Count 8.7 4.0 - 11.0 10e3/uL    RBC Count 3.03 (L) 3.80 - 5.20 10e6/uL    Hemoglobin 9.0 (L) 11.7 - 15.7 g/dL    Hematocrit 28.2 (L) 35.0 - 47.0 %    MCV 93 78 - 100 fL    MCH 29.7 26.5 - 33.0 pg    MCHC 31.9 31.5 - 36.5 g/dL    RDW 13.6 10.0 - 15.0 %    Platelet Count 110 (L) 150 - 450 10e3/uL   SARS-COV2 (COVID-19) Virus RT-PCR    Specimen: Nasopharyngeal; Swab    Result Value Ref Range    SARS CoV2 PCR Negative Negative   Manual Differential   Result Value Ref Range    % Neutrophils 75 %    % Lymphocytes 16 %    % Monocytes 9 %    % Eosinophils 0 %    % Basophils 0 %    Absolute Neutrophils 6.5 1.6 - 8.3 10e3/uL    Absolute Lymphocytes 1.4 0.8 - 5.3 10e3/uL    Absolute Monocytes 0.8 0.0 - 1.3 10e3/uL    Absolute Eosinophils 0.0 0.0 - 0.7 10e3/uL    Absolute Basophils 0.0 0.0 - 0.2 10e3/uL    RBC Morphology Confirmed RBC Indices     Platelet Assessment  Automated Count Confirmed. Platelet morphology is normal.     Automated Count Confirmed. Platelet morphology is normal.    [place] Cells Slight (A) None Seen    Elliptocytes Slight (A) None Seen   Troponin I (now)   Result Value Ref Range    Troponin I 0.64 (HH) 0.00 - 0.29 ng/mL   B-Type Natriuretic Peptide ([facility] Only)   Result Value Ref Range    BNP 2,248 (H) 0 - 167 pg/mL   Result Value Ref Range    Troponin I 0.58 (HH) 0.00 - 0.29 ng/mL   Comprehensive metabolic panel   Result Value Ref Range    Sodium 137 136 - 145 mmol/L    Potassium 4.2 3.5 - 5.0 mmol/L    Chloride 108 (H) 98 - 107 mmol/L    Carbon Dioxide (CO2) 20 (L) 22 - 31 mmol/L    Anion Gap 9 5 - 18 mmol/L    Urea Nitrogen 19 8 - 28 mg/dL    Creatinine 0.82 0.60 - 1.10 mg/dL    Calcium 9.0 8.5 - 10.5 mg/dL    Glucose 95 70 - 125 mg/dL    Alkaline Phosphatase 67 45 - 120 U/L    AST 13 0 - 40 U/L    ALT <9 0 - 45 U/L    Protein Total 6.0 6.0 - 8.0 g/dL    Albumin 3.4 (L) 3.5 - 5.0 g/dL    Bilirubin Total 0.8 0.0 - 1.0 mg/dL    GFR Estimate 65 >60 mL/min/1.73m2   Result Value Ref Range    Magnesium 1.6 (L) 1.8 - 2.6 mg/dL   Basic metabolic panel   Result Value Ref Range    Sodium 139 136 - 145 mmol/L    Potassium 3.7 3.5 - 5.0 mmol/L    Chloride 108 (H) 98 - 107 mmol/L    Carbon Dioxide (CO2) 24 22 - 31 mmol/L    Anion Gap 7 5 - 18 mmol/L    Urea Nitrogen 15 8 - 28 mg/dL    Creatinine 0.76 0.60 - 1.10 mg/dL    Calcium 8.5 8.5 - 10.5 mg/dL    Glucose 90 70 - 125  mg/dL    GFR Estimate 72 >60 mL/min/1.73m2   CBC with platelets   Result Value Ref Range    WBC Count 4.5 4.0 - 11.0 10e3/uL    RBC Count 2.82 (L) 3.80 - 5.20 10e6/uL    Hemoglobin 8.0 (L) 11.7 - 15.7 g/dL    Hematocrit 25.8 (L) 35.0 - 47.0 %    MCV 92 78 - 100 fL    MCH 28.4 26.5 - 33.0 pg    MCHC 31.0 (L) 31.5 - 36.5 g/dL    RDW 13.5 10.0 - 15.0 %    Platelet Count 111 (L) 150 - 450 10e3/uL   Result Value Ref Range    Magnesium 1.9 1.8 - 2.6 mg/dL   Echocardiogram Complete   Result Value Ref Range    LVEF  60-65%    Blood Culture Peripheral Blood    Specimen: Peripheral Blood   Result Value Ref Range    Culture No growth after 1 day    Blood Culture Peripheral Blood    Specimen: Peripheral Blood   Result Value Ref Range    Culture No growth after 1 day    Urine Culture    Specimen: Urine, Clean Catch   Result Value Ref Range    Culture Mixed Urogenital Fabi        RADIOLOGY:  Reviewed all pertinent imaging. Please see official radiology report.  Echocardiogram Complete   Final Result      XR Chest Port 1 View   Final Result   IMPRESSION: Stable cardiomegaly. Interstitial prominence consistent with mild edema. Small bilateral pleural effusions and mild bibasilar atelectasis. Reverse arthroplasty of the left glenohumeral joint.          I have independently reviewed and interpreted the EKG(s) documented above.    I, Neda Sanchez, am serving as a scribe to document services personally performed by Dr. Page based on my observation and the provider's statements to me. I, Gabe Page MD attest that Neda Sanchez is acting in a scribe capacity, has observed my performance of the services and has documented them in accordance with my direction.    Gabe Page M.D.  Emergency Medicine  [facility] EMERGENCY DEPARTMENT  [address] [place] [address] [phone]  Dept: [phone]     Gabe Page MD  08/09/21 1012

## 2021-08-07 NOTE — ED NOTES
Bed: JNED-06  Expected date: 8/6/21  Expected time: 9:55 PM  Means of arrival: Ambulance  Comments:  84yo F  Fever post kidney stone removal

## 2021-08-07 NOTE — ED TRIAGE NOTES
Pt arrived via [facility] EMS for evaluation of post op fever. EMS stated patient had surgery 2 days ago to remove kidney stone. Had temp of 102 for one day. EMS reports stable VS. Patient took 2 tablets of ibuprofen at aprx 2000 hours.

## 2021-08-07 NOTE — CONSULTS
"Care Management Initial Consult    General Information  Assessment completed with: Patient, Shirley  Type of CM/SW Visit: Initial Assessment    Primary Care Provider verified and updated as needed: Yes   Readmission within the last 30 days: no previous admission in last 30 days      Reason for Consult: discharge planning  Advance Care Planning: Advance Care Planning Reviewed: other (comment) (\"don't have one\")          Communication Assessment  Patient's communication style: spoken language (English or Bilingual)             Cognitive  Cognitive/Neuro/Behavioral: WDL                      Living Environment:   People in home: child(rabia), adult (\"my son from [place] is staying at my house\")     Current living Arrangements: house (\"twin house\")      Able to return to prior arrangements: yes       Family/Social Support:  Care provided by: self  Provides care for: no one, unable/limited ability to care for self  Marital Status:   Children          Description of Support System: Supportive, Involved    Support Assessment: Adequate family and caregiver support, Adequate social supports, Patient communicates needs well met    Current Resources:   Patient receiving home care services: No     Community Resources: None  Equipment currently used at home: orthosis, walker, rolling (\"2 wheeled walker; TLSO brace\")  Supplies currently used at home: Incontinence Supplies, Other (\"glasses\")    Employment/Financial:  Employment Status: retired        Financial Concerns:     Referral to Financial Counselor: No       Lifestyle & Psychosocial Needs:  Social Determinants of Health     Tobacco Use: Low Risk      Smoking Tobacco Use: Never Smoker     Smokeless Tobacco Use: Never Used   Alcohol Use:      Frequency of Alcohol Consumption:      Average Number of Drinks:      Frequency of Binge Drinking:    Financial Resource Strain:      Difficulty of Paying Living Expenses:    Food Insecurity:      Worried About Running Out of Food in " "the Last Year:      Ran Out of Food in the Last Year:    Transportation Needs:      Lack of Transportation (Medical):      Lack of Transportation (Non-Medical):    Physical Activity:      Days of Exercise per Week:      Minutes of Exercise per Session:    Stress:      Feeling of Stress :    Social Connections:      Frequency of Communication with Friends and Family:      Frequency of Social Gatherings with Friends and Family:      Attends [employer] Services:      Active Member of Clubs or Organizations:      Attends Club or Organization Meetings:      Marital Status:    Intimate Partner Violence:      Fear of Current or Ex-Partner:      Emotionally Abused:      Physically Abused:      Sexually Abused:    Depression:      PHQ-2 Score:    Housing Stability:      Unable to Pay for Housing in the Last Year:      Number of Places Lived in the Last Year:      Unstable Housing in the Last Year:        Functional Status:  Prior to admission patient needed assistance:   Dependent ADLs:: Ambulation-walker, Incontinence, Positioning (\"need help with the TLSO brace\")  Dependent IADLs:: Cleaning, Laundry, Shopping, Transportation, Incontinence (\"sons help\")       Mental Health Status:          Chemical Dependency Status:                Values/Beliefs:  Spiritual, Cultural Beliefs, Baptist Practices, Values that affect care:            Values/Beliefs Comment: \"Tenriism christopher\"    Additional Information:  Shirley lives in a twin house with her adult son. Her son is staying with her from [place]. She also has another son who lives in the [facility]. Both sons help with transportation and meal prep, shopping, housekeeping, and laundry. She has also used grocery delivery in the past. She is mostly independent with ADLs, son helping with mobility issues and with the TLSO brace placement.    May need Home Care help at discharge.    Son to transport at discharge.    Loretta Real RN      "

## 2021-08-07 NOTE — ED NOTES
Pt assisted to commode.  Pt able to void.  Pt taken off O2 to see how pt would do since pt does not wear O2 at home.  Pt assisted back into bed and placed back on monitor.  O2 on RA 84%  Pt placed back on O2 2L per MD.

## 2021-08-07 NOTE — ED NOTES
Tech into pts room to get 2nd set of blood cultures. Pts monitor alarming and pts O2 was at 84%RA.  Pt placed on 2L of O2 per RN and MD.

## 2021-08-07 NOTE — TELEPHONE ENCOUNTER
Patient calling, was scheduled to have kidney stone removed but found out that it has passed. Patient has a temp of 100 oral.   No pain reported. Patient reports constipation.  No signs of infections. Denies burning with urination. Patient has previously had sepsis and is nervous temperature will keep rising.   Protocol recommends home care. Patient verbalizes understanding and will call back if worsening symptoms.     Ginny Guo RN   08/06/21 8:44 PM  [facility] Nurse Advisor    COVID 19 Nurse Triage Plan/Patient Instructions    Please be aware that novel coronavirus (COVID-19) may be circulating in the community. If you develop symptoms such as fever, cough, or SOB or if you have concerns about the presence of another infection including coronavirus (COVID-19), please contact your health care provider or visit [email or web address].     Disposition/Instructions    Home care recommended. Follow home care protocol based instructions.    Thank you for taking steps to prevent the spread of this virus.  o Limit your contact with others.  o Wear a simple mask to cover your cough.  o Wash your hands well and often.    Resources    [facility]: About COVID-19: [email or web address]    CDC: What to Do If You're Sick: www.cdc.gov/coronavirus/2019-ncov/about/steps-when-sick.html    CDC: Ending Home Isolation: www.cdc.gov/coronavirus/2019-ncov/hcp/disposition-in-home-patients.html     CDC: Caring for Someone: www.cdc.gov/coronavirus/2019-ncov/if-you-are-sick/care-for-someone.html     [facility]: Interim Guidance for Hospital Discharge to Home: [email or web address]/diseases/coronavirus/hcp/hospdischarge.pdf    [facility] clinical trials (COVID-19 research studies): [email or web address]     Below are the COVID-19 hotlines at the [facility] ([facility]). Interpreters are available.   o For health questions: Call 544-754-4251 or 1-631.358.9029 (7 a.m. to  7 p.m.)  o For questions about schools and childcare: Call 478-510-4863 or 1-193.460.3457 (7 a.m. to 7 p.m.)     Reason for Disposition    Other post-op symptom or question    Additional Information    Negative: Sounds like a life-threatening emergency to the triager    Negative: Chest pain    Negative: Difficulty breathing    Negative: Acting confused (e.g., disoriented, slurred speech) or excessively sleepy    Negative: Surgical incision symptoms and questions    Negative: [1] Discomfort (pain, burning or stinging) when passing urine AND [2] male    Negative: [1] Discomfort (pain, burning or stinging) when passing urine AND [2] female    Negative: Constipation    Negative: New or worsening leg (calf, thigh) pain    Negative: New or worsening leg swelling    Negative: Dizziness is severe, or persists > 24 hours after surgery    Negative: Pain, redness, swelling, or pus at IV Site    Negative: Symptoms arising from use of a urinary catheter (Andres or Coude)    Negative: Cast problems or questions    Negative: Medication question    Negative: [1] Widespread rash AND [2] bright red, sunburn-like    Negative: [1] SEVERE headache AND [2] after spinal (epidural) anesthesia    Negative: [1] Vomiting AND [2] persists > 4 hours    Negative: [1] Vomiting AND [2] abdomen looks much more swollen than usual    Negative: [1] Drinking very little AND [2] dehydration suspected (e.g., no urine > 12 hours, very dry mouth, very lightheaded)    Negative: Patient sounds very sick or weak to the triager    Negative: Sounds like a serious complication to the triager    Negative: Fever > 100.4 F (38.0 C)    Negative: [1] SEVERE post-op pain (e.g., excruciating, pain scale 8-10) AND [2] not controlled with pain medications    Negative: [1] Caller has URGENT question AND [2] triager unable to answer question    Negative: [1] Headache AND [2] after spinal (epidural) anesthesia AND [3] not severe    Negative: Fever present > 3 days (72  hours)    Negative: [1] MILD-MODERATE post-op pain (e.g., pain scale 1-7) AND [2] not controlled with pain medications    Negative: [1] Caller has NON-URGENT question AND [2] triager unable to answer question    Negative: [1] Vomiting AND [2] present < 4 hours    Negative: Throat pain after surgery, questions about    Negative: Getting the incision wet, questions about    Negative: Resuming sexual relations, questions about    Negative: Resuming driving, questions about    Negative: General activity, questions about    Protocols used: POST-OP SYMPTOMS AND [id number]-N-DC

## 2021-08-07 NOTE — CONSULTS
Cardiology Consult Note    Thank you, Dr. Nura Bautista, for asking the [facility] team to see Shirley Phipps in consultation at [facility] emergency department to evaluate elevated BNP and troponin.      Assessment:   1.  Elevated BNP and troponin, likely precipitated by stress of recent surgery in the setting of known severe aortic valve stenosis which has not been assessed for the past 6 years.  She denies any complaints of chest discomfort, shortness of breath or orthopnea.  Can attempt gentle diuresis as long as blood pressure allows.  2.  Severe aortic valve stenosis, last documented in 2015.  Patient has had no subsequent evaluation of her aortic valve as she elected at that time not to pursue any intervention.  Echocardiogram reveals moderate to severe concentric left ventricular hypertrophy with normal systolic function.  There is evidence of elevated left ventricular filling pressures.  Severe aortic valve stenosis is again noted with mean gradient of 44 mmHg.  Again briefly discussed option of possible TAVR as a means of treating her aortic valve disease.  Again she does not appear to be interested in pursuing this.  She understands this could ultimately lead to her death.  3.  History of nephrolithiasis with recent surgery revealing that she had passed her stone.  4.  Anemia of chronic disease     Plan:   1.  Attempt gentle diuresis but avoid hypotension  2.  No further recommendations at this time as patient does not appear to be interested in more aggressive treatment       Current History:   Ms. Shirley Phipps is a 85 year old female with history of severe aortic valve stenosis last documented in 2015 who refused intervention at that time, chronic anemia, history of severe osteoporosis and nephrolithiasis who recently underwent surgery to remove a stone.  She was actually found to have already passed the stone.  No stenting was performed.  Over the last 48 hours, she has  felt warm and subsequently checked her temperature and found it to be elevated.  She presented here for possible urinary tract infection.  Multiple labs were obtained and she was found to have an elevated BNP as well as a slightly elevated troponin.  Cardiac consult requested.  She denies any complaints of chest discomfort, shortness of breath, orthopnea, PND or lower extremity edema.  No lightheadedness or near syncope.    Past Medical History:     Past Medical History:   Diagnosis Date     Anemia      Aortic stenosis      Arthritis      Bicuspid aortic valve      Bladder infection      Cancer (H)     SQUAMOUS 2X     Closed burst fracture of lumbar vertebra (H) 03/26/2019    L4 BURST FX WITH RETROPULSION     Dyslipidemia      Fall 03/26/2019     GERD (gastroesophageal reflux disease)      History of transfusion     SHE DOESN'T THINK SHE HAD A TRANSFUSION REACTION     Hypertension        Past Surgical History:     Past Surgical History:   Procedure Laterality Date     C RECONSTR TOTAL SHOULDER IMPLANT Left 12/26/2016    Procedure: LEFT REVERSE TOTAL SHOULDER ARTHROPLASTY;  Surgeon: Maurilio Marcelino MD;  Location: [facility];  Service: Orthopedics     COMBINED CYSTOSCOPY, INSERT STENT URETER(S) Right 8/4/2021    Procedure: CYSTOURETEROSCOPY, RETROGRADE PYLEOGRAM RIGHT ;  Surgeon: Marbin Liu MD;  [place] [facility]     OTHER SURGICAL HISTORY Right     surgery for prior wrist fracture     OTHER SURGICAL HISTORY Right     surgery for prior knee fracture     [facility]  12/25/2016            Family History:     Family History   Problem Relation Age of Onset     Diabetes Type 2  Other      Breast Cancer Mother      Diabetes Father      Diabetes Brother        Social History:    reports that she has never smoked. She has never used smokeless tobacco. She reports current alcohol use of about 7.0 standard drinks of alcohol per week. She reports that she does not use drugs.    Meds:     Current  Facility-Administered Medications:      acetaminophen (TYLENOL) tablet 650 mg, 650 mg, Oral, Q6H PRN **OR** acetaminophen (TYLENOL) Suppository 650 mg, 650 mg, Rectal, Q6H PRN, Dennise Solomon MD     bisacodyl (DULCOLAX) Suppository 10 mg, 10 mg, Rectal, Daily PRN, Dennise Solomon MD     calcium carbonate (TUMS) chewable tablet 1,000 mg, 1,000 mg, Oral, 4x Daily PRN, Dennise Solomon MD     diclofenac (VOLTAREN) 1 % topical gel 2-4 g, 2-4 g, Topical, TID PRN, Dennise Solomon MD     famotidine (PEPCID) tablet 20 mg, 20 mg, Oral, Daily PRN, Fannie Bernardo MD     furosemide (LASIX) injection 20 mg, 20 mg, Intravenous, Q12H, Dennise Solomon MD     gabapentin (NEURONTIN) capsule 300 mg, 300 mg, Oral, TID, Dennise Solomon MD, 300 mg at 08/07/21 0838     heparin ANTICOAGULANT injection 5,000 Units, 5,000 Units, Subcutaneous, Q12H, Dennise Solomon MD, 5,000 Units at 08/07/21 0825     ibuprofen (ADVIL/MOTRIN) tablet 200 mg, 200 mg, Oral, Q6H PRN, Dennise Solomon MD, 200 mg at 08/07/21 0838     Lidocaine (LIDOCARE) 4 % Patch 1 patch, 1 patch, Transdermal, Daily PRN, Dennise Solomon MD, 1 patch at 08/07/21 0838     lidocaine (LMX4) cream, , Topical, Q1H PRN, Dennise Solomon MD     lidocaine 1 % 0.1-1 mL, 0.1-1 mL, Other, Q1H PRN, Dennise Solomon MD     magnesium hydroxide (MILK OF MAGNESIA) suspension 30 mL, 30 mL, Oral, Daily PRN, Dennise Solomon MD     melatonin tablet 1 mg, 1 mg, Oral, At Bedtime PRN, Dennise Solomon MD     morphine (MSIR) IR half-tab 7.5-15 mg, 7.5-15 mg, Oral, Q6H PRN, Dennise Solomon MD     ondansetron (ZOFRAN-ODT) ODT tab 4 mg, 4 mg, Oral, Q6H PRN **OR** ondansetron (ZOFRAN) injection 4 mg, 4 mg, Intravenous, Q6H PRN, Dennise Solomon MD     polyethylene glycol (MIRALAX) Packet 17 g, 17 g, Oral, Daily PRN, Dennise Solomon MD     prednisoLONE acetate (PRED FORTE) 1 % ophthalmic susp 1 drop, 1 drop, Right Eye, BID, Dennise Solomon MD, 1  drop at 08/07/21 1028     sodium chloride (PF) 0.9% PF flush 3 mL, 3 mL, Intracatheter, Q8H, Dennise Solomon MD, 3 mL at 08/07/21 0828     sodium chloride (PF) 0.9% PF flush 3 mL, 3 mL, Intracatheter, q1 min prn, Dennise Solomon MD    Current Outpatient Medications:      diclofenac (VOLTAREN) 1 % topical gel, Apply 2-4 g topically 3 times daily as needed for moderate pain Apply to knees and back, Disp: , Rfl:      ferrous sulfate 325 (65 FE) MG tablet, [FERROUS SULFATE 325 (65 FE) MG TABLET] Take 1 tablet (325 mg total) by mouth daily with breakfast., Disp: 90 tablet, Rfl: 0     gabapentin (NEURONTIN) 300 MG capsule, Take 300 mg by mouth 3 times daily, Disp: , Rfl:      ibuprofen (ADVIL,MOTRIN) 200 MG tablet, [IBUPROFEN (ADVIL,MOTRIN) 200 MG TABLET] Take 2 tablets (400 mg total) by mouth daily as needed for pain. (Patient taking differently: Take 400 mg by mouth 3 times daily as needed for pain ), Disp: , Rfl: 0     lidocaine 4 % patch, Place 1 patch onto the skin daily as needed Apply to knee, Disp: , Rfl:      morphine (MSIR) 15 MG IR tablet, Take 0.5-1 tablet every 4-6 hours as needed for breakthrough pain, Disp: 12 tablet, Rfl: 0     prednisoLONE acetate (PRED FORTE) 1 % ophthalmic suspension, Place 1 drop into the right eye 2 times daily, Disp: , Rfl:      senna-docusate (SENNOSIDES-DOCUSATE SODIUM) 8.6-50 mg tablet, Take 1 tablet by mouth 2 times daily as needed for constipation , Disp: , Rfl:     furosemide  20 mg Intravenous Q12H     gabapentin  300 mg Oral TID     heparin ANTICOAGULANT  5,000 Units Subcutaneous Q12H     prednisoLONE acetate  1 drop Right Eye BID     sodium chloride (PF)  3 mL Intracatheter Q8H       Allergies:   Cefdinir, Clindamycin, Codeine, Flagyl [metronidazole], Sulfa (sulfonamide antibiotics) [sulfa drugs], and Tramadol    Review of Systems:   A 12 point comprehensive review of systems was pertinent for weight loss over the last 1 to 2 years, severe back pain for which she  "wears a brace.    Objective:      Physical Exam  [unfilled]  [unfilled]  Body mass index is 16.15 kg/m .  /60   Pulse 79   Temp 98.4  F (36.9  C) (Oral)   Resp 28   Ht 1.59 m (5' 2.6\")   Wt 40.8 kg (90 lb)   SpO2 90%   BMI 16.15 kg/m      General Appearance:    Well developed, thin elderly female who appears in no acute distress.   HEENT:   Normocephalic, atraumatic.  Sclera nonicteric.  Mucous membranes moist.   Neck:  No jugular venous distention at 60 degrees.  No carotid bruits.  Bilateral transmitted murmur heard   Chest:  Symmetric with normal AP diameter   Lungs:    Clear to auscultation in the mid and upper lung fields.  Few crackles at the bases   Cardiovascular:    Regular rate and rhythm.  S1 normal, S2 reduced.  2-3 over 6 late peaking crescendo decrescendo murmur heard throughout the precordium.  No diastolic murmur.   Abdomen:   Soft, nondistended, nontender.  No organomegaly or mass.   Extremities:  No peripheral edema, clubbing or cyanosis.  Distal pulses symmetric   Skin:  No rash or lesions   Neurologic:  Alert and oriented x3.  No gross focal deficits.             Cardiographics (personally reviewed)  ECG demonstrates normal sinus rhythm, right bundle branch block pattern.  No significant change from prior ECG.    Imaging (personally reviewed)  Echocardiogram Complete 2021    Narrative  [id number]  [id number]  [id number]  [id number]^MARIANELA^CORRINA^R    Impression  Version 2    [place] [address]    Name: TEO KO  MRN: [id number]  : 1935  Study Date: 2021 09:15 AM  Age: 85 yrs  Gender: Female  Patient Location: [facility]  Reason For Study: CHF, Aortic Valve Disorder  Ordering Physician: CORRINA BEAR  Performed By: RORY    BSA: 1.4 m2  Height: 63 in  Weight: 90 lb  HR: " 83  ______________________________________________________________________________  ______________________________________________________________________________  Interpretation Summary    Left ventricular size, wall motion and function are normal. The ejection  fraction is 60-65%. There is moderate to severe concentric left ventricular  hypertrophy. Diastolic Doppler findings (E/E' ratio and/or other parameters)  suggest left ventricular filling pressures are increased.  Normal right ventricle size and systolic function.  The left atrium is moderately dilated.  Severe valvular aortic stenosis.  The mean AoV pressure gradient is 44mmHg.  There is mild mitral stenosis.  There is mild to moderate (1-2+) mitral regurgitation.  There is mild to moderate (1-2+) tricuspid regurgitation.  The estimated pulmonary artery systolic pressure is 56 mmHg.  Small pericardial effusion  There are no echocardiographic indications of cardiac tamponade.  No previous study for comparison.    ______________________________________________________________________________  I      WMSI = 1.00     % Normal = 100    X - Cannot   0 -                      (2) - Mildly 2 -          Segments  Size  Interpret    Hyperkinetic 1 - Normal  Hypokinetic  Hypokinetic  1-2     small  7 -          3-5  moderate  3 - Akinetic 4 -          5 -         6 - Akinetic Dyskinetic   6-14    large  Dyskinetic   Aneurysmal  w/scar       w/scar       15-16   diffuse    Left Ventricle  Left ventricular size, wall motion and function are normal. The ejection  fraction is 60-65%. There is moderate to severe concentric left ventricular  hypertrophy. Diastolic Doppler findings (E/E' ratio and/or other parameters)  suggest left ventricular filling pressures are increased.    Right Ventricle  Normal right ventricle size and systolic function.    Atria  The left atrium is moderately dilated. The right atrium is mildly dilated.  There is no atrial shunt seen.    Mitral  Valve  The mitral valve leaflets appear thickened, but open well. There is mild to  moderate (1-2+) mitral regurgitation. There is mild mitral stenosis.    Tricuspid Valve  Tricuspid leaflets are thickened. There is mild to moderate (1-2+) tricuspid  regurgitation. There is no tricuspid stenosis. The estimated pulmonary artery  systolic pressure is 56 mmHg.    Aortic Valve  Severe aortic valve calcification is present. There is mild to moderate (1-2+)  aortic regurgitation. Severe valvular aortic stenosis. The calculated aortic  valve are is 0.63 cm^2. The mean AoV pressure gradient is 44mmHg.    Pulmonic Valve  There is mild (1+) pulmonic valvular regurgitation. There is no pulmonic  valvular stenosis.    Vessels  The aorta root is normal. IVC diameter and respiratory changes fall into an  intermediate range suggesting an RA pressure of 8 mmHg.    Pericardium  Small pericardial effusion. There are no echocardiographic indications of  cardiac tamponade.    ______________________________________________________________________________  MMode/2D Measurements & Calculations  IVSd: 1.6 cm  LVIDd: 4.0 cm  LVIDs: 2.8 cm  LVPWd: 1.4 cm  FS: 29.4 %  LV mass(C)d: 229.5 grams  LV mass(C)dI: 166.6 grams/m2  Ao root diam: 3.1 cm  LA dimension: 3.7 cm    asc Aorta Diam: 4.2 cm  LA/Ao: 1.2  LVOT diam: 1.9 cm  LVOT area: 2.8 cm2  RWT: 0.70    Time Measurements  MM HR: 78.0 BPM    Doppler Measurements & Calculations  MV E max lou: 122.0 cm/sec  MV A max lou: 161.8 cm/sec  MV E/A: 0.75  MV max P.1 mmHg  MV mean P.3 mmHg  MV V2 VTI: 36.9 cm  MVA(VTI): 1.9 cm2  MV dec slope: 540.2 cm/sec2  MV dec time: 0.23 sec  Ao V2 max: 400.3 cm/sec  Ao max P.0 mmHg  Ao V2 mean: 304.9 cm/sec  Ao mean P.5 mmHg  Ao V2 VTI: 110.6 cm  GLORIA(I,D): 0.63 cm2  GLORIA(V,D): 0.71 cm2  LV V1 max P.0 mmHg  LV V1 max: 100.3 cm/sec  LV V1 VTI: 24.8 cm  SV(LVOT): 70.2 ml  SI(LVOT): 51.0 ml/m2  PA acc time: 0.12 sec  PI end-d lou: 152.6  cm/sec    TR max casey: 337.8 cm/sec  TR max P.7 mmHg  AV Casey Ratio (DI): 0.25  GLORIA Index (cm2/m2): 0.46  E/E' av.9  Lateral E/e': 26.4  Medial E/e': 37.4    ______________________________________________________________________________  Report approved by: Zoie Trevino 2021 11:41 AM      Lab Review (personally reviewed all results)  Lab Results   Component Value Date     2021    CO2 21 2021    BUN 21 2021     Lab Results   Component Value Date    WBC 8.7 2021    HGB 9.0 2021    HCT 28.2 2021    MCV 93 2021     2021     Lab Results   Component Value Date    CHOL 155 2012    TRIG 110 2012    HDL 69 2012     Troponin I   Date Value Ref Range Status   2021 0.64 (HH) 0.00 - 0.29 ng/mL Final   2021 0.66 (HH) 0.00 - 0.29 ng/mL Final   2021 0.83 (HH) 0.00 - 0.29 ng/mL Final     BNP   Date Value Ref Range Status   2021 2,248 (H) 0 - 167 pg/mL Final   2021 3,542 (H) 0 - 167 pg/mL Final

## 2021-08-07 NOTE — PHARMACY-ADMISSION MEDICATION HISTORY
Pharmacy Note - Admission Medication History    Pertinent Provider Information: Patient is using prednisolone eye drops after cataract surgery.  She reported she only has a small amount left in her bottle at home and she does not need the drops while she is here as she is not expecting to be in the hospital for a prolonged stay.     ______________________________________________________________________    Prior To Admission (PTA) med list completed and updated in EMR.       PTA Med List   Medication Sig Note Last Dose     diclofenac (VOLTAREN) 1 % topical gel Apply 2-4 g topically 3 times daily as needed for moderate pain Apply to knees and back  unknown     ferrous sulfate 325 (65 FE) MG tablet [FERROUS SULFATE 325 (65 FE) MG TABLET] Take 1 tablet (325 mg total) by mouth daily with breakfast.  Past Week at Unknown time     gabapentin (NEURONTIN) 300 MG capsule Take 300 mg by mouth 3 times daily  8/6/2021 at am     ibuprofen (ADVIL,MOTRIN) 200 MG tablet [IBUPROFEN (ADVIL,MOTRIN) 200 MG TABLET] Take 2 tablets (400 mg total) by mouth daily as needed for pain. (Patient taking differently: Take 400 mg by mouth 3 times daily as needed for pain )  8/6/2021 at pm     lidocaine 4 % patch Place 1 patch onto the skin daily as needed Apply to knee  Past Week at Unknown time     morphine (MSIR) 15 MG IR tablet Take 0.5-1 tablet every 4-6 hours as needed for breakthrough pain  Past Week at Unknown time     prednisoLONE acetate (PRED FORTE) 1 % ophthalmic suspension Place 1 drop into the right eye 2 times daily 8/7/2021: Started after cataract surgery.  Patient reports she only has a small amount left in the bottle at home. Past Week at Unknown time     senna-docusate (SENNOSIDES-DOCUSATE SODIUM) 8.6-50 mg tablet Take 1 tablet by mouth 2 times daily as needed for constipation   8/6/2021 at Unknown time       Information source(s): Patient and CareEverywhere/SureScripts  Method of interview communication: in-person    Summary of  Changes to PTA Med List  New: Voltaren gel, prednisolone eye drops  Discontinued: solifenacin  Changed: gabapentin, ibuprofen, senna/docusate     Patient was asked about OTC/herbal products specifically.  PTA med list reflects this.    In the past week, patient estimated taking medication this percent of the time:  greater than 90%.    Allergies were reviewed, assessed, and updated with the patient.      Patient did not bring any medications to the hospital and can't retrieve from home. No multi-dose medications are available for use during hospital stay.     The information provided in this note is only as accurate as the sources available at the time of the update(s).    Thank you for the opportunity to participate in the care of this patient.    Concha Ruff RPH  8/7/2021 8:10 AM

## 2021-08-07 NOTE — H&P
[facility]    History and Physical - Hospitalist Service       Date of Admission:  8/6/2021    Assessment & Plan      Shirley Phipps is a 85 year old female admitted on 8/6/2021. She has history of severe aortic stenosis, chronic pain, mood disorder came in for evaluation of fever and developed hypoxia secondary to volume overload    #Acute CHF exacerbation  Patient was not hypoxic when she came in but received some IV fluid now requiring supplemental oxygen.  She has known severe aortic stenosis, no recent echo that I can see, check echo  BNP was 2200  IV Lasix  Wean O2    #Fever  This was her chief complaint. Reportedly 102F at home.  Not febrile here.  Covid negative.  UA with possible infection, recent urology procedure.  Send culture    #Elevated troponin  Likely demand ischemia secondary to above issues  May be her aortic stenosis has finally reached critical  Serial troponins  Check echo as above    #Recent L2 compression fracture  Unsure of details.  She is currently wearing a TLSO.  She is supposed to see Dr. Marc soon in the neurosurgery clinic, she is their long-term patient  Home morphine, gabapentin    #Underweight    #Hyponatremia  Mild  Probably hypervolemic hyponatremia  Diuresis and trend       Diet: Low Saturated Fat Na <2400 mg    DVT Prophylaxis: Moderate risk. SQH    Andres Catheter: Not present  Central Lines: None  Code Status:   DNR/DNI.  She states that she goes to sleep every night hoping that she will die in her sleep.    Clinically Significant Risk Factors Present on Admission              # Thrombocytopenia: Plts = 110 10e3/uL (Ref range: 150 - 450 10e3/uL) on admission, will monitor for bleeding      Disposition Plan   Expected discharge: 08/09/2021 recommended to prior living arrangement once hypoxia resolved.     The patient's care was discussed with the Patient.    Fannie Bernardo MD  [facility]  Text page via [facility]  Paging/Directory      ______________________________________________________________________    Chief Complaint   Fever    History of Present Illness   Shirley Phipps is a 85 year old female who presents for fever  Patient had urology procedure on 8/4.  She was told to keep an eye out for fever and she did do this, had temperature measured 102F at home which was alarming to her.  She checked it several times and it did not seem to be coming down.  She came into the ER for further evaluation.  She did not take any Tylenol or ibuprofen, but temperature measured here is normal.  She states last time she took ibuprofen was 2 days ago.  She denies any current shortness of breath and is quite baffled by her low oxygen readings here.  States she has never had this before.  Knows that she has a very bad aortic valve and a weak heart but has never had this type of trouble in the past.  She notes she is still clearing her throat a lot since intubation on 8/4.  She complains of nausea and has not been able to eat and drink as much as usual.  She notes she is constipated secondary to pain medication.  She complains of urinary incontinence.  She denies any chest pain.    Review of Systems    The 10 point Review of Systems is negative other than noted in the HPI or here.    Past Medical History    I have reviewed this patient's medical history and updated it with pertinent information if needed.   Past Medical History:   Diagnosis Date     Anemia      Aortic stenosis      Arthritis      Bicuspid aortic valve      Bladder infection      Cancer (H)     SQUAMOUS 2X     Closed burst fracture of lumbar vertebra (H) 03/26/2019    L4 BURST FX WITH RETROPULSION     Dyslipidemia      Fall 03/26/2019     GERD (gastroesophageal reflux disease)      History of transfusion     SHE DOESN'T THINK SHE HAD A TRANSFUSION REACTION     Hypertension        Past Surgical History   I have reviewed this patient's surgical history and updated it with  pertinent information if needed.  Past Surgical History:   Procedure Laterality Date     C RECONSTR TOTAL SHOULDER IMPLANT Left 12/26/2016    Procedure: LEFT REVERSE TOTAL SHOULDER ARTHROPLASTY;  Surgeon: Maurilio Marcelino MD;  [place] [facility] [place]: [place]     COMBINED CYSTOSCOPY, INSERT STENT URETER(S) Right 8/4/2021    Procedure: CYSTOURETEROSCOPY, RETROGRADE PYLEOGRAM RIGHT ;  Surgeon: Marbin Liu MD;  Location: [facility] [place]     OTHER SURGICAL HISTORY Right     surgery for prior wrist fracture     OTHER SURGICAL HISTORY Right     surgery for prior knee fracture     [facility]  12/25/2016            Social History   I have reviewed this patient's social history and updated it with pertinent information if needed.  Social History     Tobacco Use     Smoking status: Never Smoker     Smokeless tobacco: Never Used   Substance Use Topics     Alcohol use: Yes     Alcohol/week: 7.0 standard drinks     Comment: Alcoholic Drinks/day: 1 shot of bee daily     Drug use: No       Family History   I have reviewed this patient's family history and updated it with pertinent information if needed.  Family History   Problem Relation Age of Onset     Diabetes Type 2  Other      Breast Cancer Mother      Diabetes Father      Diabetes Brother        Prior to Admission Medications   Prior to Admission Medications   Prescriptions Last Dose Informant Patient Reported? Taking?   ferrous sulfate 325 (65 FE) MG tablet   No No   Sig: [FERROUS SULFATE 325 (65 FE) MG TABLET] Take 1 tablet (325 mg total) by mouth daily with breakfast.   gabapentin (NEURONTIN) 100 MG capsule   Yes No   Sig: [GABAPENTIN (NEURONTIN) 100 MG CAPSULE] Take 200 mg by mouth 2 (two) times a day.    gabapentin (NEURONTIN) 100 MG capsule   Yes No   Sig: [GABAPENTIN (NEURONTIN) 100 MG CAPSULE] Take 200 mg by mouth daily as needed.   ibuprofen (ADVIL,MOTRIN) 200 MG tablet   No No   Sig: [IBUPROFEN (ADVIL,MOTRIN) 200 MG TABLET] Take 2 tablets (400  mg total) by mouth daily as needed for pain.   lidocaine 4 % patch   Yes No   Sig: [LIDOCAINE 4 % PATCH] Place 1 patch on the skin daily as needed. Remove and discard patch with 12 hours or as directed by MD.    morphine (MSIR) 15 MG IR tablet   No No   Sig: Take 0.5-1 tablet every 4-6 hours as needed for breakthrough pain   senna-docusate (SENNOSIDES-DOCUSATE SODIUM) 8.6-50 mg tablet   Yes No   Sig: [SENNA-DOCUSATE (SENNOSIDES-DOCUSATE SODIUM) 8.6-50 MG TABLET] Take 1 tablet by mouth daily as needed for constipation.    solifenacin (VESICARE) 5 MG tablet   Yes No   Sig: solifenacin 5 mg tablet   TAKE 1 TABLET BY MOUTH EVERY DAY      Facility-Administered Medications: None     Allergies   Allergies   Allergen Reactions     Cefdinir Hives     Clindamycin Nausea     C.Diff     Codeine Nausea and Vomiting     Tolerates oral morphine.     Flagyl [Metronidazole] Angioedema     Sulfa (Sulfonamide Antibiotics) [Sulfa Drugs] Nausea     Canker sores     Tramadol Unknown     Upset stomach       Physical Exam   Vital Signs: Temp: 98.4  F (36.9  C) Temp src: Oral BP: 104/53 Pulse: 76   Resp: 25 SpO2: 97 % O2 Device: Nasal cannula Oxygen Delivery: 2 LPM  Weight: 90 lbs 0 oz    General: in no apparent distress, non-toxic and alert female lying in hospital bed oriented x3  HEENT: Head normocephalic atraumatic, oral mucosa moist. Sclerae anicteric  CV: Regular rhythm, normal rate, soft holosystolic murmur heard best LUSB  Resp: No wheezes, no rales or rhonchi, no focal consolidations  GI: Belly soft, nondistended, nontender, bowel sounds present  Skin: No rashes or lesions  Extremities: No peripheral edema  Psych: Normal affect, anxious mood  Neuro: CNII-XII grossly intact, moving all 4 extremities    Data   Data reviewed today: I reviewed all medications, new labs and imaging results over the last 24 hours.    Recent Results (from the past 12 hour(s))   Basic metabolic panel    Collection Time: 08/06/21 10:54 PM   Result Value Ref  Range    Sodium 135 (L) 136 - 145 mmol/L    Potassium 4.7 3.5 - 5.0 mmol/L    Chloride 107 98 - 107 mmol/L    Carbon Dioxide (CO2) 21 (L) 22 - 31 mmol/L    Anion Gap 7 5 - 18 mmol/L    Urea Nitrogen 21 8 - 28 mg/dL    Creatinine 0.91 0.60 - 1.10 mg/dL    Calcium 10.0 8.5 - 10.5 mg/dL    Glucose 101 70 - 125 mg/dL    GFR Estimate 58 (L) >60 mL/min/1.73m2   Lactic acid whole blood    Collection Time: 08/06/21 10:54 PM   Result Value Ref Range    Lactic Acid 1.2 0.7 - 2.0 mmol/L   Magnesium    Collection Time: 08/06/21 10:54 PM   Result Value Ref Range    Magnesium 1.6 (L) 1.8 - 2.6 mg/dL   CBC with platelets and differential    Collection Time: 08/06/21 10:54 PM   Result Value Ref Range    WBC Count 8.7 4.0 - 11.0 10e3/uL    RBC Count 3.03 (L) 3.80 - 5.20 10e6/uL    Hemoglobin 9.0 (L) 11.7 - 15.7 g/dL    Hematocrit 28.2 (L) 35.0 - 47.0 %    MCV 93 78 - 100 fL    MCH 29.7 26.5 - 33.0 pg    MCHC 31.9 31.5 - 36.5 g/dL    RDW 13.6 10.0 - 15.0 %    Platelet Count 110 (L) 150 - 450 10e3/uL   Manual Differential    Collection Time: 08/06/21 10:54 PM   Result Value Ref Range    % Neutrophils 75 %    % Lymphocytes 16 %    % Monocytes 9 %    % Eosinophils 0 %    % Basophils 0 %    Absolute Neutrophils 6.5 1.6 - 8.3 10e3/uL    Absolute Lymphocytes 1.4 0.8 - 5.3 10e3/uL    Absolute Monocytes 0.8 0.0 - 1.3 10e3/uL    Absolute Eosinophils 0.0 0.0 - 0.7 10e3/uL    Absolute Basophils 0.0 0.0 - 0.2 10e3/uL    RBC Morphology Confirmed RBC Indices     Platelet Assessment  Automated Count Confirmed. Platelet morphology is normal.     Automated Count Confirmed. Platelet morphology is normal.    Isaias Cells Slight (A) None Seen    Elliptocytes Slight (A) None Seen   Troponin I (now)    Collection Time: 08/06/21 10:54 PM   Result Value Ref Range    Troponin I 0.64 () 0.00 - 0.29 ng/mL   B-Type Natriuretic Peptide ([facility] Only)    Collection Time: 08/06/21 10:54 PM   Result Value Ref Range    BNP 2,248 (H) 0 - 167 pg/mL   SARS-COV2  (COVID-19) Virus RT-PCR    Collection Time: 08/06/21 11:13 PM    Specimen: Nasopharyngeal; Swab   Result Value Ref Range    SARS CoV2 PCR Negative Negative   UA with Microscopic reflex to Culture    Collection Time: 08/07/21  1:37 AM    Specimen: Urine, Clean Catch   Result Value Ref Range    Color Urine Light Yellow Colorless, Straw, Light Yellow, Yellow    Appearance Urine Clear Clear    Glucose Urine Negative Negative mg/dL    Bilirubin Urine Negative Negative    Ketones Urine Negative Negative mg/dL    Specific Gravity Urine 1.012 1.001 - 1.030    Blood Urine 0.2 mg/dL (A) Negative    pH Urine 5.0 5.0 - 7.0    Protein Albumin Urine Negative Negative mg/dL    Urobilinogen Urine <2.0 <2.0 mg/dL    Nitrite Urine Negative Negative    Leukocyte Esterase Urine 25 Patsy/uL (A) Negative    Bacteria Urine Few (A) None Seen /HPF    Mucus Urine Present (A) None Seen /LPF    RBC Urine 25 (H) <=2 /HPF    WBC Urine 8 (H) <=5 /HPF

## 2021-08-08 VITALS
HEIGHT: 63 IN | SYSTOLIC BLOOD PRESSURE: 108 MMHG | OXYGEN SATURATION: 97 % | HEART RATE: 70 BPM | RESPIRATION RATE: 18 BRPM | TEMPERATURE: 98 F | WEIGHT: 96.1 LBS | DIASTOLIC BLOOD PRESSURE: 53 MMHG | BODY MASS INDEX: 17.03 KG/M2

## 2021-08-08 LAB
ANION GAP SERPL CALCULATED.3IONS-SCNC: 7 MMOL/L (ref 5–18)
BACTERIA UR CULT: NORMAL
BUN SERPL-MCNC: 15 MG/DL (ref 8–28)
CALCIUM SERPL-MCNC: 8.5 MG/DL (ref 8.5–10.5)
CHLORIDE BLD-SCNC: 108 MMOL/L (ref 98–107)
CO2 SERPL-SCNC: 24 MMOL/L (ref 22–31)
CREAT SERPL-MCNC: 0.76 MG/DL (ref 0.6–1.1)
ERYTHROCYTE [DISTWIDTH] IN BLOOD BY AUTOMATED COUNT: 13.5 % (ref 10–15)
GFR SERPL CREATININE-BSD FRML MDRD: 72 ML/MIN/1.73M2
GLUCOSE BLD-MCNC: 90 MG/DL (ref 70–125)
HCT VFR BLD AUTO: 25.8 % (ref 35–47)
HGB BLD-MCNC: 8 G/DL (ref 11.7–15.7)
MAGNESIUM SERPL-MCNC: 1.9 MG/DL (ref 1.8–2.6)
MCH RBC QN AUTO: 28.4 PG (ref 26.5–33)
MCHC RBC AUTO-ENTMCNC: 31 G/DL (ref 31.5–36.5)
MCV RBC AUTO: 92 FL (ref 78–100)
PLATELET # BLD AUTO: 111 10E3/UL (ref 150–450)
POTASSIUM BLD-SCNC: 3.7 MMOL/L (ref 3.5–5)
RBC # BLD AUTO: 2.82 10E6/UL (ref 3.8–5.2)
SODIUM SERPL-SCNC: 139 MMOL/L (ref 136–145)
WBC # BLD AUTO: 4.5 10E3/UL (ref 4–11)

## 2021-08-08 PROCEDURE — 83735 ASSAY OF MAGNESIUM: CPT | Performed by: HOSPITALIST

## 2021-08-08 PROCEDURE — 85014 HEMATOCRIT: CPT | Performed by: INTERNAL MEDICINE

## 2021-08-08 PROCEDURE — 99239 HOSP IP/OBS DSCHRG MGMT >30: CPT | Performed by: HOSPITALIST

## 2021-08-08 PROCEDURE — 250N000011 HC RX IP 250 OP 636: Performed by: INTERNAL MEDICINE

## 2021-08-08 PROCEDURE — 80048 BASIC METABOLIC PNL TOTAL CA: CPT | Performed by: INTERNAL MEDICINE

## 2021-08-08 PROCEDURE — 250N000013 HC RX MED GY IP 250 OP 250 PS 637: Performed by: INTERNAL MEDICINE

## 2021-08-08 PROCEDURE — 36415 COLL VENOUS BLD VENIPUNCTURE: CPT | Performed by: INTERNAL MEDICINE

## 2021-08-08 RX ORDER — FUROSEMIDE 20 MG
20 TABLET ORAL DAILY
Qty: 30 TABLET | Refills: 0 | Status: SHIPPED | OUTPATIENT
Start: 2021-08-08 | End: 2022-01-01

## 2021-08-08 RX ORDER — FUROSEMIDE 10 MG/ML
20 INJECTION INTRAMUSCULAR; INTRAVENOUS DAILY
Status: DISCONTINUED | OUTPATIENT
Start: 2021-08-09 | End: 2021-08-08 | Stop reason: HOSPADM

## 2021-08-08 RX ADMIN — PREDNISOLONE ACETATE 1 DROP: 10 SUSPENSION/ DROPS OPHTHALMIC at 09:26

## 2021-08-08 RX ADMIN — GABAPENTIN 300 MG: 300 CAPSULE ORAL at 09:27

## 2021-08-08 RX ADMIN — HEPARIN SODIUM 5000 UNITS: 10000 INJECTION, SOLUTION INTRAVENOUS; SUBCUTANEOUS at 09:27

## 2021-08-08 RX ADMIN — Medication 7.5 MG: at 06:49

## 2021-08-08 RX ADMIN — LIDOCAINE 1 PATCH: 246 PATCH TOPICAL at 09:27

## 2021-08-08 RX ADMIN — Medication 7.5 MG: at 09:28

## 2021-08-08 RX ADMIN — GABAPENTIN 300 MG: 300 CAPSULE ORAL at 14:19

## 2021-08-08 RX ADMIN — IBUPROFEN 200 MG: 200 TABLET, FILM COATED ORAL at 11:38

## 2021-08-08 RX ADMIN — FUROSEMIDE 20 MG: 10 INJECTION, SOLUTION INTRAVENOUS at 05:57

## 2021-08-08 ASSESSMENT — MIFFLIN-ST. JEOR: SCORE: 843.69

## 2021-08-08 NOTE — DISCHARGE INSTRUCTIONS
You have been accepted by [facility]. They should call within 48 hours post discharge to arrange home care services. If they do not call with in 48 hours or you need to get in contact with them, they can be reached at [phone].

## 2021-08-08 NOTE — PROGRESS NOTES
Pt. Discharged to home with help from her son. Home care set up for pt. Pt. Informed of need to follow up with primary MD and number given for cardiology follow up. Pt. Left floor by wheelchair with son picking her up downstairs.     Hailey Rios RN

## 2021-08-08 NOTE — PLAN OF CARE
Problem: Hypertension Comorbidity  Goal: Blood Pressure in Desired Range  Outcome: Improving   - Lasix  - Monitor BP    Problem: Adult Inpatient Plan of Care  Goal: Plan of Care Review  Outcome: Improving  - Cardiology consulted  - Tele

## 2021-08-08 NOTE — PLAN OF CARE
Physical Therapy: Orders received. Chart reviewed and discussed with care team.? Physical Therapy not indicated due to pt declining PT in hospital. Patient agreeable to home care PT.? Will complete orders.     Thank you,  Selina Bradley PT

## 2021-08-08 NOTE — DISCHARGE SUMMARY
Physician Discharge Summary        Primary Care Physician:  Desirae Thomson Admission Date: 8/6/2021   Discharge Provider: Oc Berry DO Discharge Date: 8/8/2021   Disposition: Home with home health Code Status: No CPR- Do NOT Intubate   Activity: per therapy recommendations Diet: Orders Placed This Encounter      Low Saturated Fat Na <2400 mg      Diet        Condition at Discharge: stable, guarded      Discharge Diagnoses/Hospital Summary:      85 year old female admitted on 8/6/2021. She has history of severe aortic stenosis, chronic pain, mood disorder came in for evaluation of fever and developed hypoxia secondary to volume overloa     Acute HFpEF CHF exacerbation, severe aortic stenosis: Patient was not hypoxic when she came in but received some IV fluid now requiring supplemental oxygen.  She has known severe aortic stenosis, no recent echo that I can see, check echo. BNP was 2200  - Lasix daily.   - Close outpatient follow up.   - Discussed with patient and son via telephone aortic stenosis and options for treatment. Discussed risks and benefits at length, verbalized understanding, patient wanting to go home at this time, not interested in TCU.      Fever This was her chief complaint. Reportedly 102F at home.  Not febrile here.  Covid negative.  - Urine culture negative.   - Afebrile.   - Close outpatient follow up.      Elevated troponin Likely demand ischemia secondary to above issues. May be her aortic stenosis has finally reached critical  - Cardiology signed off.   - Echo with aortic stenosis, discussed with patient and son via phone at length, verbalized understanding, see report.      Recent L2 compression fracture: Unsure of details, per report.    - TLSO.  - see Dr. Monico beebe in the neurosurgery clinic  - Home morphine, gabapentin     Hyponatremia: Mild.   - Monitor closely as outpatient.     Thrombocytopenia: Plts = 110 10e3/uL (Ref range: 150 - 450 10e3/uL) on admission, will monitor  for bleeding                         Discharge Medications:      Review of your medicines      START taking      Dose / Directions   furosemide 20 MG tablet  Commonly known as: LASIX      Dose: 20 mg  Take 1 tablet (20 mg) by mouth daily  Quantity: 30 tablet  Refills: 0        CONTINUE these medicines which have NOT CHANGED      Dose / Directions   diclofenac 1 % topical gel  Commonly known as: VOLTAREN      Dose: 2-4 g  Apply 2-4 g topically 3 times daily as needed for moderate pain Apply to knees and back  Refills: 0     ferrous sulfate 325 (65 Fe) MG tablet  Commonly known as: FEROSUL  Used for: Iron deficiency      Dose: 1 tablet  [FERROUS SULFATE 325 (65 FE) MG TABLET] Take 1 tablet (325 mg total) by mouth daily with breakfast.  Quantity: 90 tablet  Refills: 0     gabapentin 300 MG capsule  Commonly known as: NEURONTIN      Dose: 300 mg  Take 300 mg by mouth 3 times daily  Refills: 0     Lidocaine 4 % Patch  Commonly known as: LIDOCARE  Notes to patient: Remove patch on 8/8 at 930 pm      Dose: 1 patch  Place 1 patch onto the skin daily as needed Apply to knee  Refills: 0     morphine 15 MG IR tablet  Commonly known as: MSIR      Take 0.5-1 tablet every 4-6 hours as needed for breakthrough pain  Quantity: 12 tablet  Refills: 0     prednisoLONE acetate 1 % ophthalmic suspension  Commonly known as: PRED FORTE      Dose: 1 drop  Place 1 drop into the right eye 2 times daily  Refills: 0     senna-docusate 8.6-50 MG tablet  Commonly known as: SENOKOT-S/PERICOLACE      Dose: 1 tablet  Take 1 tablet by mouth 2 times daily as needed for constipation  Refills: 0        STOP taking    ibuprofen 200 MG tablet  Commonly known as: ADVIL/MOTRIN              Where to get your medicines      These medications were sent to [place] [id number] - [facility], [place] - [address]  [address], [facility] [address]    Phone: [phone]     furosemide 20 MG tablet               Discharge Instructions:  Follow up  appointment with Primary Care Physician: Desirae Thomson  Follow up appointment with Specialist: Cardiology.        Vital Signs in last 24 hours:    Temp:  [97.9  F (36.6  C)-98.3  F (36.8  C)] 98  F (36.7  C)  Pulse:  [66-84] 70  Resp:  [16-21] 18  BP: ()/(50-58) 108/53  SpO2:  [94 %-98 %] 97 %    GENERAL: Alert. NAD. Conversational, appropriate.   HEENT: Normocephalic. EOMI. No icterus or injection. Nares normal.   LUNGS: Minimal decrement. No dyspnea at rest.   HEART: Regular rate, systolic murmur. Extremities perfused.   ABDOMEN: Soft, nontender, and nondistended. Positive bowel sounds.   EXTREMITIES: Minimal LE edema noted.   NEUROLOGIC: Moves extremities x4, follows commands.        Total Time on discharge process is: 32 minutes    Dr. Oc Berry DO, MBA  Internal Medicine Hospitalist  8/8/2021

## 2021-08-08 NOTE — PROGRESS NOTES
Care Management Discharge Note    Discharge Date: 08/08/2021  Expected Time of Departure:  (Pt will call son when she is getting dressed to transport. )    Discharge Disposition: Home Care    Discharge Services: None    Discharge DME: None    Discharge Transportation: family or friend will provide (Son will transport. )    Private pay costs discussed: Not applicable    PAS Confirmation Code:  (Not needed)  Patient/family educated on Medicare website which has current facility and service quality ratings: yes    Education Provided on the Discharge Plan:    Persons Notified of Discharge Plans: Patient  Patient/Family in Agreement with the Plan: yes    Handoff Referral Completed: Yes    Additional Information:  Met with patient in room to discuss plan for discharge today. She is in agreement. Patient is interested in home care services at discharge. Per patient has worked with home care in the past, but does not remember name of agency. Is open to working with any home care agency. Sent referral to [facility] home care; they will review. Son will transport home; patient will call once she is getting dressed to transport.    12:23 PM [facility] accepted for home care services; home care information attached to AVS.     Katy Valdivia RN

## 2021-08-08 NOTE — PLAN OF CARE
Pt is alert and oriented x4   Problem: Hypertension Comorbidity  Goal: Blood Pressure in Desired Range  Outcome: Improving   - IV Lasix  -Monitor BP    Pt denied pain for me and stated she slept well after having melatonin and morphine.     Lungs are diminished with crackles in the bases.     Pt has pressure sores on back and buttocks. Placed mepilex on both. Frequent repositioning. pillow support.

## 2021-08-08 NOTE — PROVIDER NOTIFICATION
Pt. Refusing home oxygen evaluation. Pt. 98% on room air. Pt. States she does not walk that far or that much. Message sent to MD.   Hailey Rios, RN

## 2021-08-08 NOTE — PROGRESS NOTES
[facility]  Hospitalist Progress Note  [facility]        Date of Service (when I saw the patient): 08/08/2021  Oc Berry,   08/08/2021        Interval History:      Patient states she is feeling better, adamantly wishes to go home.          Assessment and Plan:        85 year old female admitted on 8/6/2021. She has history of severe aortic stenosis, chronic pain, mood disorder came in for evaluation of fever and developed hypoxia secondary to volume overloa    Acute CHF exacerbation, severe aortic stenosis: Patient was not hypoxic when she came in but received some IV fluid now requiring supplemental oxygen.  She has known severe aortic stenosis, no recent echo that I can see, check echo. BNP was 2200  - Lasix daily.   - Wean O2    Fever This was her chief complaint. Reportedly 102F at home.  Not febrile here.  Covid negative.  - Urine culture negative thus far.   - Afebrile.     Elevated troponin Likely demand ischemia secondary to above issues. May be her aortic stenosis has finally reached critical  - Cardiology signed off.   - Echo with aortic stenosis, discussed with patient and son via phone at length, verbalized understanding, see report.     Recent L2 compression fracture: Unsure of details, per report.    - TLSO.  - see Dr. Monico beebe in the neurosurgery clinic  - Home morphine, gabapentin    Hyponatremia: Mild.   - Monitor.     DVT Prophylaxis: SQH    Thrombocytopenia: Plts = 110 10e3/uL (Ref range: 150 - 450 10e3/uL) on admission, will monitor for bleeding      Diet: Orders Placed This Encounter      Low Saturated Fat Na <2400 mg    Code: No CPR- Do NOT Intubate    Anticipated discharge date: Today or tomorrow.   Milestones/needs for discharge: Cardiology clearance.   Pending tests or labs: None.   Status of family updates: Updated patient and son via telephone.   Length of Stay:  LOS: 1 day /LOS: 1                   Physical Exam:           Blood  "pressure 105/53, pulse 66, temperature 98.2  F (36.8  C), temperature source Oral, resp. rate 18, height 1.59 m (5' 2.6\"), weight 43.6 kg (96 lb 1.6 oz), SpO2 96 %.    Vital Sign Ranges  Temperature Temp  Av.2  F (36.8  C)  Min: 97.9  F (36.6  C)  Max: 98.3  F (36.8  C)   Blood pressure Systolic (24hrs), Av , Min:91 , Max:125        Diastolic (24hrs), Av, Min:50, Max:60      Pulse Pulse  Av.4  Min: 66  Max: 84   Respirations Resp  Av.5  Min: 3  Max: 28   Pulse oximetry SpO2  Av.5 %  Min: 90 %  Max: 98 %     Vital Signs with Ranges  Temp:  [97.9  F (36.6  C)-98.3  F (36.8  C)] 98.2  F (36.8  C)  Pulse:  [66-84] 66  Resp:  [3-28] 18  BP: ()/(50-60) 105/53  SpO2:  [90 %-98 %] 96 %  Temp:  [97.9  F (36.6  C)-98.3  F (36.8  C)] 98.2  F (36.8  C)  Pulse:  [66-84] 66  Resp:  [3-28] 18  BP: ()/(50-60) 105/53  SpO2:  [90 %-98 %] 96 %    I/O Last 3 Shifts:   I/O last 3 completed shifts:  In: 540 [P.O.:540]  Out: 1900 [Urine:1900]    I/O past 24 hours:     Intake/Output Summary (Last 24 hours) at 2021 0716  Last data filed at 2021 0658  Gross per 24 hour   Intake 540 ml   Output 1900 ml   Net -1360 ml       Oxygen  Temp: 98.2  F (36.8  C) Temp src: Oral BP: 105/53 Pulse: 66   Resp: 18 SpO2: 96 % O2 Device: None (Room air) Oxygen Delivery: 2 LPM  Vitals:    21 2208 21 0404   Weight: 40.8 kg (90 lb) 43.6 kg (96 lb 1.6 oz)       Lines  Peripheral IV Right Lower forearm (Active)   Site Assessment WDL 21 0000   Line Status Saline locked 21 0000   Phlebitis Scale 0-->no symptoms 21 0000   Infiltration Scale 0 21 0000   Infiltration Site Treatment Method  None 21 0000   Number of days:      GENERAL: Alert. NAD. Conversational, appropriate.   HEENT: Normocephalic. EOMI. No icterus or injection. Nares normal.   LUNGS: Minimal decrement. No dyspnea at rest.   HEART: Regular rate, systolic murmur. Extremities perfused.   ABDOMEN: Soft, nontender, " and nondistended. Positive bowel sounds.   EXTREMITIES: Minimal LE edema noted.   NEUROLOGIC: Moves extremities x4, follows commands.          Prior to Admission Medications:        Medications Prior to Admission   Medication Sig Dispense Refill Last Dose     diclofenac (VOLTAREN) 1 % topical gel Apply 2-4 g topically 3 times daily as needed for moderate pain Apply to knees and back   unknown     ferrous sulfate 325 (65 FE) MG tablet [FERROUS SULFATE 325 (65 FE) MG TABLET] Take 1 tablet (325 mg total) by mouth daily with breakfast. 90 tablet 0 Past Week at Unknown time     gabapentin (NEURONTIN) 300 MG capsule Take 300 mg by mouth 3 times daily   8/6/2021 at am     ibuprofen (ADVIL,MOTRIN) 200 MG tablet [IBUPROFEN (ADVIL,MOTRIN) 200 MG TABLET] Take 2 tablets (400 mg total) by mouth daily as needed for pain. (Patient taking differently: Take 400 mg by mouth 3 times daily as needed for pain )  0 8/6/2021 at pm     lidocaine 4 % patch Place 1 patch onto the skin daily as needed Apply to knee   Past Week at Unknown time     morphine (MSIR) 15 MG IR tablet Take 0.5-1 tablet every 4-6 hours as needed for breakthrough pain 12 tablet 0 Past Week at Unknown time     prednisoLONE acetate (PRED FORTE) 1 % ophthalmic suspension Place 1 drop into the right eye 2 times daily   Past Week at Unknown time     senna-docusate (SENNOSIDES-DOCUSATE SODIUM) 8.6-50 mg tablet Take 1 tablet by mouth 2 times daily as needed for constipation    8/6/2021 at Unknown time            Medications:        Current Facility-Administered Medications   Medication Last Rate     Current Facility-Administered Medications   Medication Dose Route Frequency     furosemide  20 mg Intravenous Q12H     gabapentin  300 mg Oral TID     heparin ANTICOAGULANT  5,000 Units Subcutaneous Q12H     prednisoLONE acetate  1 drop Right Eye BID     sodium chloride (PF)  3 mL Intracatheter Q8H     Current Facility-Administered Medications   Medication Dose Route Frequency      acetaminophen  650 mg Oral Q6H PRN    Or     acetaminophen  650 mg Rectal Q6H PRN     bisacodyl  10 mg Rectal Daily PRN     calcium carbonate  1,000 mg Oral 4x Daily PRN     diclofenac  2-4 g Topical TID PRN     famotidine  20 mg Oral Daily PRN     ibuprofen  200 mg Oral Q6H PRN     Lidocaine  1 patch Transdermal Daily PRN     lidocaine 4%   Topical Q1H PRN     lidocaine (buffered or not buffered)  0.1-1 mL Other Q1H PRN     magnesium hydroxide  30 mL Oral Daily PRN     melatonin  1 mg Oral At Bedtime PRN     morphine  7.5-15 mg Oral Q6H PRN     ondansetron  4 mg Oral Q6H PRN    Or     ondansetron  4 mg Intravenous Q6H PRN     polyethylene glycol  17 g Oral Daily PRN     sodium chloride (PF)  3 mL Intracatheter q1 min prn     ___________________________________________________________________________  Medications       furosemide  20 mg Intravenous Q12H     gabapentin  300 mg Oral TID     heparin ANTICOAGULANT  5,000 Units Subcutaneous Q12H     prednisoLONE acetate  1 drop Right Eye BID     sodium chloride (PF)  3 mL Intracatheter Q8H          Data:      Lab data reviewed.     Data   Recent Labs   Lab 08/08/21  0506 08/07/21  1240 08/06/21  2254   WBC 4.5  --  8.7   HGB 8.0*  --  9.0*   MCV 92  --  93   *  --  110*    137 135*   POTASSIUM 3.7 4.2 4.7   CHLORIDE 108* 108* 107   CO2 24 20* 21*   BUN 15 19 21   CR 0.76 0.82 0.91   ANIONGAP 7 9 7   LEIF 8.5 9.0 10.0   GLC 90 95 101   ALBUMIN  --  3.4*  --    PROTTOTAL  --  6.0  --    BILITOTAL  --  0.8  --    ALKPHOS  --  67  --    ALT  --  <9  --    AST  --  13  --            Imaging:      Imaging data reviewed.     Recent Results (from the past 24 hour(s))   Echocardiogram Complete   Result Value    LVEF  60-65%    Narrative    [id number]  [id number]  [id number]  [id number]^WHITE-VINCE^CORRINA^R                                                                       Version 2     [place] [id number]     Name: TEO KO  POPPY  MRN: [id number]  : 1935  Study Date: 2021 09:15 AM  Age: 85 yrs  Gender: Female  Patient Location: [facility]  Reason For Study: CHF, Aortic Valve Disorder  Ordering Physician: CORRINA BEAR  Performed By: RORY     BSA: 1.4 m2  Height: 63 in  Weight: 90 lb  HR: 83  ______________________________________________________________________________  ______________________________________________________________________________  Interpretation Summary     Left ventricular size, wall motion and function are normal. The ejection  fraction is 60-65%. There is moderate to severe concentric left ventricular  hypertrophy. Diastolic Doppler findings (E/E' ratio and/or other parameters)  suggest left ventricular filling pressures are increased.  Normal right ventricle size and systolic function.  The left atrium is moderately dilated.  Severe valvular aortic stenosis.  The mean AoV pressure gradient is 44mmHg.  There is mild mitral stenosis.  There is mild to moderate (1-2+) mitral regurgitation.  There is mild to moderate (1-2+) tricuspid regurgitation.  The estimated pulmonary artery systolic pressure is 56 mmHg.  Small pericardial effusion  There are no echocardiographic indications of cardiac tamponade.  No previous study for comparison.     ______________________________________________________________________________  I      WMSI = 1.00     % Normal = 100     X - Cannot   0 -                      (2) - Mildly 2 -          Segments  Size  Interpret    Hyperkinetic 1 - Normal  Hypokinetic  Hypokinetic  1-2     small                                                     7 -          3-5      moderate  3 - Akinetic 4 -          5 -         6 - Akinetic Dyskinetic   6-14    large               Dyskinetic   Aneurysmal  w/scar       w/scar       15-16   diffuse     Left Ventricle  Left ventricular size, wall motion and function are normal. The ejection  fraction is 60-65%. There is moderate to severe concentric left  ventricular  hypertrophy. Diastolic Doppler findings (E/E' ratio and/or other parameters)  suggest left ventricular filling pressures are increased.     Right Ventricle  Normal right ventricle size and systolic function.     Atria  The left atrium is moderately dilated. The right atrium is mildly dilated.  There is no atrial shunt seen.     Mitral Valve  The mitral valve leaflets appear thickened, but open well. There is mild to  moderate (1-2+) mitral regurgitation. There is mild mitral stenosis.     Tricuspid Valve  Tricuspid leaflets are thickened. There is mild to moderate (1-2+) tricuspid  regurgitation. There is no tricuspid stenosis. The estimated pulmonary artery  systolic pressure is 56 mmHg.     Aortic Valve  Severe aortic valve calcification is present. There is mild to moderate (1-2+)  aortic regurgitation. Severe valvular aortic stenosis. The calculated aortic  valve are is 0.63 cm^2. The mean AoV pressure gradient is 44mmHg.     Pulmonic Valve  There is mild (1+) pulmonic valvular regurgitation. There is no pulmonic  valvular stenosis.     Vessels  The aorta root is normal. IVC diameter and respiratory changes fall into an  intermediate range suggesting an RA pressure of 8 mmHg.     Pericardium  Small pericardial effusion. There are no echocardiographic indications of  cardiac tamponade.     ______________________________________________________________________________  MMode/2D Measurements & Calculations  IVSd: 1.6 cm  LVIDd: 4.0 cm  LVIDs: 2.8 cm  LVPWd: 1.4 cm  FS: 29.4 %  LV mass(C)d: 229.5 grams  LV mass(C)dI: 166.6 grams/m2  Ao root diam: 3.1 cm  LA dimension: 3.7 cm     asc Aorta Diam: 4.2 cm  LA/Ao: 1.2  LVOT diam: 1.9 cm  LVOT area: 2.8 cm2  RWT: 0.70     Time Measurements  MM HR: 78.0 BPM     Doppler Measurements & Calculations  MV E max lou: 122.0 cm/sec  MV A max lou: 161.8 cm/sec  MV E/A: 0.75  MV max P.1 mmHg  MV mean P.3 mmHg  MV V2 VTI: 36.9 cm  MVA(VTI): 1.9 cm2  MV dec  slope: 540.2 cm/sec2  MV dec time: 0.23 sec  Ao V2 max: 400.3 cm/sec  Ao max P.0 mmHg  Ao V2 mean: 304.9 cm/sec  Ao mean P.5 mmHg  Ao V2 VTI: 110.6 cm  GLORIA(I,D): 0.63 cm2  GLORIA(V,D): 0.71 cm2  LV V1 max P.0 mmHg  LV V1 max: 100.3 cm/sec  LV V1 VTI: 24.8 cm  SV(LVOT): 70.2 ml  SI(LVOT): 51.0 ml/m2  PA acc time: 0.12 sec  PI end-d casey: 152.6 cm/sec     TR max casey: 337.8 cm/sec  TR max P.7 mmHg  AV Casey Ratio (DI): 0.25  GLORIA Index (cm2/m2): 0.46  E/E' av.9  Lateral E/e': 26.4  Medial E/e': 37.4     ______________________________________________________________________________  Report approved by: Zoie Trevino 2021 11:41 AM             Dr. Oc Berry DO, MARIUSZ  [facility]  Pager [phone]

## 2021-08-08 NOTE — PROGRESS NOTES
Rt to see patient for home 02 eval, she is on room air in no resp distress eating lunch. Rest room air sat 97%, patient does not qualify for home 02 at this time. And she does not want it at this time.

## 2021-08-09 ENCOUNTER — PATIENT OUTREACH (OUTPATIENT)
Dept: CARE COORDINATION | Facility: CLINIC | Age: 86
End: 2021-08-09

## 2021-08-09 DIAGNOSIS — Z71.89 OTHER SPECIFIED COUNSELING: ICD-10-CM

## 2021-08-09 NOTE — PROGRESS NOTES
Clinic Care Coordination Contact  [facility]: Post-Discharge Note  SITUATION                                                      Admission:    Admission Date: 08/06/21   Reason for Admission: Fever  Discharge:   Discharge Date: 08/08/21  Discharge Diagnosis: Acute CHF exacerbation, severe aortic stenosis  Discharge Service: Home Care PT/OT    BACKGROUND                                                      Shirley Phipps is a 85 year old female admitted on 8/6/2021. She has history of severe aortic stenosis, chronic pain, mood disorder came in for evaluation of fever and developed hypoxia secondary to volume overload    ASSESSMENT      Discharge Assessment  How are you doing now that you are home?: I am okay  How are your symptoms? (Red Flag symptoms escalate to triage hotline per guidelines): Improved  Do you feel your condition is stable enough to be safe at home until your provider visit?: Yes  Were you started on any new medications or were there changes to any of your previous medications? : Yes - Schedule RN[facility] appt within 48 business hours  Discharge follow-up appointment scheduled within 14 calendar days? : No  Is patient agreeable to assistance with scheduling? : No    Post-op  Did the patient have surgery or a procedure: No  Fever: No  Chills: No  PO Intake: low fat diet  Bowel Function: constipation  Urinary Status: voiding without complaint/concerns      PLAN                                                      Outpatient Plan:    Follow up appointment with Primary Care Physician: Desirae Thomson  Follow up appointment with Specialist: Cardiology.     No future appointments.      For any urgent concerns, please contact our 24 hour nurse triage line: [phone] ([phone])         SHAHRIAR Toledo  [phone]  [facility]  [facility]

## 2021-08-12 LAB
BACTERIA BLD CULT: NO GROWTH
BACTERIA BLD CULT: NO GROWTH

## 2021-08-16 ENCOUNTER — TELEPHONE (OUTPATIENT)
Dept: NEUROSURGERY | Facility: CLINIC | Age: 86
End: 2021-08-16

## 2021-08-16 DIAGNOSIS — S32.020A CLOSED COMPRESSION FRACTURE OF L2 LUMBAR VERTEBRA, INITIAL ENCOUNTER (H): Primary | ICD-10-CM

## 2021-08-16 LAB
ATRIAL RATE - MUSE: 76 BPM
DIASTOLIC BLOOD PRESSURE - MUSE: 66 MMHG
INTERPRETATION ECG - MUSE: NORMAL
P AXIS - MUSE: 62 DEGREES
PR INTERVAL - MUSE: 170 MS
QRS DURATION - MUSE: 130 MS
QT - MUSE: 410 MS
QTC - MUSE: 461 MS
R AXIS - MUSE: 46 DEGREES
SYSTOLIC BLOOD PRESSURE - MUSE: 115 MMHG
T AXIS - MUSE: 52 DEGREES
VENTRICULAR RATE- MUSE: 76 BPM

## 2021-08-16 RX ORDER — MORPHINE SULFATE 15 MG/1
TABLET ORAL
Qty: 5 TABLET | Refills: 0 | Status: SHIPPED | OUTPATIENT
Start: 2021-08-16 | End: 2021-08-20

## 2021-08-16 NOTE — TELEPHONE ENCOUNTER
Patient is requesting a return call to discuss her pain. She had an appointment scheduled with Dr. Marc on 08.13.21 but was unable to keep the appointment due to the amount of pain she's in and her ability to move around to get to the appointment.    Best number to reach her: [phone]

## 2021-08-16 NOTE — TELEPHONE ENCOUNTER
Patient due to have an appt to evaluate fracture but was unable to attend due to pain. Previous notes evaluated, post-discharge for her fracture she has previously noted only mild pain in the low back, she reported no issues when care connection called her. Cannot necessarily attribute this pain shes reporting to fracture as she has also had kidney stones, sepsis etc. I'll order small amt of medication and CT to better evaluate. She should also reach out to pcp to ensure no other issues.

## 2021-08-16 NOTE — TELEPHONE ENCOUNTER
8/16/21 Patient called again. She stated no one has called her yet. I did explain that she will receive a cb today. Patient stated she needs to be seen but also needs pain medicine in order to leave her house.

## 2021-08-17 ENCOUNTER — MEDICAL CORRESPONDENCE (OUTPATIENT)
Dept: NEUROSURGERY | Facility: CLINIC | Age: 86
End: 2021-08-17

## 2021-08-17 ENCOUNTER — HOSPITAL ENCOUNTER (OUTPATIENT)
Dept: CT IMAGING | Facility: HOSPITAL | Age: 86
Discharge: HOME | End: 2021-08-17
Attending: NURSE PRACTITIONER | Admitting: NURSE PRACTITIONER
Payer: COMMERCIAL

## 2021-08-17 DIAGNOSIS — S32.020A CLOSED COMPRESSION FRACTURE OF L2 LUMBAR VERTEBRA, INITIAL ENCOUNTER (H): ICD-10-CM

## 2021-08-17 PROCEDURE — 72131 CT LUMBAR SPINE W/O DYE: CPT

## 2021-08-20 ENCOUNTER — OFFICE VISIT (OUTPATIENT)
Dept: NEUROSURGERY | Facility: CLINIC | Age: 86
End: 2021-08-20
Payer: COMMERCIAL

## 2021-08-20 VITALS
HEART RATE: 89 BPM | DIASTOLIC BLOOD PRESSURE: 57 MMHG | WEIGHT: 96 LBS | SYSTOLIC BLOOD PRESSURE: 105 MMHG | BODY MASS INDEX: 17.01 KG/M2 | HEIGHT: 63 IN

## 2021-08-20 DIAGNOSIS — S32.020A CLOSED COMPRESSION FRACTURE OF L2 LUMBAR VERTEBRA, INITIAL ENCOUNTER (H): Primary | ICD-10-CM

## 2021-08-20 PROCEDURE — 99213 OFFICE O/P EST LOW 20 MIN: CPT | Performed by: NURSE PRACTITIONER

## 2021-08-20 RX ORDER — MORPHINE SULFATE 15 MG/1
TABLET ORAL
Qty: 20 TABLET | Refills: 0 | Status: SHIPPED | OUTPATIENT
Start: 2021-08-20 | End: 2022-01-01

## 2021-08-20 RX ORDER — METHOCARBAMOL 500 MG/1
500 TABLET, FILM COATED ORAL 4 TIMES DAILY PRN
Qty: 30 TABLET | Refills: 1 | Status: SHIPPED | OUTPATIENT
Start: 2021-08-20 | End: 2022-01-01

## 2021-08-20 ASSESSMENT — MIFFLIN-ST. JEOR: SCORE: 838.23

## 2021-08-20 NOTE — PATIENT INSTRUCTIONS
Continue to wear the brace when out of bed or upright. You can try a muscle relaxant in addition to help with your pain.   If you want to pursue injections we need an MRI.       I will order home care for nursing and PT     We will see you back in four weeks with x-rays    You need to get your bone health treated.

## 2021-08-20 NOTE — PROGRESS NOTES
"Neurosurgery Progress Note  08/20/21    A/P: Shirley Phipps is a 86 year old female who is known to our service for previous compression fractures now with new/worsening L2 burst fracture. Not interested in further evaluation of her pain with MRI, continue brace. I recommended she have her bone health treated with more than calcium.   Plan:  1. Follow up four weeks with x-rays  2. TLSO when out of bed or upright   3. I wrote for a prescription for a week of pain pills but told her I would not prescribe further than that as she has not been interested in any thing other than morphine for her pain.     Neurosurgery Attending: Dr. Marc     S: Shirley is very tearful intermittently throughout visit, specifically when discussing pain medication and recommendations. She is not interested in other interventions for pain apart from morphine. I offered MRI lumbar spine and consideration for injections and she declined. She only requested morphine for as long as I would give it to her. She actually asks for something stronger but then tells me she tolerates nothing other than morphine. She tells me she has muscle relaxants at home despite her son reporting he has never picked up a similar medication. She has not treated her bone health despite numerous recommendations.     PMH: No interval change  PSH: No interval change    ROS: A full 14 point review of systems was otherwise completed and is negative aside from that mentioned above in the HPI    O:  /57   Pulse 89   Ht 5' 2.6\" (1.59 m)   Wt 96 lb (43.5 kg)   BMI 17.22 kg/m    General: Awake, alert, NAD  HEENT: AT/NC, EOMI, face symmetric, oral mucosa moist and pink  Heart: RRR  Lungs: Nonlabored respirations without accessory muscle use.  Abd: S, NT, ND  Neuro: Awake, alert, speech is clear and content is appropriate. MAEW x 4 with good strength LE. Sensation is intact to temperature and LT.   Musculoskeletal: Negative straight leg raise bl  Psych: " Appropriatemood and affect, pleasant, cooperative, alert and oriented x 3  Skin: no rashes, lesions    Labs: personally reviewed   Lab Results   Component Value Date     08/08/2021     08/07/2021    CO2 24 08/08/2021    CO2 20 08/07/2021    BUN 15 08/08/2021    BUN 19 08/07/2021     Recent Labs   Lab Test 08/08/21  0506 08/06/21  2254 07/17/21  1025 03/24/21  0712 03/23/21  2234 02/12/21  0407 02/09/21  0627 02/08/21  1004 10/22/20  0646 10/21/20  0736 10/20/20  1540 10/06/19  0838   WBC 4.5 8.7 12.0* 7.0 10.6 7.4 8.2 11.0 6.0 11.1* 17.2* 4.8   HGB 8.0* 9.0* 11.8 9.2* 10.5* 9.6* 9.2* 12.0 9.9* 10.1* 12.1 10.5*   HCT 25.8* 28.2* 37.3 29.1* 33.4* 29.1* 28.7* 37.8 31.2* 31.7* 37.9 33.1*   MCV 92 93 92 94 92 92 94 94 94 93 94 94   * 110* 114* 91* 105* 102* 71* 72* 95* 81* 91* 100*     CT 8/17/2021  I personally visualized all imaging and discussed with patient.   BONES: Inferior endplate height loss of the L2 vertebral body has progressed from approximately 10% to approximately 80%. There is slightly increased L2 retropulsion now measuring approximately 5 mm. A previously demonstrated lucent fracture plane   through the vertebral body persists. Severe T11 and L1 compression fractures have not appreciably changed in the interval. Mild chronic L4 inferior endplate height loss is also stable. The imaged portions of the sacrum and ildefonso appear intact. No   destructive bony lesions are demonstrated. Moderate L4-L5 facet arthropathy.                                                                   IMPRESSION:  1.  Marked progression of L2 vertebral body height loss since 08/01/2021, as above.  2.  Stable severe T11, L1 and L4 compression deformities.    Clara Mitchell, [facility]  O: [phone]    The author of this note documented a reason for not sharing it with the patient.

## 2021-08-20 NOTE — LETTER
"    8/20/2021         RE: Shirley Phipps  [address]  [facility] [address]        Dear Colleague,    Thank you for referring your patient, Shirley Phipps, to the [facility]. Please see a copy of my visit note below.    Neurosurgery Progress Note  08/20/21    A/P: Shirley Phipps is a 86 year old female who is known to our service for previous compression fractures now with new/worsening L2 burst fracture. Not interested in further evaluation of her pain with MRI, continue brace. I recommended she have her bone health treated with more than calcium.   Plan:  1. Follow up four weeks with x-rays  2. TLSO when out of bed or upright   3. I wrote for a prescription for a week of pain pills but told her I would not prescribe further than that as she has not been interested in any thing other than morphine for her pain.     Neurosurgery Attending: Dr. Marc     S: Shirley is very tearful intermittently throughout visit, specifically when discussing pain medication and recommendations. She is not interested in other interventions for pain apart from morphine. I offered MRI lumbar spine and consideration for injections and she declined. She only requested morphine for as long as I would give it to her. She actually asks for something stronger but then tells me she tolerates nothing other than morphine. She tells me she has muscle relaxants at home despite her son reporting he has never picked up a similar medication. She has not treated her bone health despite numerous recommendations.     PMH: No interval change  PSH: No interval change    ROS: A full 14 point review of systems was otherwise completed and is negative aside from that mentioned above in the HPI    O:  /57   Pulse 89   Ht 5' 2.6\" (1.59 m)   Wt 96 lb (43.5 kg)   BMI 17.22 kg/m    General: Awake, alert, NAD  HEENT: AT/NC, EOMI, face symmetric, oral mucosa moist and pink  Heart: RRR  Lungs: " Nonlabored respirations without accessory muscle use.  Abd: S, NT, ND  Neuro: Awake, alert, speech is clear and content is appropriate. MAEW x 4 with good strength LE. Sensation is intact to temperature and LT.   Musculoskeletal: Negative straight leg raise bl  Psych: Appropriatemood and affect, pleasant, cooperative, alert and oriented x 3  Skin: no rashes, lesions    Labs: personally reviewed   Lab Results   Component Value Date     08/08/2021     08/07/2021    CO2 24 08/08/2021    CO2 20 08/07/2021    BUN 15 08/08/2021    BUN 19 08/07/2021     Recent Labs   Lab Test 08/08/21  0506 08/06/21  2254 07/17/21  1025 03/24/21  0712 03/23/21  2234 02/12/21  0407 02/09/21  0627 02/08/21  1004 10/22/20  0646 10/21/20  0736 10/20/20  1540 10/06/19  0838   WBC 4.5 8.7 12.0* 7.0 10.6 7.4 8.2 11.0 6.0 11.1* 17.2* 4.8   HGB 8.0* 9.0* 11.8 9.2* 10.5* 9.6* 9.2* 12.0 9.9* 10.1* 12.1 10.5*   HCT 25.8* 28.2* 37.3 29.1* 33.4* 29.1* 28.7* 37.8 31.2* 31.7* 37.9 33.1*   MCV 92 93 92 94 92 92 94 94 94 93 94 94   * 110* 114* 91* 105* 102* 71* 72* 95* 81* 91* 100*     CT 8/17/2021  I personally visualized all imaging and discussed with patient.   BONES: Inferior endplate height loss of the L2 vertebral body has progressed from approximately 10% to approximately 80%. There is slightly increased L2 retropulsion now measuring approximately 5 mm. A previously demonstrated lucent fracture plane   through the vertebral body persists. Severe T11 and L1 compression fractures have not appreciably changed in the interval. Mild chronic L4 inferior endplate height loss is also stable. The imaged portions of the sacrum and ildefonso appear intact. No   destructive bony lesions are demonstrated. Moderate L4-L5 facet arthropathy.                                                                   IMPRESSION:  1.  Marked progression of L2 vertebral body height loss since 08/01/2021, as above.  2.  Stable severe T11, L1 and L4 compression  deformities.    Clara Mitchell, CNP  [facility]  O: [phone]    The author of this note documented a reason for not sharing it with the patient.                      Again, thank you for allowing me to participate in the care of your patient.        Sincerely,        Clara Mitchell NP

## 2021-08-24 ENCOUNTER — TELEPHONE (OUTPATIENT)
Dept: UROLOGY | Facility: CLINIC | Age: 86
End: 2021-08-24

## 2021-08-24 DIAGNOSIS — N20.1 CALCULUS OF URETER: Primary | ICD-10-CM

## 2021-08-24 DIAGNOSIS — R32 URINARY INCONTINENCE: ICD-10-CM

## 2021-08-24 NOTE — TELEPHONE ENCOUNTER
Patient calling stating that since her surgery she has had worsening incontinence.  She has no pain or fever.  Urine is clear yellow.  Will rule out infection and have patient drop off a urine specimen for testing.  Orders placed, per pt request, set up for a f/u telephone visit tomorrow.  Elena Almaguer RN

## 2021-08-25 ENCOUNTER — VIRTUAL VISIT (OUTPATIENT)
Dept: UROLOGY | Facility: CLINIC | Age: 86
End: 2021-08-25
Payer: COMMERCIAL

## 2021-08-25 ENCOUNTER — LAB (OUTPATIENT)
Dept: LAB | Facility: HOSPITAL | Age: 86
End: 2021-08-25
Payer: COMMERCIAL

## 2021-08-25 DIAGNOSIS — N20.1 CALCULUS OF URETER: Primary | ICD-10-CM

## 2021-08-25 DIAGNOSIS — R32 URINARY INCONTINENCE: ICD-10-CM

## 2021-08-25 DIAGNOSIS — R32 URINARY INCONTINENCE, UNSPECIFIED TYPE: ICD-10-CM

## 2021-08-25 DIAGNOSIS — N39.0 URINARY TRACT INFECTION WITHOUT HEMATURIA, SITE UNSPECIFIED: ICD-10-CM

## 2021-08-25 DIAGNOSIS — N20.1 CALCULUS OF URETER: ICD-10-CM

## 2021-08-25 LAB
ALBUMIN UR-MCNC: NEGATIVE MG/DL
APPEARANCE UR: CLEAR
BILIRUB UR QL STRIP: NEGATIVE
COLOR UR AUTO: ABNORMAL
GLUCOSE UR STRIP-MCNC: NEGATIVE MG/DL
HGB UR QL STRIP: NEGATIVE
KETONES UR STRIP-MCNC: NEGATIVE MG/DL
LEUKOCYTE ESTERASE UR QL STRIP: NEGATIVE
MUCOUS THREADS #/AREA URNS LPF: PRESENT /LPF
NITRATE UR QL: NEGATIVE
PH UR STRIP: 6.5 [PH] (ref 5–7)
RBC URINE: <1 /HPF
SP GR UR STRIP: 1.01 (ref 1–1.03)
UROBILINOGEN UR STRIP-MCNC: <2 MG/DL
WBC URINE: 1 /HPF

## 2021-08-25 PROCEDURE — 81001 URINALYSIS AUTO W/SCOPE: CPT

## 2021-08-25 PROCEDURE — 87086 URINE CULTURE/COLONY COUNT: CPT

## 2021-08-25 PROCEDURE — 99214 OFFICE O/P EST MOD 30 MIN: CPT | Mod: TEL | Performed by: PHYSICIAN ASSISTANT

## 2021-08-25 ASSESSMENT — PAIN SCALES - GENERAL: PAINLEVEL: NO PAIN (0)

## 2021-08-25 NOTE — PROGRESS NOTES
Assessment/Plan:    Assessment & Plan   Shirley was seen today for follow up.    Diagnoses and all orders for this visit:    Calculus of ureter    Urinary tract infection without hematuria, site unspecified    Urinary incontinence, unspecified type        Stone Management Plan  Stone Management 7/19/2021   Urinary Tract Infection Suspected Infection   Renal Colic Well controlled symptoms   Renal Failure No suspicion of renal failure   Current CT date 7/17/2021   Right sided stones? Yes   R Number of ureteral stones 1   R GSD of ureteral stones 5   R Location of ureteral stone Distal   R Number of kidney stones  No renal stones   R Hydronephrosis Moderate   R Stone Event New event   Diagnosis date 2/8/2021   Initial location of primary symptomatic stone Distal   Initial GSD of primary symptomatic stone 5   R Current Plan Clear   Clear rationale Associated treated infection   Left sided stones? Yes   L Number of kidney stones  1   L GSD of kidney stones < 2   L Hydronephrosis None   L Stone Event No current event   L Current Plan Observe   Observe rationale Limited stone burden with good prognosis for spontaneous passage     PLAN    86 yo frail F with hx of MDD, anxiety, aortic stenosis, chronic pain, and recurrent UTI's. Long standing, non-progressing obstructing right distal ureteral stone s/p ureteroscopy with intraop finding of spontaneous passage. Worsening urinary incontinence.    She is a low risk first time stone former and was educated on conservative strategies for risk reduction. She will follow up on a prn basis.    She will drop off UA/UC and will confirm no active infection. However, she would be served best to return to General Urology for long term management of incontinence.    Phone call duration: 26 minutes  30 minutes spent on the date of the encounter doing chart review, history and exam, documentation and further activities per the note.    Cindi Meredith PA-C  [facility]  [facility]    Subjective:     HPI  Ms. Shirley Phipps is a 86 year old  female who is being evaluated via a billable telephone visit by [facility] for late postoperative follow-up.     She returns status post Right ureteroscopic extraction for distal ureteral stone, which was found to have passed at time of intraoperative inspection. She has had no unanticipated events.     She called the other day stating issues with worsening urinary incontinence. She has been soaking through her pad 2-3 times a day. She notes it is worse with standing and moving. She was previously on a medication which she cant recall but not sure if it was helpful. She was to get an updated UA/UC ahead of her visit but was unable to secure a ride. She denies current symptoms of fever, chills, flank pain, nausea, vomiting, urinary frequency and dysuria.    Imaging was not performed today.    Urine culture from 8/7/21 was no growth.     ROS   Review of systems is negative except for HPI.    Past Medical History:   Diagnosis Date     Anemia      Aortic stenosis      Arthritis      Bicuspid aortic valve      Bladder infection      Cancer (H)     SQUAMOUS 2X     Closed burst fracture of lumbar vertebra (H) 03/26/2019    L4 BURST FX WITH RETROPULSION     Dyslipidemia      Fall 03/26/2019     GERD (gastroesophageal reflux disease)      History of transfusion     SHE DOESN'T THINK SHE HAD A TRANSFUSION REACTION     Hypertension      Past Surgical History:   Procedure Laterality Date     C RECONSTR TOTAL SHOULDER IMPLANT Left 12/26/2016    Procedure: LEFT REVERSE TOTAL SHOULDER ARTHROPLASTY;  Surgeon: Maurilio Marcelino MD;  Location: [facility] OR;  Service: Orthopedics     COMBINED CYSTOSCOPY, INSERT STENT URETER(S) Right 8/4/2021    Procedure: CYSTOURETEROSCOPY, RETROGRADE PYLEOGRAM RIGHT ;  Surgeon: Marbin Liu MD;  Location: [facility] OR     OTHER SURGICAL HISTORY Right     surgery for  prior wrist fracture     OTHER SURGICAL HISTORY Right     surgery for prior knee fracture     [facility]  12/25/2016            Current Outpatient Medications   Medication Sig Dispense Refill     diclofenac (VOLTAREN) 1 % topical gel Apply 2-4 g topically 3 times daily as needed for moderate pain Apply to knees and back       ferrous sulfate 325 (65 FE) MG tablet [FERROUS SULFATE 325 (65 FE) MG TABLET] Take 1 tablet (325 mg total) by mouth daily with breakfast. 90 tablet 0     furosemide (LASIX) 20 MG tablet Take 1 tablet (20 mg) by mouth daily 30 tablet 0     gabapentin (NEURONTIN) 300 MG capsule Take 300 mg by mouth 3 times daily       lidocaine 4 % patch Place 1 patch onto the skin daily as needed Apply to knee       methocarbamol (ROBAXIN) 500 MG tablet Take 1 tablet (500 mg) by mouth 4 times daily as needed for muscle spasms 30 tablet 1     morphine (MSIR) 15 MG IR tablet Take 0.5-1 tablet every 4-6 hours as needed for breakthrough pain 20 tablet 0     prednisoLONE acetate (PRED FORTE) 1 % ophthalmic suspension Place 1 drop into the right eye 2 times daily       senna-docusate (SENNOSIDES-DOCUSATE SODIUM) 8.6-50 mg tablet Take 1 tablet by mouth 2 times daily as needed for constipation          Allergies   Allergen Reactions     Allopurinol Other (See Comments)     Cefdinir Hives     Clindamycin Nausea     C.Diff     Codeine Nausea and Vomiting     Tolerates oral morphine.     Flagyl [Metronidazole] Angioedema     Sulfa (Sulfonamide Antibiotics) [Sulfa Drugs] Nausea     Canker sores     Tramadol Unknown     Upset stomach       Social History     Socioeconomic History     Marital status:      Spouse name: Not on file     Number of children: Not on file     Years of education: Not on file     Highest education level: Not on file   Occupational History     Not on file   Tobacco Use     Smoking status: Never Smoker     Smokeless tobacco: Never Used   Substance and Sexual Activity     Alcohol use: Yes      Alcohol/week: 7.0 standard drinks     Comment: Alcoholic Drinks/day: 1 shot of bee daily     Drug use: No     Sexual activity: Not on file   Other Topics Concern     Parent/sibling w/ CABG, MI or angioplasty before 65F 55M? Not Asked   Social History Narrative     Not on file     Social Determinants of Health     Financial Resource Strain:      Difficulty of Paying Living Expenses:    Food Insecurity:      Worried About Running Out of Food in the Last Year:      Ran Out of Food in the Last Year:    Transportation Needs:      Lack of Transportation (Medical):      Lack of Transportation (Non-Medical):    Physical Activity:      Days of Exercise per Week:      Minutes of Exercise per Session:    Stress:      Feeling of Stress :    Social Connections:      Frequency of Communication with Friends and Family:      Frequency of Social Gatherings with Friends and Family:      Attends [employer] Services:      Active Member of Clubs or Organizations:      Attends Club or Organization Meetings:      Marital Status:    Intimate Partner Violence:      Fear of Current or Ex-Partner:      Emotionally Abused:      Physically Abused:      Sexually Abused:        Family History   Problem Relation Age of Onset     Diabetes Type 2  Other      Breast Cancer Mother      Diabetes Father      Diabetes Brother        Objective:     No vitals or physical exam obtained due to virtual visit  Labs   Urinalysis POC (Office):  Nitrite Urine   Date Value Ref Range Status   08/07/2021 Negative Negative Final   07/17/2021 Negative Negative Final   02/08/2021 Negative Negative Final       Lab Urinalysis:  Nitrite Urine   Date Value Ref Range Status   08/07/2021 Negative Negative Final   07/17/2021 Negative Negative Final   02/08/2021 Negative Negative Final    and Acute Labs   Urine Culture    Culture   Date Value Ref Range Status   08/07/2021 Mixed Urogenital Fabi  Final   08/06/2021 No Growth  Final   08/06/2021 No Growth  Final

## 2021-08-25 NOTE — PROGRESS NOTES
Patient is roomed via telephone for a virtual visit.  Patient confirmed she is in the [facility].  Patient understands that this virtual visit is billable and agree to proceed with appointment.

## 2021-08-25 NOTE — PATIENT INSTRUCTIONS
Calcium Oxalate Stone Prevention Self Management    Drink more fluids:    Drinking more liquids is the best way you can help prevent future stones. Stones can form when substances in the urine are too concentrated. The more you drink, the more urine you will make. This means all substances in the urine will be less concentrated.    How much urine should I be producing?    The usual recommended daily urine production is about 2 to 3 quarts (8159-4380 ml). If you are producing more than 3 quarts of urine on a regular basis, it is possible to deplete important minerals stored in the body.    To measure the amount of urine you produce in a day, you can either:    Collect all urine in a container and measure at the end of the day     Use a measuring cup each time you urinate and add up the amounts at the end of the day     Observe    Color - Dark miracle urine is concentrated. Light straw color or lighter is dilute and desirable     Odor - Concentrated urine tends to smell stronger. Dilute urine is nearly odorless    Ways to increase your fluid intake    Increasing the amount of fluid you drink is effective for all types of kidney stones. While water is commonly recommended, all fluids are effective for increasing the amount of urine your body produces.    Focus on starting a lifelong habit, rather than a short-term solution.     Keep liquids on hand that you like. Crystal Light is a low calorie appropriate choice.    Drink out of larger glasses. You'll tend to drink more with each serving.     Have an additional glass of fluid with each meal.     Keep a water or drink bottle at work and fill it regularly.     *If you are prone to fluid retention, consult your doctor before making changes to your fluid habits.    Low Oxalate Diet:    Avoid excess amounts or daily consumption of these foods:    All nuts and nut products including peanuts, almonds, pecans, peanut butter, almond milk    Rhubarb    Chocolate    Soybeans and  soy products     Spinach    Wheat Germ    Beets    Maintain a normal calcium diet:    Researches have found that people with low calcium intakes tend to have more stones. Foods with high calcium content are acceptable and include:    Dairy products (including milk, cheese and yogurt)    Meat and fish    Enriched cereals    Dark green vegetables    What about calcium supplements?     Many people take calcium supplements, either on their own or as prescribed by a doctor. Research has indicated that calcium supplements do not usually pose a risk for stone formation.  Calcium citrate is a better choice for a supplement.    Avoid excess salt:    Salt (sodium chloride) is found in abundance in many foods. High sodium levels in the urine can interfere with the kidney's handling of calcium.     Tips for reducing the salt in your diet:    Don't use salt at the table    Reduce the salt used in food preparation. Try 1/2 teaspoon when recipes call for 1 teaspoon.    Use herbs and spices for flavoring instead of salt.    Avoid salty foods.    Check the label before you buy or use a product. Note sodium and portion size information.    Try to consume less than 2,000 mg/day. (1 teaspoon = 2,000 mg)    Foods with high sodium content include:    Processed meat (including luncheon meats, sausage)     Crackers     Instant cereal     Processed cheese     Canned soups     Chips and snack foods     Soy sauce    [facility] can respond to your questions or concerns 24 hours a day at [phone].

## 2021-08-26 ENCOUNTER — TELEPHONE (OUTPATIENT)
Dept: UROLOGY | Facility: CLINIC | Age: 86
End: 2021-08-26

## 2021-08-26 LAB — BACTERIA UR CULT: NO GROWTH

## 2021-08-26 NOTE — TELEPHONE ENCOUNTER
Message left for patient to call back to [facility] to update her on her negative urine culture result.  Elena Almaguer RN

## 2021-09-24 ENCOUNTER — HOSPITAL ENCOUNTER (OUTPATIENT)
Dept: RADIOLOGY | Facility: HOSPITAL | Age: 86
Discharge: HOME | End: 2021-09-24
Attending: NEUROLOGICAL SURGERY | Admitting: NEUROLOGICAL SURGERY
Payer: COMMERCIAL

## 2021-09-24 ENCOUNTER — OFFICE VISIT (OUTPATIENT)
Dept: ENDOCRINOLOGY | Facility: CLINIC | Age: 86
End: 2021-09-24
Attending: NURSE PRACTITIONER
Payer: COMMERCIAL

## 2021-09-24 VITALS
SYSTOLIC BLOOD PRESSURE: 128 MMHG | WEIGHT: 90 LBS | BODY MASS INDEX: 16.15 KG/M2 | DIASTOLIC BLOOD PRESSURE: 56 MMHG | HEART RATE: 84 BPM

## 2021-09-24 DIAGNOSIS — M80.00XA AGE-RELATED OSTEOPOROSIS WITH CURRENT PATHOLOGICAL FRACTURE, INITIAL ENCOUNTER: ICD-10-CM

## 2021-09-24 DIAGNOSIS — S32.020A CLOSED COMPRESSION FRACTURE OF L2 LUMBAR VERTEBRA, INITIAL ENCOUNTER (H): ICD-10-CM

## 2021-09-24 DIAGNOSIS — E83.52 HYPERCALCEMIA: ICD-10-CM

## 2021-09-24 DIAGNOSIS — N20.0 NEPHROLITHIASIS: ICD-10-CM

## 2021-09-24 DIAGNOSIS — S22.089A CLOSED T11 FRACTURE (H): ICD-10-CM

## 2021-09-24 DIAGNOSIS — K02.9 TOOTH DECAY: ICD-10-CM

## 2021-09-24 DIAGNOSIS — S22.080A COMPRESSION FRACTURE OF T11 VERTEBRA, INITIAL ENCOUNTER (H): ICD-10-CM

## 2021-09-24 DIAGNOSIS — S32.010A COMPRESSION FRACTURE OF L1 VERTEBRA, INITIAL ENCOUNTER (H): ICD-10-CM

## 2021-09-24 DIAGNOSIS — D64.9 ANEMIA, UNSPECIFIED TYPE: ICD-10-CM

## 2021-09-24 DIAGNOSIS — S32.040A CLOSED WEDGE COMPRESSION FRACTURE OF L4 VERTEBRA, INITIAL ENCOUNTER (H): ICD-10-CM

## 2021-09-24 PROCEDURE — 72080 X-RAY EXAM THORACOLMB 2/> VW: CPT

## 2021-09-24 PROCEDURE — 99205 OFFICE O/P NEW HI 60 MIN: CPT | Performed by: INTERNAL MEDICINE
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
## 2021-09-27 ENCOUNTER — LAB (OUTPATIENT)
Dept: LAB | Facility: CLINIC | Age: 86
End: 2021-09-27
Payer: COMMERCIAL

## 2021-09-27 DIAGNOSIS — S32.020A CLOSED COMPRESSION FRACTURE OF L2 LUMBAR VERTEBRA, INITIAL ENCOUNTER (H): ICD-10-CM

## 2021-09-27 DIAGNOSIS — D64.9 ANEMIA, UNSPECIFIED TYPE: ICD-10-CM

## 2021-09-27 DIAGNOSIS — M80.00XA AGE-RELATED OSTEOPOROSIS WITH CURRENT PATHOLOGICAL FRACTURE, INITIAL ENCOUNTER: ICD-10-CM

## 2021-09-27 LAB
ALBUMIN SERPL-MCNC: 4 G/DL (ref 3.5–5)
CALCIUM SERPL-MCNC: 10 MG/DL (ref 8.5–10.5)
CREAT SERPL-MCNC: 0.79 MG/DL (ref 0.6–1.1)
GFR SERPL CREATININE-BSD FRML MDRD: 68 ML/MIN/1.73M2
HOLD SPECIMEN: NORMAL
PHOSPHATE SERPL-MCNC: 2.7 MG/DL (ref 2.5–4.5)
PTH-INTACT SERPL-MCNC: 95 PG/ML (ref 10–86)
TOTAL PROTEIN SERUM FOR ELP: 6.8 G/DL (ref 6–8)
TSH SERPL DL<=0.005 MIU/L-ACNC: 0.94 UIU/ML (ref 0.3–5)

## 2021-09-27 PROCEDURE — 84165 PROTEIN E-PHORESIS SERUM: CPT

## 2021-09-27 PROCEDURE — 84155 ASSAY OF PROTEIN SERUM: CPT

## 2021-09-27 PROCEDURE — 83970 ASSAY OF PARATHORMONE: CPT

## 2021-09-27 PROCEDURE — 36415 COLL VENOUS BLD VENIPUNCTURE: CPT

## 2021-09-27 PROCEDURE — 82310 ASSAY OF CALCIUM: CPT

## 2021-09-27 PROCEDURE — 82040 ASSAY OF SERUM ALBUMIN: CPT

## 2021-09-27 PROCEDURE — 84100 ASSAY OF PHOSPHORUS: CPT

## 2021-09-27 PROCEDURE — 84443 ASSAY THYROID STIM HORMONE: CPT

## 2021-09-27 PROCEDURE — 82565 ASSAY OF CREATININE: CPT

## 2021-09-27 PROCEDURE — 83520 IMMUNOASSAY QUANT NOS NONAB: CPT

## 2021-09-27 PROCEDURE — 83883 ASSAY NEPHELOMETRY NOT SPEC: CPT

## 2021-09-27 PROCEDURE — 82306 VITAMIN D 25 HYDROXY: CPT

## 2021-09-28 ENCOUNTER — VIRTUAL VISIT (OUTPATIENT)
Dept: NEUROSURGERY | Facility: CLINIC | Age: 86
End: 2021-09-28
Payer: COMMERCIAL

## 2021-09-28 DIAGNOSIS — S32.020A CLOSED COMPRESSION FRACTURE OF L2 LUMBAR VERTEBRA, INITIAL ENCOUNTER (H): Primary | ICD-10-CM

## 2021-09-28 DIAGNOSIS — S22.089D CLOSED FRACTURE OF ELEVENTH THORACIC VERTEBRA WITH ROUTINE HEALING, UNSPECIFIED FRACTURE MORPHOLOGY, SUBSEQUENT ENCOUNTER: ICD-10-CM

## 2021-09-28 DIAGNOSIS — S32.049G CLOSED FRACTURE OF FOURTH LUMBAR VERTEBRA WITH DELAYED HEALING, UNSPECIFIED FRACTURE MORPHOLOGY, SUBSEQUENT ENCOUNTER: ICD-10-CM

## 2021-09-28 LAB — DEPRECATED CALCIDIOL+CALCIFEROL SERPL-MC: 36 UG/L (ref 30–80)

## 2021-09-28 PROCEDURE — 99213 OFFICE O/P EST LOW 20 MIN: CPT | Mod: 95 | Performed by: NURSE PRACTITIONER

## 2021-09-28 NOTE — LETTER
9/28/2021         RE: Shirley Phipps  [address]  [place] [facility] [address]        Dear Colleague,    Thank you for referring your patient, Shirley Phipps, to the [facility]. Please see a copy of my visit note below.    Neurosurgery telephone visit:    A/P:  Shirley Phipps is a 87yo F who is here for follow up telephone visit today with new lumbar Xrays. who is known to our service for previous chronic T11, L1 and L4 burst fractures who sustained a new L2 burst fracture after a twisting injury while walking. Presented to [facility] ED with back pain on 8/1/2021. Custom TLSO brace from prior injury was re-recommended. Dr Marc patient.    Shirley c/o continued stable yet persistent back pain with standing and transitions getting in/out of the car. No pain with lying down or sitting down at home. No leg pain, numbness, weakness, or change in bowel or bladder control. Able to walk around without difficulty using walker. She reports wearing her TLSO brace most often, but also getting up sometimes using what sounds like a corset brace which feels more supportive to her. She has been taking ibuprofen and gabapentin for the pain. Did not feel well after taking morphine, so she stopped taking it. Reviewed new lumbar XR which demonstrate stable T11 and L1 fractures, with interim mild additional height loss of L2 and L4 fractures. We discussed options. I offered her a referral for kyphoplasty in light of her ongoing pain, but she would like to defer this. Wants to continue brace. Saw pain mgmt at health [facility] 3 years ago, but did not feel their recommendations were helpful. Offered her a new referral for a second opinion from pain service within our system which she agreed to. She has seen endocrinology for bone treatment options, but osteoporosis agents are currently contraindicated for her d/t several abscessed molars. She is taking amoxicillin, has not set a date  for extractions. I encouraged her to do so since her infection is preventing her from achieving maximal bone health. We will see her back in 4wks with new upright XR, call sooner if questions or concerns.      Plan:  1. Follow up with NP/PA on Dr Marc clinic day in another four weeks with new lumbar x-rays.   2. TLSO when out of bed or head of bed upright (not corset brace).  3. Referred to pain management within our system for pain control  4. Call sooner if questions or concerns. Reviewed red flag symptoms with patient.    No exam performed since telephone call      Imaging:  Personally reviewed upright lumbar XR from 9/24 and compared them to prior CT lumbar spine.  T11 fracture with near collapse, stable  L1 burst fracture with near collapse, stable  L2 compression fracture with mild additional height loss, vertebral plana  L4 burst fracture with mild additional height loss      Yazmin Neal FNP-C  [facility] Neurosurgery  O. [phone]          Again, thank you for allowing me to participate in the care of your patient.        Sincerely,        CHERYL Avendano CNP

## 2021-09-28 NOTE — PROGRESS NOTES
Neurosurgery telephone visit:    A/P:  Shirley Phipps is a 85yo F who is here for follow up telephone visit today with new lumbar Xrays. who is known to our service for previous chronic T11, L1 and L4 burst fractures who sustained a new L2 burst fracture after a twisting injury while walking. Presented to [facility] ED with back pain on 8/1/2021. Custom TLSO brace from prior injury was re-recommended. Dr Marc patient.    Shirley c/o continued stable yet persistent back pain with standing and transitions getting in/out of the car. No pain with lying down or sitting down at home. No leg pain, numbness, weakness, or change in bowel or bladder control. Able to walk around without difficulty using walker. She reports wearing her TLSO brace most often, but also getting up sometimes using what sounds like a corset brace which feels more supportive to her. She has been taking ibuprofen and gabapentin for the pain. Did not feel well after taking morphine, so she stopped taking it. Reviewed new lumbar XR which demonstrate stable T11 and L1 fractures, with interim mild additional height loss of L2 and L4 fractures. We discussed options. I offered her a referral for kyphoplasty in light of her ongoing pain, but she would like to defer this. Wants to continue brace. Saw pain mgmt at health [facility] 3 years ago, but did not feel their recommendations were helpful. Offered her a new referral for a second opinion from pain service within our system which she agreed to. She has seen endocrinology for bone treatment options, but osteoporosis agents are currently contraindicated for her d/t several abscessed molars. She is taking amoxicillin, has not set a date for extractions. I encouraged her to do so since her infection is preventing her from achieving maximal bone health. We will see her back in 4wks with new upright XR, call sooner if questions or concerns.      Plan:  1. Follow up with NP/PA on Dr Marc clinic day in another  four weeks with new lumbar x-rays.   2. TLSO when out of bed or head of bed upright (not corset brace).  3. Referred to pain management within our system for pain control  4. Call sooner if questions or concerns. Reviewed red flag symptoms with patient.    No exam performed since telephone call      Imaging:  Personally reviewed upright lumbar XR from 9/24 and compared them to prior CT lumbar spine.  T11 fracture with near collapse, stable  L1 burst fracture with near collapse, stable  L2 compression fracture with mild additional height loss, vertebral plana  L4 burst fracture with mild additional height loss      Yazmin MARREROP-C  [facility]surgery  O. [phone]

## 2021-09-29 LAB
KAPPA LC FREE SER-MCNC: 3.1 MG/DL (ref 0.33–1.94)
KAPPA LC FREE/LAMBDA FREE SER NEPH: 1.48 {RATIO} (ref 0.26–1.65)
LAMBDA LC FREE SERPL-MCNC: 2.1 MG/DL (ref 0.57–2.63)

## 2021-09-30 LAB
ALBUMIN PERCENT: 66.9 % (ref 51–67)
ALBUMIN SERPL ELPH-MCNC: 4.5 G/DL (ref 3.2–4.7)
ALPHA 1 PERCENT: 2.4 % (ref 2–4)
ALPHA 2 PERCENT: 6.7 % (ref 5–13)
ALPHA1 GLOB SERPL ELPH-MCNC: 0.2 G/DL (ref 0.1–0.3)
ALPHA2 GLOB SERPL ELPH-MCNC: 0.5 G/DL (ref 0.4–0.9)
B-GLOBULIN SERPL ELPH-MCNC: 0.6 G/DL (ref 0.7–1.2)
BETA PERCENT: 8.5 % (ref 10–17)
GAMMA GLOB SERPL ELPH-MCNC: 1.1 G/DL (ref 0.6–1.4)
GAMMA GLOBULIN PERCENT: 15.5 % (ref 9–20)
PATH ICD:: ABNORMAL
PROT PATTERN SERPL ELPH-IMP: ABNORMAL
REVIEWING PATHOLOGIST: ABNORMAL
TOTAL PROTEIN SERUM FOR ELP (SYNCED VALUE): 6.8 G/DL

## 2021-10-04 ENCOUNTER — VIRTUAL VISIT (OUTPATIENT)
Dept: ENDOCRINOLOGY | Facility: CLINIC | Age: 86
End: 2021-10-04
Payer: COMMERCIAL

## 2021-10-04 DIAGNOSIS — S32.010D COMPRESSION FRACTURE OF L1 VERTEBRA WITH ROUTINE HEALING, SUBSEQUENT ENCOUNTER: ICD-10-CM

## 2021-10-04 DIAGNOSIS — N20.0 NEPHROLITHIASIS: ICD-10-CM

## 2021-10-04 DIAGNOSIS — S22.080D COMPRESSION FRACTURE OF T11 VERTEBRA WITH ROUTINE HEALING, SUBSEQUENT ENCOUNTER: ICD-10-CM

## 2021-10-04 DIAGNOSIS — D89.89 KAPPA LIGHT CHAIN DISEASE (H): ICD-10-CM

## 2021-10-04 DIAGNOSIS — S32.020D CLOSED WEDGE COMPRESSION FRACTURE OF L2 VERTEBRA WITH ROUTINE HEALING, SUBSEQUENT ENCOUNTER: ICD-10-CM

## 2021-10-04 DIAGNOSIS — M80.00XD AGE-RELATED OSTEOPOROSIS WITH CURRENT PATHOLOGICAL FRACTURE WITH ROUTINE HEALING, SUBSEQUENT ENCOUNTER: ICD-10-CM

## 2021-10-04 DIAGNOSIS — K02.9 TOOTH DECAY: ICD-10-CM

## 2021-10-04 DIAGNOSIS — S32.040S CLOSED COMPRESSION FRACTURE OF L4 LUMBAR VERTEBRA, SEQUELA: ICD-10-CM

## 2021-10-04 DIAGNOSIS — E83.52 HYPERCALCEMIA: ICD-10-CM

## 2021-10-04 DIAGNOSIS — D64.9 ANEMIA, UNSPECIFIED TYPE: ICD-10-CM

## 2021-10-04 PROCEDURE — 99215 OFFICE O/P EST HI 40 MIN: CPT | Performed by: INTERNAL MEDICINE

## 2021-10-04 PROCEDURE — 99207 E-CONSULT TO HEMATOLOGY (ADULT OUTPT PROVIDER TO SPECIALIST WRITTEN QUESTION & RESPONSE): CPT | Performed by: INTERNAL MEDICINE

## 2021-10-04 NOTE — PROGRESS NOTES
Phone visit    Start time 11:39, stop time 12:09; additional 15 minutes spent on the date of the encounter doing chart review, documentation and further activities as noted.     Provider location: [facility], [address] [place] [address] [address] [id number]    Patient location: patient home    Mode of transmission: phone    Subjective:    Established patient    Shirley Phipps is a 86 year old female who presents to review test results and discuss next steps of her osteoporosis care.    We reviewed the primary hyperparathyroidism dictation template. No prior H/N surgery or radiation. No prior BOYD. Her cousin has a parathyroid disorder, details not known. No other known FH of a parathyroid disorder, although note her son had a kidney stone at age 12 (he believes calcium phosphorous).        No use of lithium. She was on hydrochlorothiazide a few years ago and is not currently taking it.     Objective:    Phone visit.     Assessment/Plan:    # Severe osteoporosis with multiple prior low impact fractures including multiple vertebral compression fractures  # Intermittent hypercalcemia, ? Mild primary hyperparathyroidism  # Nephrolithiasis, non-obstructing and seen on imaging   # Need for upcoming dental extraction    She may have mild primary hyperparathyroidism, however this is unclear.  We discussed that generally if a patient has primary hyperparathyroidism and surgical indications such as osteoporosis and nephrolithiasis we would recommend surgery.  However given Shirley's age and comorbidities she has a strong preference to avoid surgery and I agree with this.  We discussed that if this is indeed mild primary hyperparathyroidism we will treat medically.  I would still like for her to complete the 24-hour urine collection to see the extent of her hypercalciuria.    I think the best pharmacologic therapy will be 1 year of romosozumab followed by either denosumab or Reclast.  Unfortunately we  cannot give romosozumab-aqqg or other antiresorptive therapy at this time because she needs to have several teeth extracted (risk of osteonecrosis of the jaw).  I also would not use Forteo or Tymlos in the setting of likely parathyroid autonomy. Would need teeth extraction with several months of healing and dentist approval before starting romosozumab or other anti-resorptive agents.     We also discussed optimization of calcium and vitamin D and she will send me a picture of the supplements that she takes so that we can clarify dosing.  She has increased her dietary calcium consumption since her last appointment.    # Elevated kappa free light chain   # Intermittent hypercalcemia, ? Mild primary hyperparathyroidism  # Normocytic anemia     Ordered as part of osteoporosis evaluation for a secondary cause. SPEP normal. Mildly elevated serum kappa free light chains with a normal kappa/lambda ratio.     UPEP and free light chains ordered and she will complete this.    Will also order a Hematology E-consult for next steps. Not sure the clinical significance (if any) of a mild isolated increase in kappa free light chains.

## 2021-10-04 NOTE — LETTER
10/4/2021         RE: Shirley Phipps  [address]  [facility] [facility] [address]        Dear Colleague,    Thank you for referring your patient, Shirley Phipps, to the [facility]. Please see a copy of my visit note below.    Phone visit    Start time 11:39, stop time 12:09; additional 15 minutes spent on the date of the encounter doing chart review, documentation and further activities as noted.     Provider location: [facility], [address] [place] [id number]    Patient location: patient home    Mode of transmission: phone    Subjective:    Established patient    Shirley Phipps is a 86 year old female who presents to review test results and discuss next steps of her osteoporosis care.    We reviewed the primary hyperparathyroidism dictation template. No prior H/N surgery or radiation. No prior BOYD. Her cousin has a parathyroid disorder, details not known. No other known FH of a parathyroid disorder, although note her son had a kidney stone at age 12 (he believes calcium phosphorous).        No use of lithium. She was on hydrochlorothiazide a few years ago and is not currently taking it.     Objective:    Phone visit.     Assessment/Plan:    # Severe osteoporosis with multiple prior low impact fractures including multiple vertebral compression fractures  # Intermittent hypercalcemia, ? Mild primary hyperparathyroidism  # Nephrolithiasis, non-obstructing and seen on imaging   # Need for upcoming dental extraction    She may have mild primary hyperparathyroidism, however this is unclear.  We discussed that generally if a patient has primary hyperparathyroidism and surgical indications such as osteoporosis and nephrolithiasis we would recommend surgery.  However given Shirley's age and comorbidities she has a strong preference to avoid surgery and I agree with this.  We discussed that if this is indeed mild primary hyperparathyroidism we will  treat medically.  I would still like for her to complete the 24-hour urine collection to see the extent of her hypercalciuria.    I think the best pharmacologic therapy will be 1 year of romosozumab followed by either denosumab or Reclast.  Unfortunately we cannot give romosozumab-aqqg or other antiresorptive therapy at this time because she needs to have several teeth extracted (risk of osteonecrosis of the jaw).  I also would not use Forteo or Tymlos in the setting of likely parathyroid autonomy. Would need teeth extraction with several months of healing and dentist approval before starting romosozumab or other anti-resorptive agents.     We also discussed optimization of calcium and vitamin D and she will send me a picture of the supplements that she takes so that we can clarify dosing.  She has increased her dietary calcium consumption since her last appointment.    # Elevated kappa free light chain   # Intermittent hypercalcemia, ? Mild primary hyperparathyroidism  # Normocytic anemia     Ordered as part of osteoporosis evaluation for a secondary cause. SPEP normal. Mildly elevated serum kappa free light chains with a normal kappa/lambda ratio.     UPEP and free light chains ordered and she will complete this.    Will also order a Hematology E-consult for next steps. Not sure the clinical significance (if any) of a mild isolated increase in kappa free light chains.       Again, thank you for allowing me to participate in the care of your patient.        Sincerely,        Ashok Reyes MD

## 2021-10-05 LAB — TRYPTASE SERPL-MCNC: 6.7 UG/L

## 2021-10-06 ENCOUNTER — TELEPHONE (OUTPATIENT)
Dept: ENDOCRINOLOGY | Facility: CLINIC | Age: 86
End: 2021-10-06

## 2021-10-06 ENCOUNTER — E-CONSULT (OUTPATIENT)
Dept: HEMATOLOGY | Facility: CLINIC | Age: 86
End: 2021-10-06
Payer: COMMERCIAL

## 2021-10-06 PROCEDURE — 99451 NTRPROF PH1/NTRNET/EHR 5/>: CPT | Performed by: INTERNAL MEDICINE

## 2021-10-06 NOTE — TELEPHONE ENCOUNTER
I called the pt, she had family over, who inform me that the pt is taking 2-3 gummy's per day of vitamin D, the dose is 2 gummy's, which =2,000 international unit(s). The pt is wondering if this dose is ok. I infored the pt that she is normal, but on the low end of normal. I informed the pt that she likely will want to always take 3 gummy's during the winter months and in the summer months could take 2. The pt was also wondering about her calcium level, which is also normal, however on the high side of normal. The pt will continue on her current dose of calcium. The pt was wondering about her hematology referral. I do not see a referral was placed so I will discuss this with the MD and contact her back.

## 2021-10-06 NOTE — TELEPHONE ENCOUNTER
Patient called. Dr. Reyes wanted her to send pictures of the bottles for both the Vit D3 and Calcium, but she isn't able to get that uploaded in her My Chart account. She can't really see the dose of the Vit D3, but can see that the Calcium and she  is taking 500 mg 2x a day.     Shirley @ [phone]

## 2021-10-06 NOTE — LETTER
Rosa Thomson,   Please review the below information and recommendations.  Thank you,  Areli BELL RN, BSN, PHN/Ashok Reyes MD    E-Consult has been accepted.     Interprofessional consultation requested by:  Ashok Reyes MD      Clinical Question/Purpose: MY CLINICAL QUESTION IS: isolated mild elevation in kappa free light chain. See my note from 10/4/2021 for details. Has also had intermittent mild hypercalcemia (be have some parathyroid autonomy) and normocytic anemia. Monoclonal gammopathy screen ordered as part of workup for severe osteoporosis with multiple vertebral compression fractures. Any clinical significance for the isolated mild elevation in kappa free light chains and next steps?      Patient assessment and information reviewed:  Chart and labs reviewed.     Testing performed on September 27, 2021 shows very mildly elevated serum kappa free light chain, and free lambda light chain toward the upper end of the normal range.  The kappa lambda ratio is normal.     Serum protein electrophoresis shows no evidence of monoclonal protein.     The pattern of elevated kappa and/or lambda light chains but with a normal ratio typically indicates decreased renal excretion of light chains.  Although her serum creatinine is normal, given her age of 86 years, suspect that decreased renal excretion of light chains is the explanation here.     Recommendations:  I would not pursue additional extensive work-up.  Could repeat the serum protein electrophoresis and serum free light chain analysis in 4 to 6 months.         The recommendations provided in this E-Consult are based on the clinical data available to me at this time, and are furnished without the benefit of a comprehensive in-person or virtual patient evaluation, Any new clinical issues or changes in patient status since the filing of this E-Consult will need to be taken into account when assessing these recommendations. Please contact me if you  have further questions.     My total time spent reviewing clinical information and formulating assessment was 10 minutes.     Report sent automatically to requesting provider once signed.      Charge codes - [id number] (5+ minutes) or 39S0353 (No charge code)     Simone Darby MD

## 2021-10-07 NOTE — PROGRESS NOTES
ALL SMARTFIELDS MUST BE COMPLETED FOR PATIENT CARE AND BILLING    10/6/2021     E-Consult has been accepted.    Interprofessional consultation requested by:  Ashok Reyes MD      Clinical Question/Purpose: MY CLINICAL QUESTION IS: isolated mild elevation in kappa free light chain. See my note from 10/4/2021 for details. Has also had intermittent mild hypercalcemia (be have some parathyroid autonomy) and normocytic anemia. Monoclonal gammopathy screen ordered as part of workup for severe osteoporosis with multiple vertebral compression fractures. Any clinical significance for the isolated mild elevation in kappa free light chains and next steps?     Patient assessment and information reviewed:  Chart and labs reviewed.    Testing performed on September 27, 2021 shows very mildly elevated serum kappa free light chain, and free lambda light chain toward the upper end of the normal range.  The kappa lambda ratio is normal.    Serum protein electrophoresis shows no evidence of monoclonal protein.    The pattern of elevated kappa and/or lambda light chains but with a normal ratio typically indicates decreased renal excretion of light chains.  Although her serum creatinine is normal, given her age of 86 years, suspect that decreased renal excretion of light chains is the explanation here.    Recommendations:  I would not pursue additional extensive work-up.  Could repeat the serum protein electrophoresis and serum free light chain analysis in 4 to 6 months.       The recommendations provided in this E-Consult are based on the clinical data available to me at this time, and are furnished without the benefit of a comprehensive in-person or virtual patient evaluation, Any new clinical issues or changes in patient status since the filing of this E-Consult will need to be taken into account when assessing these recommendations. Please contact me if you have further questions.    My total time spent reviewing clinical  information and formulating assessment was 10 minutes.    Report sent automatically to requesting provider once signed.     Charge codes - [id number] (5+ minutes) or 53Z2001 (No charge code)    Simone Darby MD

## 2021-10-07 NOTE — TELEPHONE ENCOUNTER
I called LM for the pt to c/b to inform that Dr Reyes would like her to take 3 gummy's allyear round of the vitamin D and would like her to take only one tab of calcium if she is getting about 500 mg of calcium through her diet. Dr Reyes would also like the pt to contact us after she sees her dentist and let us know if she will be having an extraction or not. I will have to investigate the hematology referral some more and let the pt know what I find out.

## 2021-10-07 NOTE — TELEPHONE ENCOUNTER
Pt called back and I informed of the below. I also informed of Dr Reyes's my chart message. Pt understands. I will send a message to her PCP.

## 2021-10-23 ENCOUNTER — HEALTH MAINTENANCE LETTER (OUTPATIENT)
Age: 86
End: 2021-10-23

## 2021-10-28 ENCOUNTER — HOSPITAL ENCOUNTER (OUTPATIENT)
Dept: RADIOLOGY | Facility: HOSPITAL | Age: 86
Discharge: HOME | End: 2021-10-28
Attending: NURSE PRACTITIONER | Admitting: NURSE PRACTITIONER
Payer: COMMERCIAL

## 2021-10-28 DIAGNOSIS — S32.020A CLOSED COMPRESSION FRACTURE OF L2 LUMBAR VERTEBRA, INITIAL ENCOUNTER (H): ICD-10-CM

## 2021-10-28 DIAGNOSIS — S32.049G CLOSED FRACTURE OF FOURTH LUMBAR VERTEBRA WITH DELAYED HEALING, UNSPECIFIED FRACTURE MORPHOLOGY, SUBSEQUENT ENCOUNTER: ICD-10-CM

## 2021-10-28 PROCEDURE — 72100 X-RAY EXAM L-S SPINE 2/3 VWS: CPT

## 2021-11-04 ENCOUNTER — OFFICE VISIT (OUTPATIENT)
Dept: ANESTHESIOLOGY | Facility: CLINIC | Age: 86
End: 2021-11-04
Payer: COMMERCIAL

## 2021-11-04 VITALS — HEART RATE: 79 BPM | SYSTOLIC BLOOD PRESSURE: 153 MMHG | DIASTOLIC BLOOD PRESSURE: 80 MMHG | OXYGEN SATURATION: 98 %

## 2021-11-04 DIAGNOSIS — M16.11 PRIMARY OSTEOARTHRITIS OF RIGHT HIP: Primary | ICD-10-CM

## 2021-11-04 DIAGNOSIS — S32.049G CLOSED FRACTURE OF FOURTH LUMBAR VERTEBRA WITH DELAYED HEALING, UNSPECIFIED FRACTURE MORPHOLOGY, SUBSEQUENT ENCOUNTER: ICD-10-CM

## 2021-11-04 DIAGNOSIS — S32.020A CLOSED COMPRESSION FRACTURE OF L2 LUMBAR VERTEBRA, INITIAL ENCOUNTER (H): ICD-10-CM

## 2021-11-04 DIAGNOSIS — S22.089D CLOSED FRACTURE OF ELEVENTH THORACIC VERTEBRA WITH ROUTINE HEALING, UNSPECIFIED FRACTURE MORPHOLOGY, SUBSEQUENT ENCOUNTER: ICD-10-CM

## 2021-11-04 DIAGNOSIS — M17.11 PRIMARY OSTEOARTHRITIS OF RIGHT KNEE: ICD-10-CM

## 2021-11-04 PROCEDURE — 99204 OFFICE O/P NEW MOD 45 MIN: CPT | Mod: GC | Performed by: ANESTHESIOLOGY

## 2021-11-04 ASSESSMENT — PAIN SCALES - GENERAL: PAINLEVEL: WORST PAIN (10)

## 2021-11-04 NOTE — PATIENT INSTRUCTIONS
Referrals:    Orthotics referral.       Procedures:    Call to schedule your procedure: [phone], option #2    Hip and Knee injection- [facility] location for procedure. Dr. Flores will send a message to providers at this location.    Your pre-procedure instructions are below, please call our clinic if you have any questions.      Recommended Follow up:      Follow up as needed.       Please call [phone], option #1 to schedule your clinic appointment if you don't already have an appointment scheduled.        To speak with a nurse, schedule/reschedule/cancel a clinic appointment, or request a medication refill call: [phone], option #1.    You can also reach us by Anda: [email or web address]

## 2021-11-04 NOTE — LETTER
11/4/2021       RE: Shirley Phipps  [address]  [facility] [address]     Dear Colleague,    Thank you for referring your patient, Shirley Phipps, to the [facility] FOR [facility]IVE [facility]NAGEMENT [place] at [facility]. Please see a copy of my visit note below.    ATTENDING ATTESTATION: I have seen and evaluated the patient with the resident physician and agree with the history, examination, assessment, and plan as detailed below.  The patient is a 86 year old female with PMHx remarkable for anemia, arthritis, HTN, GERD, aortic stenosis, and chronic back pain who presents for evaluation of right hip pain, right knee pain, and back pain.  In the setting of subacute vertebral fracture and ongoing bracing we will defer any additional management of back pain at this time.  Her most impactful pain currently is from her right hip and right knee, both of which demonstrate significant osteoarthritis on imaging.  We will start with intraarticular steroid injection, however if the patient fails to demonstrate benefit of significant duration, radiofrequency ablation or stimulation therapies could be considered, particularly in the setting of osteopenia to reduce steroid exposure.  We discussed additional complimentary therapies and pharmacotherapies, however the patient would like to defer these at this time.  She would be amenable to referral to orthotics for evaluation for shoe orthotics as she reports a history of leg length discrepancy.  Plan for follow up 2-4 weeks after injections to assess for benefit and future treatment planning.    Ashok Archuleta MD    Department of Anesthesiology  Pain Management Division    VISIT RECOMMENDATIONS:  1. Order placed for right hip and right knee steroid injections  2. Order placed for orthotics for evaluation for shoe lifts for suspected leg length discrepancy  3. Recommend follow up  "for brace optimization of mobility vs support    COMPREHENSIVE PAIN CLINIC INITIAL EVALUATION    I had the pleasure of meeting Ms. Shirley Phipps on 11/4/2021 in the [facility] in consult for Dr. Neal with regards to her back, right hip and right knee pain    Subjective:  The patient is a 86 year old female with past medical history of anemia, arthritis, HTN, GERD, aortic stenosis, and chronic back pain who presents for evaluation of back, right knee and and right hip pain. She has severe osteopenia which cannot be treated at this time due to active tooth abscesses. This has resulted in several vertebral fractures, including a lumbar burst fracture from \"twisting\" when walking. Currently in a TSLO brace. Does not wish to have any spine surgery at this time. Pt states her back pain has improved mildly since the most recent fracture. Currently she feels her right hip pain is her most limiting factor, although her right knee is also painful. She's had a steroid injection in each joint before, with her hip having a mild response and her knee not responding, although she notes she twisted her knee right after receiving that injection. She takes gabapentin and ibuprofen for pain which she feels helps her pain. Unable to tolerate opioids  Current ambulation is with a walker, this has been her baseline. Lives in a [facility] by herself with a son close by    Shirley Phipps has been seen at a pain clinic in the past.     Current treatments:  Gabapentin and ibuprofen    Previous medication treatments included:  Anti-convulsants: no  Muscle relaxors: no  Anti-depressants: no  Acetaminophen/NSAIDs: active  Topicals: lidocaine patches  Headache abortives: no  Headache prophylactics: no  Opioids: morphine    Other treatments have included:  Physical therapy: yes, has not helped  Pain Psychology: no  Chiropractic: no  Acupuncture: no  TENs Unit: no  Injections: right hip and knee steroid  Surgeries: no    Past " Medical History:  Medical history reviewed.   Past Medical History:   Diagnosis Date     Anemia      Aortic stenosis      Arthritis      Bicuspid aortic valve      Bladder infection      Cancer (H)     SQUAMOUS 2X     Closed burst fracture of lumbar vertebra (H) 03/26/2019    L4 BURST FX WITH RETROPULSION     Dyslipidemia      Fall 03/26/2019     GERD (gastroesophageal reflux disease)      History of transfusion     SHE DOESN'T THINK SHE HAD A TRANSFUSION REACTION     Hypertension       Patient Active Problem List   Diagnosis     Calculus of ureter     E. coli UTI     Hypoxia     Fever, unspecified fever cause     Age-related osteoporosis with current pathological fracture with routine healing, subsequent encounter     Closed wedge compression fracture of L2 vertebra with routine healing, subsequent encounter     Anemia, unspecified type     Compression fracture of T11 vertebra with routine healing, subsequent encounter     Compression fracture of L1 vertebra with routine healing, subsequent encounter     Nephrolithiasis     Hypercalcemia     Tooth decay     Closed compression fracture of L4 lumbar vertebra, sequela     Past Surgical History:  Pertinent surgical history reviewed.   Past Surgical History:   Procedure Laterality Date     C RECONSTR TOTAL SHOULDER IMPLANT Left 12/26/2016    Procedure: LEFT REVERSE TOTAL SHOULDER ARTHROPLASTY;  Surgeon: Maurilio Marcelino MD;  [place] [facility] [place]  Service: Orthopedics     COMBINED CYSTOSCOPY, INSERT STENT URETER(S) Right 8/4/2021    Procedure: CYSTOURETEROSCOPY, RETROGRADE PYLEOGRAM RIGHT ;  Surgeon: Marbin Liu MD;  Location: [facility] [place]     OTHER SURGICAL HISTORY Right     surgery for prior wrist fracture     OTHER SURGICAL HISTORY Right     surgery for prior knee fracture     [facility]  12/25/2016           Medications: Pertinent medications reviewed and updated  Current Outpatient Medications   Medication Sig Dispense Refill     diclofenac (VOLTAREN) 1  % topical gel Apply 2-4 g topically 3 times daily as needed for moderate pain Apply to knees and back        ferrous sulfate 325 (65 FE) MG tablet [FERROUS SULFATE 325 (65 FE) MG TABLET] Take 1 tablet (325 mg total) by mouth daily with breakfast. 90 tablet 0     furosemide (LASIX) 20 MG tablet Take 1 tablet (20 mg) by mouth daily 30 tablet 0     gabapentin (NEURONTIN) 300 MG capsule Take 300 mg by mouth 3 times daily        IBUPROFEN PO        lidocaine 4 % patch Place 1 patch onto the skin daily as needed Apply to knee       methocarbamol (ROBAXIN) 500 MG tablet Take 1 tablet (500 mg) by mouth 4 times daily as needed for muscle spasms 30 tablet 1     morphine (MSIR) 15 MG IR tablet Take 0.5-1 tablet every 4-6 hours as needed for breakthrough pain 20 tablet 0     prednisoLONE acetate (PRED FORTE) 1 % ophthalmic suspension Place 1 drop into the right eye 2 times daily        senna-docusate (SENNOSIDES-DOCUSATE SODIUM) 8.6-50 mg tablet Take 1 tablet by mouth 2 times daily as needed for constipation        [place] and [place] Prescription Monitoring Program reviewed     Allergies: Pertinent allergies reviewed     Allergies   Allergen Reactions     Allopurinol Other (See Comments)     Cefdinir Hives     Clindamycin Nausea     C.Diff     Codeine Nausea and Vomiting     Tolerates oral morphine.     Flagyl [Metronidazole] Angioedema     Sulfa (Sulfonamide Antibiotics) [Sulfa Drugs] Nausea     Canker sores     Tramadol Unknown     Upset stomach     Family History:   family history includes Breast Cancer in her mother; Diabetes in her brother and father; Diabetes Type 2  in an other family member.    Social history:   Social History     Socioeconomic History     Marital status:      Spouse name: Not on file     Number of children: Not on file     Years of education: Not on file     Highest education level: Not on file   Occupational History     Not on file   Tobacco Use     Smoking status: Never Smoker     Smokeless tobacco:  Never Used   Substance and Sexual Activity     Alcohol use: Yes     Alcohol/week: 7.0 standard drinks     Comment: Alcoholic Drinks/day: 1 shot of bee daily     Drug use: No     Sexual activity: Not on file   Other Topics Concern     Parent/sibling w/ CABG, MI or angioplasty before 65F 55M? Not Asked   Social History Narrative     Not on file     Social Determinants of Health     Financial Resource Strain:      Difficulty of Paying Living Expenses:    Food Insecurity:      Worried About Running Out of Food in the Last Year:      Ran Out of Food in the Last Year:    Transportation Needs:      Lack of Transportation (Medical):      Lack of Transportation (Non-Medical):    Physical Activity:      Days of Exercise per Week:      Minutes of Exercise per Session:    Stress:      Feeling of Stress :    Social Connections:      Frequency of Communication with Friends and Family:      Frequency of Social Gatherings with Friends and Family:      Attends [employer] Services:      Active Member of Clubs or Organizations:      Attends Club or Organization Meetings:      Marital Status:    Intimate Partner Violence:      Fear of Current or Ex-Partner:      Emotionally Abused:      Physically Abused:      Sexually Abused:      Social History     Social History Narrative     Not on file     She lives in a [facility] by herself. She is not currently working.  Smoking: no. Alcohol: no. Street drugs: no.     Review of Systems:  ROS   The 14 system ROS was reviewed from the intake questionnaire; results listed at end of note.    Physical Exam:  There were no vitals taken for this visit.    Physical Exam   Constitutional: She is oriented to person, place, and time.  She appears well-developed and well-nourished. She is not in acute distress.   HENT:     Head: Normocephalic and atraumatic.     Eyes: Pupils are equal, round, and reactive to light. EOM are normal. No scleral icterus.     Neck: Normal range of motion. Neck supple.    Cardiovascular: Normal rate and regular rhythm.   Pulmonary/Chest:  NWOB. No respiratory distress.   Abdominal: deferred  Genitourinary: deferred  Neurological: She is alert and oriented to person, place, and time. CN II-XII grossly intact, coordination grossly normal.  Skin: Skin is warm and dry. She is not diaphoretic.   Psychiatric: She has a normal mood and affect. Her behavior is normal. Judgment and thought content normal.  MSK:limited exam due to mobility and TSLO brace. Pt guards against right thigh external/internal rotation heavily. Pain with knee ROM mostly focused to lateral anterior portion. Crepitus noticed with minimal movement in the knee. Normal sensation    Imaging:  EXAM: CT LUMBAR SPINE W/O CONTRAST  LOCATION: [facility]  DATE/TIME: 8/17/2021 3:39 PM     INDICATION: Closed compression fracture of L2 lumbar vertebra, initial encounter (H).  COMPARISON: Radiograph 08/01/2021. CT 08/01/2021.  TECHNIQUE: Routine CT Lumbar Spine without IV contrast. Multiplanar reformats. Dose reduction techniques were used.      FINDINGS:  NOMENCLATURE: 5 lumbar type vertebral bodies.     BONES: Inferior endplate height loss of the L2 vertebral body has progressed from approximately 10% to approximately 80%. There is slightly increased L2 retropulsion now measuring approximately 5 mm. A previously demonstrated lucent fracture plane   through the vertebral body persists. Severe T11 and L1 compression fractures have not appreciably changed in the interval. Mild chronic L4 inferior endplate height loss is also stable. The imaged portions of the sacrum and ildefonso appear intact. No   destructive bony lesions are demonstrated. Moderate L4-L5 facet arthropathy.     ALIGNMENT: There is again locally accentuated kyphosis centered at T12-L1, now measuring approximately 23 degrees. There is broad-based dextroconvex curvature centered at L2-L3. There is mild rightward lateral listhesis of L4 on L5 and of  L3 on L4.     DISCS: At L3-L4, there is a broad-based disc-osteophyte complex. At L4-L5, there is severe intervertebral disc height loss with broad-based disc-osteophyte complex. At L5-S1, there is mild intervertebral disc height loss with shallow bulge.     SPINAL CANAL: Mild spinal canal stenosis related to L2 vertebral body retropulsion. Moderate L4-L5 and mild L3-L4 spinal canal stenosis, similar to prior.     NEURAL FORAMINA: Moderate bilateral L4-L5 neural foraminal stenosis.     PARASPINAL SOFT TISSUES: Unremarkable.     ABDOMINAL CAVITY: The visualized structures demonstrate no definite acute abnormality.                                                                      IMPRESSION:  1.  Marked progression of L2 vertebral body height loss since 08/01/2021, as above.  2.  Stable severe T11, L1 and L4 compression deformities.  3.  At L4-L5, there is again moderate stenosis of the spinal canal and neural foramina.    EMG:  None available for review    Laboratory Results:  None available for review    Assessment:    The patient is a 86 year old female with PMHx of osteopenia, several vertebral fractures and right hip and knee arthritis who was referred by Dr. Neal for evaluation of back, right hip and knee pain. Her back pain is mildly improved with bracing following her fracture and she is following closely with her specialist and wishes to maintain her brace use at this time. She feels her ambulation is mostly limited by her hip pain. Exam is consistent with joint  pain and this is supported by recent imaging. She also endorses right knee pain which is also likely degenerative in nature. Given this, it would be most beneficial to target her hip and knee pain given the complex nature of her spine.   Would recommend starting with steroid injections and depending on her response could move towards targeted nerve treatments.  She is not interested in additional pharmacotherapy or complimentary therapies at  this time, however these remain options for future treatment.    Visit Diagnoses:  1. Osteopenia  2. Right hip arthritis  3. Right knee arthritis  4. Chronic pain  5. Lumbar burst fracture    Plan:  Patient education:    We discussed with the patient the likely mechanisms underlying her pain.  In her case, this includes known fractures and arthritis which are likely contributing to her overall pain picture.  In addition we discussed the role of central and peripheral pain processing in the development and propagation of chronic pain as a disorder as well as the importance of multidisciplinary treatment and multimodal medication therapy.    Work up:    No further work up at this time    Referrals:    none    Medications:    No changes    Therapies:    Recommend PT in home    Interventions:    Schedule right hip and knee steroid injection    Follow up: 2-3 weeks post knee and hip injection, assess for further interventions at this time    Thank you for the consult.    Henry Wiley DO [facility]  Department of Anesthesiology      Again, thank you for allowing me to participate in the care of your patient.      Sincerely,    Ashok Flores MD

## 2021-11-04 NOTE — NURSING NOTE
Patient presents with:  Consult: UMP NEW, RM 12, patient reports, 10/10 pain in her back, hip knee, teeth, face      Worst Pain (10)     Pain Medications     Opioid Agonists Refills Start End     morphine (MSIR) 15 MG IR tablet    0 8/20/2021     Sig: Take 0.5-1 tablet every 4-6 hours as needed for breakthrough pain    Class: E-Prescribe    Earliest Fill Date: 8/20/2021          What medications are you using for pain?   Ibuprofen, gabapentin, robaxin, lidocaine    (New patients only) Have you been seen by another pain clinic/ provider? N/A      Richard Brown, EMT

## 2021-11-04 NOTE — PROGRESS NOTES
ATTENDING ATTESTATION: I have seen and evaluated the patient with the resident physician and agree with the history, examination, assessment, and plan as detailed below.  The patient is a 86 year old female with PMHx remarkable for anemia, arthritis, HTN, GERD, aortic stenosis, and chronic back pain who presents for evaluation of right hip pain, right knee pain, and back pain.  In the setting of subacute vertebral fracture and ongoing bracing we will defer any additional management of back pain at this time.  Her most impactful pain currently is from her right hip and right knee, both of which demonstrate significant osteoarthritis on imaging.  We will start with intraarticular steroid injection, however if the patient fails to demonstrate benefit of significant duration, radiofrequency ablation or stimulation therapies could be considered, particularly in the setting of osteopenia to reduce steroid exposure.  We discussed additional complimentary therapies and pharmacotherapies, however the patient would like to defer these at this time.  She would be amenable to referral to orthotics for evaluation for shoe orthotics as she reports a history of leg length discrepancy.  Plan for follow up 2-4 weeks after injections to assess for benefit and future treatment planning.    Ashok Archuleta MD    Department of Anesthesiology  Pain Management Division    VISIT RECOMMENDATIONS:  1. Order placed for right hip and right knee steroid injections  2. Order placed for orthotics for evaluation for shoe lifts for suspected leg length discrepancy  3. Recommend follow up for brace optimization of mobility vs support    COMPREHENSIVE PAIN CLINIC INITIAL EVALUATION    I had the pleasure of meeting Ms. Shirley Phipps on 11/4/2021 in the [facility] in consult for Dr. Neal with regards to her back, right hip and right knee pain    Subjective:  The patient is a 86 year old female with past medical  "history of anemia, arthritis, HTN, GERD, aortic stenosis, and chronic back pain who presents for evaluation of back, right knee and and right hip pain. She has severe osteopenia which cannot be treated at this time due to active tooth abscesses. This has resulted in several vertebral fractures, including a lumbar burst fracture from \"twisting\" when walking. Currently in a TSLO brace. Does not wish to have any spine surgery at this time. Pt states her back pain has improved mildly since the most recent fracture. Currently she feels her right hip pain is her most limiting factor, although her right knee is also painful. She's had a steroid injection in each joint before, with her hip having a mild response and her knee not responding, although she notes she twisted her knee right after receiving that injection. She takes gabapentin and ibuprofen for pain which she feels helps her pain. Unable to tolerate opioids  Current ambulation is with a walker, this has been her baseline. Lives in a [facility] by herself with a son close by    Shirley Phipps has been seen at a pain clinic in the past.     Current treatments:  Gabapentin and ibuprofen    Previous medication treatments included:  Anti-convulsants: no  Muscle relaxors: no  Anti-depressants: no  Acetaminophen/NSAIDs: active  Topicals: lidocaine patches  Headache abortives: no  Headache prophylactics: no  Opioids: morphine    Other treatments have included:  Physical therapy: yes, has not helped  Pain Psychology: no  Chiropractic: no  Acupuncture: no  TENs Unit: no  Injections: right hip and knee steroid  Surgeries: no    Past Medical History:  Medical history reviewed.   Past Medical History:   Diagnosis Date     Anemia      Aortic stenosis      Arthritis      Bicuspid aortic valve      Bladder infection      Cancer (H)     SQUAMOUS 2X     Closed burst fracture of lumbar vertebra (H) 03/26/2019    L4 BURST FX WITH RETROPULSION     Dyslipidemia      Fall 03/26/2019 "     GERD (gastroesophageal reflux disease)      History of transfusion     SHE DOESN'T THINK SHE HAD A TRANSFUSION REACTION     Hypertension       Patient Active Problem List   Diagnosis     Calculus of ureter     E. coli UTI     Hypoxia     Fever, unspecified fever cause     Age-related osteoporosis with current pathological fracture with routine healing, subsequent encounter     Closed wedge compression fracture of L2 vertebra with routine healing, subsequent encounter     Anemia, unspecified type     Compression fracture of T11 vertebra with routine healing, subsequent encounter     Compression fracture of L1 vertebra with routine healing, subsequent encounter     Nephrolithiasis     Hypercalcemia     Tooth decay     Closed compression fracture of L4 lumbar vertebra, sequela     Past Surgical History:  Pertinent surgical history reviewed.   Past Surgical History:   Procedure Laterality Date     C RECONSTR TOTAL SHOULDER IMPLANT Left 12/26/2016    Procedure: LEFT REVERSE TOTAL SHOULDER ARTHROPLASTY;  Surgeon: Maurilio Marcelino MD;  Location: [facility] [place];  Service: Orthopedics     COMBINED CYSTOSCOPY, INSERT STENT URETER(S) Right 8/4/2021    Procedure: CYSTOURETEROSCOPY, RETROGRADE PYLEOGRAM RIGHT ;  Surgeon: Marbin Liu MD;  [facility]     OTHER SURGICAL HISTORY Right     surgery for prior wrist fracture     OTHER SURGICAL HISTORY Right     surgery for prior knee fracture     [facility]  12/25/2016           Medications: Pertinent medications reviewed and updated  Current Outpatient Medications   Medication Sig Dispense Refill     diclofenac (VOLTAREN) 1 % topical gel Apply 2-4 g topically 3 times daily as needed for moderate pain Apply to knees and back        ferrous sulfate 325 (65 FE) MG tablet [FERROUS SULFATE 325 (65 FE) MG TABLET] Take 1 tablet (325 mg total) by mouth daily with breakfast. 90 tablet 0     furosemide (LASIX) 20 MG tablet Take 1 tablet (20 mg) by mouth daily 30 tablet 0      gabapentin (NEURONTIN) 300 MG capsule Take 300 mg by mouth 3 times daily        IBUPROFEN PO        lidocaine 4 % patch Place 1 patch onto the skin daily as needed Apply to knee       methocarbamol (ROBAXIN) 500 MG tablet Take 1 tablet (500 mg) by mouth 4 times daily as needed for muscle spasms 30 tablet 1     morphine (MSIR) 15 MG IR tablet Take 0.5-1 tablet every 4-6 hours as needed for breakthrough pain 20 tablet 0     prednisoLONE acetate (PRED FORTE) 1 % ophthalmic suspension Place 1 drop into the right eye 2 times daily        senna-docusate (SENNOSIDES-DOCUSATE SODIUM) 8.6-50 mg tablet Take 1 tablet by mouth 2 times daily as needed for constipation        [place] and [place] Prescription Monitoring Program reviewed     Allergies: Pertinent allergies reviewed     Allergies   Allergen Reactions     Allopurinol Other (See Comments)     Cefdinir Hives     Clindamycin Nausea     C.Diff     Codeine Nausea and Vomiting     Tolerates oral morphine.     Flagyl [Metronidazole] Angioedema     Sulfa (Sulfonamide Antibiotics) [Sulfa Drugs] Nausea     Canker sores     Tramadol Unknown     Upset stomach     Family History:   family history includes Breast Cancer in her mother; Diabetes in her brother and father; Diabetes Type 2  in an other family member.    Social history:   Social History     Socioeconomic History     Marital status:      Spouse name: Not on file     Number of children: Not on file     Years of education: Not on file     Highest education level: Not on file   Occupational History     Not on file   Tobacco Use     Smoking status: Never Smoker     Smokeless tobacco: Never Used   Substance and Sexual Activity     Alcohol use: Yes     Alcohol/week: 7.0 standard drinks     Comment: Alcoholic Drinks/day: 1 shot of bee daily     Drug use: No     Sexual activity: Not on file   Other Topics Concern     Parent/sibling w/ CABG, MI or angioplasty before 65F 55M? Not Asked   Social History Narrative     Not on  file     Social Determinants of Health     Financial Resource Strain:      Difficulty of Paying Living Expenses:    Food Insecurity:      Worried About Running Out of Food in the Last Year:      Ran Out of Food in the Last Year:    Transportation Needs:      Lack of Transportation (Medical):      Lack of Transportation (Non-Medical):    Physical Activity:      Days of Exercise per Week:      Minutes of Exercise per Session:    Stress:      Feeling of Stress :    Social Connections:      Frequency of Communication with Friends and Family:      Frequency of Social Gatherings with Friends and Family:      Attends Islam Services:      Active Member of Clubs or Organizations:      Attends Club or Organization Meetings:      Marital Status:    Intimate Partner Violence:      Fear of Current or Ex-Partner:      Emotionally Abused:      Physically Abused:      Sexually Abused:      Social History     Social History Narrative     Not on file     She lives in a [facility] by herself. She is not currently working.  Smoking: no. Alcohol: no. Street drugs: no.     Review of Systems:  ROS   The 14 system ROS was reviewed from the intake questionnaire; results listed at end of note.    Physical Exam:  There were no vitals taken for this visit.    Physical Exam   Constitutional: She is oriented to person, place, and time.  She appears well-developed and well-nourished. She is not in acute distress.   HENT:     Head: Normocephalic and atraumatic.     Eyes: Pupils are equal, round, and reactive to light. EOM are normal. No scleral icterus.     Neck: Normal range of motion. Neck supple.   Cardiovascular: Normal rate and regular rhythm.   Pulmonary/Chest:  NWOB. No respiratory distress.   Abdominal: deferred  Genitourinary: deferred  Neurological: She is alert and oriented to person, place, and time. CN II-XII grossly intact, coordination grossly normal.  Skin: Skin is warm and dry. She is not diaphoretic.   Psychiatric: She has a  normal mood and affect. Her behavior is normal. Judgment and thought content normal.  MSK:limited exam due to mobility and TSLO brace. Pt guards against right thigh external/internal rotation heavily. Pain with knee ROM mostly focused to lateral anterior portion. Crepitus noticed with minimal movement in the knee. Normal sensation    Imaging:  EXAM: CT LUMBAR SPINE W/O CONTRAST  LOCATION: [facility]  DATE/TIME: 8/17/2021 3:39 PM     INDICATION: Closed compression fracture of L2 lumbar vertebra, initial encounter (H).  COMPARISON: Radiograph 08/01/2021. CT 08/01/2021.  TECHNIQUE: Routine CT Lumbar Spine without IV contrast. Multiplanar reformats. Dose reduction techniques were used.      FINDINGS:  NOMENCLATURE: 5 lumbar type vertebral bodies.     BONES: Inferior endplate height loss of the L2 vertebral body has progressed from approximately 10% to approximately 80%. There is slightly increased L2 retropulsion now measuring approximately 5 mm. A previously demonstrated lucent fracture plane   through the vertebral body persists. Severe T11 and L1 compression fractures have not appreciably changed in the interval. Mild chronic L4 inferior endplate height loss is also stable. The imaged portions of the sacrum and ildefonso appear intact. No   destructive bony lesions are demonstrated. Moderate L4-L5 facet arthropathy.     ALIGNMENT: There is again locally accentuated kyphosis centered at T12-L1, now measuring approximately 23 degrees. There is broad-based dextroconvex curvature centered at L2-L3. There is mild rightward lateral listhesis of L4 on L5 and of L3 on L4.     DISCS: At L3-L4, there is a broad-based disc-osteophyte complex. At L4-L5, there is severe intervertebral disc height loss with broad-based disc-osteophyte complex. At L5-S1, there is mild intervertebral disc height loss with shallow bulge.     SPINAL CANAL: Mild spinal canal stenosis related to L2 vertebral body retropulsion.  Moderate L4-L5 and mild L3-L4 spinal canal stenosis, similar to prior.     NEURAL FORAMINA: Moderate bilateral L4-L5 neural foraminal stenosis.     PARASPINAL SOFT TISSUES: Unremarkable.     ABDOMINAL CAVITY: The visualized structures demonstrate no definite acute abnormality.                                                                      IMPRESSION:  1.  Marked progression of L2 vertebral body height loss since 08/01/2021, as above.  2.  Stable severe T11, L1 and L4 compression deformities.  3.  At L4-L5, there is again moderate stenosis of the spinal canal and neural foramina.    EMG:  None available for review    Laboratory Results:  None available for review    Assessment:    The patient is a 86 year old female with PMHx of osteopenia, several vertebral fractures and right hip and knee arthritis who was referred by Dr. Neal for evaluation of back, right hip and knee pain. Her back pain is mildly improved with bracing following her fracture and she is following closely with her specialist and wishes to maintain her brace use at this time. She feels her ambulation is mostly limited by her hip pain. Exam is consistent with joint  pain and this is supported by recent imaging. She also endorses right knee pain which is also likely degenerative in nature. Given this, it would be most beneficial to target her hip and knee pain given the complex nature of her spine.   Would recommend starting with steroid injections and depending on her response could move towards targeted nerve treatments.  She is not interested in additional pharmacotherapy or complimentary therapies at this time, however these remain options for future treatment.    Visit Diagnoses:  1. Osteopenia  2. Right hip arthritis  3. Right knee arthritis  4. Chronic pain  5. Lumbar burst fracture    Plan:  Patient education:    We discussed with the patient the likely mechanisms underlying her pain.  In her case, this includes known fractures and  arthritis which are likely contributing to her overall pain picture.  In addition we discussed the role of central and peripheral pain processing in the development and propagation of chronic pain as a disorder as well as the importance of multidisciplinary treatment and multimodal medication therapy.    Work up:    No further work up at this time    Referrals:    none    Medications:    No changes    Therapies:    Recommend PT in home    Interventions:    Schedule right hip and knee steroid injection    Follow up: 2-3 weeks post knee and hip injection, assess for further interventions at this time    Thank you for the consult.    Henry Wiley DO CA-2  Department of Anesthesiology

## 2021-11-10 ENCOUNTER — TELEPHONE (OUTPATIENT)
Dept: ANESTHESIOLOGY | Facility: CLINIC | Age: 86
End: 2021-11-10
Payer: COMMERCIAL

## 2021-11-10 DIAGNOSIS — M16.11 PRIMARY OSTEOARTHRITIS OF RIGHT HIP: Primary | ICD-10-CM

## 2021-11-10 DIAGNOSIS — M17.11 PRIMARY OSTEOARTHRITIS OF RIGHT KNEE: ICD-10-CM

## 2021-11-10 NOTE — TELEPHONE ENCOUNTER
[facility] Call Center    Phone Message    May a detailed message be left on voicemail: yes     Reason for Call: Other: pt requesting a call back from her care team to discuss clinic options in [place]. Pls folow up wit pt to advise. Thank you.     Action Taken: Message routed to:  [facility]    Travel Screening: Not Applicable

## 2021-11-12 NOTE — TELEPHONE ENCOUNTER
RN called patient and discussed that a procedure only pain management referral has been ordered for patient to have her hip and knee steroid injections completed in [place], as patient requested this at her visit with Dr. Flores since it's closer to home for her. Writer gave patient contact number to [facility].    Kat Alejandro RN

## 2021-11-16 DIAGNOSIS — M25.551 HIP PAIN, RIGHT: Primary | ICD-10-CM

## 2021-11-19 ENCOUNTER — HOSPITAL ENCOUNTER (OUTPATIENT)
Facility: AMBULATORY SURGERY CENTER | Age: 86
End: 2021-11-19
Attending: ANESTHESIOLOGY
Payer: COMMERCIAL

## 2021-11-19 ENCOUNTER — TELEPHONE (OUTPATIENT)
Dept: ANESTHESIOLOGY | Facility: CLINIC | Age: 86
End: 2021-11-19
Payer: COMMERCIAL

## 2021-11-19 DIAGNOSIS — M17.11 PRIMARY OSTEOARTHRITIS OF RIGHT KNEE: ICD-10-CM

## 2021-11-19 DIAGNOSIS — M16.11 PRIMARY OSTEOARTHRITIS OF RIGHT HIP: ICD-10-CM

## 2021-11-19 NOTE — TELEPHONE ENCOUNTER
[facility] Call Center    Phone Message    May a detailed message be left on voicemail: yes     Reason for Call: Other: Patient calling about getting her pre-op COVID test completed. Patient was informed today by the clinic that she needed it prior to her 11/30/2021 procedure. Patient was given the COVID Nurse Line phone number [phone] and routed over to discuss further.     Action Taken: Message routed to:  [facility]    Travel Screening: Not Applicable

## 2021-11-23 NOTE — TELEPHONE ENCOUNTER
RN returned call to patient. Patient reported the Covid test has been scheduled along with her procedure at [facility]. Patient had no further questions at this time.     Kat Alejandro RN

## 2021-11-24 ENCOUNTER — TELEPHONE (OUTPATIENT)
Dept: PALLIATIVE MEDICINE | Facility: OTHER | Age: 86
End: 2021-11-24
Payer: COMMERCIAL

## 2021-11-30 ENCOUNTER — ANCILLARY PROCEDURE (OUTPATIENT)
Dept: PALLIATIVE MEDICINE | Facility: OTHER | Age: 86
End: 2021-11-30
Payer: COMMERCIAL

## 2021-11-30 VITALS — SYSTOLIC BLOOD PRESSURE: 122 MMHG | OXYGEN SATURATION: 97 % | DIASTOLIC BLOOD PRESSURE: 59 MMHG | HEART RATE: 78 BPM

## 2021-11-30 DIAGNOSIS — M25.551 HIP PAIN, RIGHT: Primary | ICD-10-CM

## 2021-11-30 PROCEDURE — 250N000011 HC RX IP 250 OP 636: Performed by: PAIN MEDICINE

## 2021-11-30 PROCEDURE — 250N000009 HC RX 250: Mod: JW | Performed by: PAIN MEDICINE

## 2021-11-30 PROCEDURE — 255N000002 HC RX 255 OP 636: Performed by: PAIN MEDICINE

## 2021-11-30 PROCEDURE — 20610 DRAIN/INJ JOINT/BURSA W/O US: CPT | Mod: RT | Performed by: PAIN MEDICINE

## 2021-11-30 PROCEDURE — 77002 NEEDLE LOCALIZATION BY XRAY: CPT | Mod: 26 | Performed by: PAIN MEDICINE

## 2021-11-30 RX ORDER — CHLORHEXIDINE GLUCONATE ORAL RINSE 1.2 MG/ML
SOLUTION DENTAL
COMMUNITY
Start: 2021-11-01 | End: 2022-01-01

## 2021-11-30 RX ORDER — BETAMETHASONE SODIUM PHOSPHATE AND BETAMETHASONE ACETATE 3; 3 MG/ML; MG/ML
INJECTION, SUSPENSION INTRA-ARTICULAR; INTRALESIONAL; INTRAMUSCULAR; SOFT TISSUE
Status: COMPLETED | OUTPATIENT
Start: 2021-11-30 | End: 2021-11-30

## 2021-11-30 RX ORDER — LIDOCAINE HYDROCHLORIDE 10 MG/ML
INJECTION, SOLUTION EPIDURAL; INFILTRATION; INTRACAUDAL; PERINEURAL
Status: COMPLETED | OUTPATIENT
Start: 2021-11-30 | End: 2021-11-30

## 2021-11-30 RX ORDER — BUPIVACAINE HYDROCHLORIDE 2.5 MG/ML
INJECTION, SOLUTION EPIDURAL; INFILTRATION; INTRACAUDAL
Status: COMPLETED | OUTPATIENT
Start: 2021-11-30 | End: 2021-11-30

## 2021-11-30 RX ADMIN — IOHEXOL 4 ML: 300 INJECTION, SOLUTION INTRAVENOUS at 13:35

## 2021-11-30 RX ADMIN — LIDOCAINE HYDROCHLORIDE 3 ML: 10 INJECTION, SOLUTION EPIDURAL; INFILTRATION; INTRACAUDAL; PERINEURAL at 13:29

## 2021-11-30 RX ADMIN — BETAMETHASONE SODIUM PHOSPHATE AND BETAMETHASONE ACETATE 6 MG: 3; 3 INJECTION, SUSPENSION INTRA-ARTICULAR; INTRALESIONAL; INTRAMUSCULAR at 13:30

## 2021-11-30 RX ADMIN — BUPIVACAINE HYDROCHLORIDE 3 ML: 2.5 INJECTION, SOLUTION EPIDURAL; INFILTRATION; INTRACAUDAL at 13:30

## 2021-11-30 ASSESSMENT — PAIN SCALES - GENERAL: PAINLEVEL: WORST PAIN (10)

## 2021-11-30 NOTE — LETTER
11/30/2021         RE: Shirley Phipps  [address]  [facility] [address]        Dear Colleague,    Thank you for referring your patient, Shirley Phipps, to the [facility]. Please see a copy of my visit note below.      Pre-procedure Intake  If YES to any questions or NO to having a   Please complete laminated checklist and leave on the computer keyboard for Provider, verbally inform provider if able.    For SCS Trial, RFA's or any sedation procedure:  Have you been fasting? No     If yes, for how long?     Are you taking any any blood thinners such as Coumadin, Warfarin, Jantoven, Pradaxa Xarelto, Eliquis, Edoxaban, Enoxaparin, Lovenox, Heparin, Arixtra, Fondaparinux, or Fragmin? OR Antiplatelet medication such as Plavix, Brilinta, or Effient?   No     If yes, when did you take your last dose?     Do you take aspirin?  No    If cervical procedure, have you held aspirin for 6 days?   Yes - Ibuprofren    Do you have any allergies to contrast dye, iodine, steroid and/or numbing medications?  YES: Contrast dye, Iodine    Are you currently taking antibiotics or have an active infection?  NO    Have you had a fever/elevated temperature within the past week? NO    Are you currently taking oral steroids? NO    Do you have a ? Yes    Are you pregnant or breastfeeding?  NO    Have you received the COVID-19 vaccine? Yes    If yes, was it your 1st, 2nd or only dose needed? 09/30/2021    Date of most recent vaccine: 09/30/2021    Notify provider and RNs if systolic BP >170, diastolic BP >100, P >100 or O2 sats < 90%            Again, thank you for allowing me to participate in the care of your patient.        Sincerely,        Maurilio TOMLINSON MD

## 2021-11-30 NOTE — PATIENT INSTRUCTIONS
POST PROCEDURE INSTRUCTIONS  PROCEDURE DONE TODAY: RIGHT HIP INJECTION    Rest today. Resume activity tomorrow as able.  Patient demonstrates the ability to ambulate independently with a steady gait at the time of discharge.      It is not unusual to have a temporary increase in your pain after a procedure. You can ice the area 24-48 hrs. 15 min at a time.      You received a steroid injection. This medication can take 2-7 days to take effect. Some temporary side effects include:    Heartburn    Flushed-red face    Insomnia-trouble sleeping-jitters    Diabetics may see a rise in blood sugar for a few days    Low back or SI joint (sacroiliac) injection: you may experience numbness in one or both legs. Please walk with help. This is temporary and will wear off in a few hours. Do not drive if you are tingling, numbness or weakness in your hands/arms or legs/feet.    Headache:  If you experience a headache after a lumbar epidural injection, lie down and drink fluids (especially caffeine). The headache will go away gradually. If it persists notify our clinic.    Fever: call if fever 100 or over, redness/warmth/swelling over the injection site.    No public hot tubs/pools for a couple days    Physical therapy: check with pain center provider regarding resuming therapy.    Monitor your response to the injection and report this at your next visit.    If you have been referred for a procedure only, please call your referring provider for a follow up.    IF YOU PLAN TO GET A FLU SHOT YOU MUST WAIT 2 WEEKS AFTER THIS INJECTION.      CALL IF ANY QUESTIONS [phone]

## 2021-11-30 NOTE — PROGRESS NOTES
Pre-procedure Intake  If YES to any questions or NO to having a   Please complete laminated checklist and leave on the computer keyboard for Provider, verbally inform provider if able.    For SCS Trial, RFA's or any sedation procedure:  Have you been fasting? No     If yes, for how long?     Are you taking any any blood thinners such as Coumadin, Warfarin, Jantoven, Pradaxa Xarelto, Eliquis, Edoxaban, Enoxaparin, Lovenox, Heparin, Arixtra, Fondaparinux, or Fragmin? OR Antiplatelet medication such as Plavix, Brilinta, or Effient?   No     If yes, when did you take your last dose?     Do you take aspirin?  No    If cervical procedure, have you held aspirin for 6 days?   Yes - Ibuprofren    Do you have any allergies to contrast dye, iodine, steroid and/or numbing medications?  YES: Contrast dye, Iodine    Are you currently taking antibiotics or have an active infection?  NO    Have you had a fever/elevated temperature within the past week? NO    Are you currently taking oral steroids? NO    Do you have a ? Yes    Are you pregnant or breastfeeding?  NO    Have you received the COVID-19 vaccine? Yes    If yes, was it your 1st, 2nd or only dose needed? 09/30/2021    Date of most recent vaccine: 09/30/2021    Notify provider and RNs if systolic BP >170, diastolic BP >100, P >100 or O2 sats < 90%

## 2021-12-14 ENCOUNTER — TELEPHONE (OUTPATIENT)
Dept: PALLIATIVE MEDICINE | Facility: OTHER | Age: 86
End: 2021-12-14
Payer: COMMERCIAL

## 2021-12-15 ENCOUNTER — ANCILLARY PROCEDURE (OUTPATIENT)
Dept: PALLIATIVE MEDICINE | Facility: OTHER | Age: 86
End: 2021-12-15
Payer: COMMERCIAL

## 2021-12-15 VITALS
DIASTOLIC BLOOD PRESSURE: 59 MMHG | HEIGHT: 63 IN | SYSTOLIC BLOOD PRESSURE: 159 MMHG | BODY MASS INDEX: 15.95 KG/M2 | RESPIRATION RATE: 16 BRPM | HEART RATE: 76 BPM | WEIGHT: 90 LBS

## 2021-12-15 DIAGNOSIS — M17.11 PRIMARY OSTEOARTHRITIS OF RIGHT KNEE: Primary | ICD-10-CM

## 2021-12-15 PROCEDURE — 77002 NEEDLE LOCALIZATION BY XRAY: CPT | Mod: 26 | Performed by: PAIN MEDICINE

## 2021-12-15 PROCEDURE — 255N000002 HC RX 255 OP 636: Performed by: PAIN MEDICINE

## 2021-12-15 PROCEDURE — 20610 DRAIN/INJ JOINT/BURSA W/O US: CPT | Mod: RT | Performed by: PAIN MEDICINE

## 2021-12-15 PROCEDURE — 250N000011 HC RX IP 250 OP 636: Performed by: PAIN MEDICINE

## 2021-12-15 PROCEDURE — 250N000009 HC RX 250: Performed by: PAIN MEDICINE

## 2021-12-15 RX ORDER — LIDOCAINE HYDROCHLORIDE 10 MG/ML
INJECTION, SOLUTION EPIDURAL; INFILTRATION; INTRACAUDAL; PERINEURAL
Status: COMPLETED | OUTPATIENT
Start: 2021-12-15 | End: 2021-12-15

## 2021-12-15 RX ORDER — BUPIVACAINE HYDROCHLORIDE 2.5 MG/ML
INJECTION, SOLUTION EPIDURAL; INFILTRATION; INTRACAUDAL
Status: COMPLETED | OUTPATIENT
Start: 2021-12-15 | End: 2021-12-15

## 2021-12-15 RX ORDER — BETAMETHASONE SODIUM PHOSPHATE AND BETAMETHASONE ACETATE 3; 3 MG/ML; MG/ML
INJECTION, SUSPENSION INTRA-ARTICULAR; INTRALESIONAL; INTRAMUSCULAR; SOFT TISSUE
Status: COMPLETED | OUTPATIENT
Start: 2021-12-15 | End: 2021-12-15

## 2021-12-15 RX ADMIN — LIDOCAINE HYDROCHLORIDE 3 ML: 10 INJECTION, SOLUTION EPIDURAL; INFILTRATION; INTRACAUDAL; PERINEURAL at 13:32

## 2021-12-15 RX ADMIN — BUPIVACAINE HYDROCHLORIDE 3 ML: 2.5 INJECTION, SOLUTION EPIDURAL; INFILTRATION; INTRACAUDAL at 13:32

## 2021-12-15 RX ADMIN — BETAMETHASONE SODIUM PHOSPHATE AND BETAMETHASONE ACETATE 6 MG: 3; 3 INJECTION, SUSPENSION INTRA-ARTICULAR; INTRALESIONAL; INTRAMUSCULAR at 13:33

## 2021-12-15 RX ADMIN — IOHEXOL 4 ML: 300 INJECTION, SOLUTION INTRAVENOUS at 13:25

## 2021-12-15 ASSESSMENT — MIFFLIN-ST. JEOR: SCORE: 811.02

## 2021-12-15 ASSESSMENT — PAIN SCALES - GENERAL
PAINLEVEL: EXTREME PAIN (8)
PAINLEVEL: EXTREME PAIN (9)

## 2021-12-15 NOTE — PATIENT INSTRUCTIONS
[facility] [place]   Procedure Discharge Instructions    Today you saw:    Dr. Berry    You had a RIGHT KNEE INJECTION    Medications used:  Lidocaine   Bupivacaine Betamethasone Omnipaque              If you were holding your blood thinning medication, please restart taking it: Not applicable    Be cautious when walking. Numbness and/or weakness in the lower extremities may occur for up to 6-8 hours after the procedure due to effect of the local anesthetic    Do not drive for 6 hours. The effect of the local anesthetic could slow your reflexes.     You may resume your regular activities after 24 hours    Avoid strenuous activity for the first 24 hours    You may shower, however avoid swimming, tub baths or hot tubs for 24 hours following your procedure    You may have a mild to moderate increase in pain for several days following the injection.    It may take up to 14 days for the steroid medication to start working although you may feel the effect as early as a few days after the procedure.       You may use ice packs for 10-15 minutes, 3 to 4 times a day at the injection site for comfort    Do not use heat to painful areas for 6 to 8 hours. This will give the local anesthetic time to wear off and prevent you from accidentally burning your skin.     Unless you have been directed to avoid the use of anti-inflammatory medications (NSAIDS), you may use medications such as ibuprofen, Aleve or Tylenol for pain control if needed.     If you were fasting, you may resume your normal diet and medications after the procedure    If you have diabetes, check your blood sugar more frequently than usual as your blood sugar may be higher than normal for 10-14 days following a steroid injection. Contact your doctor who manages your diabetes if your blood sugar is higher than usual    Possible side effects of steroids that you may experience include flushing, elevated blood pressure, increased  appetite, mild headaches and restlessness.  All of these symptoms will get better with time.    If you experience any of the following, call the Pain Clinic at [phone]:  -Fever over 100 degree F  -Swelling, bleeding, redness, drainage, warmth at the injection site  -Progressive weakness or numbness in your legs or arms  -Loss of bowel or bladder function  -Unusual headache that is not relieved by Tylenol or other pain reliever  -Unusual new onset of pain that is not improving

## 2021-12-15 NOTE — LETTER
12/15/2021         RE: Shirley Phipps  [address]  [place] [place] [facility] [address]        Dear Colleague,    Thank you for referring your patient, Shirley Phipps, to the [facility]. Please see a copy of my visit note below.    Pt is scheduled for right knee injection for her right knee pain.      Again, thank you for allowing me to participate in the care of your patient.        Sincerely,        Maurilio TOMLINSON MD

## 2021-12-29 ENCOUNTER — DOCUMENTATION ONLY (OUTPATIENT)
Dept: ENDOCRINOLOGY | Facility: CLINIC | Age: 86
End: 2021-12-29
Payer: COMMERCIAL

## 2021-12-29 DIAGNOSIS — M80.00XD AGE-RELATED OSTEOPOROSIS WITH CURRENT PATHOLOGICAL FRACTURE WITH ROUTINE HEALING, SUBSEQUENT ENCOUNTER: Primary | ICD-10-CM

## 2021-12-29 RX ORDER — ALBUTEROL SULFATE 90 UG/1
1-2 AEROSOL, METERED RESPIRATORY (INHALATION)
Status: CANCELLED
Start: 2022-01-01

## 2021-12-29 RX ORDER — EPINEPHRINE 1 MG/ML
0.3 INJECTION, SOLUTION, CONCENTRATE INTRAVENOUS EVERY 5 MIN PRN
Status: CANCELLED | OUTPATIENT
Start: 2022-01-01

## 2021-12-29 RX ORDER — MEPERIDINE HYDROCHLORIDE 25 MG/ML
25 INJECTION INTRAMUSCULAR; INTRAVENOUS; SUBCUTANEOUS EVERY 30 MIN PRN
Status: CANCELLED | OUTPATIENT
Start: 2022-01-01

## 2021-12-29 RX ORDER — DIPHENHYDRAMINE HYDROCHLORIDE 50 MG/ML
50 INJECTION INTRAMUSCULAR; INTRAVENOUS
Status: CANCELLED
Start: 2022-01-01

## 2021-12-29 RX ORDER — METHYLPREDNISOLONE SODIUM SUCCINATE 125 MG/2ML
125 INJECTION, POWDER, LYOPHILIZED, FOR SOLUTION INTRAMUSCULAR; INTRAVENOUS
Status: CANCELLED
Start: 2022-01-01

## 2021-12-29 RX ORDER — ALBUTEROL SULFATE 0.83 MG/ML
2.5 SOLUTION RESPIRATORY (INHALATION)
Status: CANCELLED | OUTPATIENT
Start: 2022-01-01

## 2021-12-29 RX ORDER — NALOXONE HYDROCHLORIDE 0.4 MG/ML
0.2 INJECTION, SOLUTION INTRAMUSCULAR; INTRAVENOUS; SUBCUTANEOUS
Status: CANCELLED | OUTPATIENT
Start: 2022-01-01

## 2022-01-01 ENCOUNTER — TELEPHONE (OUTPATIENT)
Dept: CARDIOLOGY | Facility: CLINIC | Age: 87
End: 2022-01-01

## 2022-01-01 ENCOUNTER — TELEPHONE (OUTPATIENT)
Dept: NEUROSURGERY | Facility: CLINIC | Age: 87
End: 2022-01-01
Payer: COMMERCIAL

## 2022-01-01 ENCOUNTER — MYC MEDICAL ADVICE (OUTPATIENT)
Dept: ENDOCRINOLOGY | Facility: CLINIC | Age: 87
End: 2022-01-01

## 2022-01-01 ENCOUNTER — APPOINTMENT (OUTPATIENT)
Dept: PHYSICAL THERAPY | Facility: HOSPITAL | Age: 87
End: 2022-01-01
Attending: INTERNAL MEDICINE
Payer: COMMERCIAL

## 2022-01-01 ENCOUNTER — APPOINTMENT (OUTPATIENT)
Dept: OCCUPATIONAL THERAPY | Facility: HOSPITAL | Age: 87
End: 2022-01-01
Attending: FAMILY MEDICINE
Payer: COMMERCIAL

## 2022-01-01 ENCOUNTER — HEALTH MAINTENANCE LETTER (OUTPATIENT)
Age: 87
End: 2022-01-01

## 2022-01-01 ENCOUNTER — NURSE TRIAGE (OUTPATIENT)
Dept: CARDIOLOGY | Facility: CLINIC | Age: 87
End: 2022-01-01

## 2022-01-01 ENCOUNTER — TELEPHONE (OUTPATIENT)
Dept: ENDOCRINOLOGY | Facility: CLINIC | Age: 87
End: 2022-01-01

## 2022-01-01 ENCOUNTER — APPOINTMENT (OUTPATIENT)
Dept: OCCUPATIONAL THERAPY | Facility: HOSPITAL | Age: 87
End: 2022-01-01
Attending: INTERNAL MEDICINE
Payer: COMMERCIAL

## 2022-01-01 ENCOUNTER — PATIENT OUTREACH (OUTPATIENT)
Dept: CARE COORDINATION | Facility: CLINIC | Age: 87
End: 2022-01-01
Payer: COMMERCIAL

## 2022-01-01 ENCOUNTER — VIRTUAL VISIT (OUTPATIENT)
Dept: ENDOCRINOLOGY | Facility: CLINIC | Age: 87
End: 2022-01-01
Payer: COMMERCIAL

## 2022-01-01 ENCOUNTER — HOSPITAL ENCOUNTER (OUTPATIENT)
Facility: HOSPITAL | Age: 87
Setting detail: OBSERVATION
Discharge: HOME | End: 2022-02-17
Attending: EMERGENCY MEDICINE | Admitting: FAMILY MEDICINE
Payer: COMMERCIAL

## 2022-01-01 ENCOUNTER — APPOINTMENT (OUTPATIENT)
Dept: RADIOLOGY | Facility: HOSPITAL | Age: 87
End: 2022-01-01
Attending: NURSE PRACTITIONER
Payer: COMMERCIAL

## 2022-01-01 ENCOUNTER — TELEPHONE (OUTPATIENT)
Dept: ENDOCRINOLOGY | Facility: CLINIC | Age: 87
End: 2022-01-01
Payer: COMMERCIAL

## 2022-01-01 VITALS
RESPIRATION RATE: 18 BRPM | DIASTOLIC BLOOD PRESSURE: 59 MMHG | BODY MASS INDEX: 18.77 KG/M2 | WEIGHT: 102 LBS | SYSTOLIC BLOOD PRESSURE: 120 MMHG | OXYGEN SATURATION: 95 % | TEMPERATURE: 98.3 F | HEART RATE: 80 BPM | HEIGHT: 62 IN

## 2022-01-01 DIAGNOSIS — E83.52 HYPERCALCEMIA: ICD-10-CM

## 2022-01-01 DIAGNOSIS — S32.010D COMPRESSION FRACTURE OF L1 VERTEBRA WITH ROUTINE HEALING, SUBSEQUENT ENCOUNTER: ICD-10-CM

## 2022-01-01 DIAGNOSIS — S32.040S CLOSED COMPRESSION FRACTURE OF L4 LUMBAR VERTEBRA, SEQUELA: ICD-10-CM

## 2022-01-01 DIAGNOSIS — S22.080D COMPRESSION FRACTURE OF T11 VERTEBRA WITH ROUTINE HEALING, SUBSEQUENT ENCOUNTER: ICD-10-CM

## 2022-01-01 DIAGNOSIS — E21.3 HYPERPARATHYROIDISM (H): ICD-10-CM

## 2022-01-01 DIAGNOSIS — M16.11 PRIMARY OSTEOARTHRITIS OF RIGHT HIP: Primary | ICD-10-CM

## 2022-01-01 DIAGNOSIS — S22.069A T8 VERTEBRAL FRACTURE (H): Primary | ICD-10-CM

## 2022-01-01 DIAGNOSIS — S32.020A CLOSED COMPRESSION FRACTURE OF L2 LUMBAR VERTEBRA, INITIAL ENCOUNTER (H): ICD-10-CM

## 2022-01-01 DIAGNOSIS — S32.020A COMPRESSION FRACTURE OF L2 VERTEBRA, INITIAL ENCOUNTER (H): ICD-10-CM

## 2022-01-01 DIAGNOSIS — S22.060A COMPRESSION FRACTURE OF T8 VERTEBRA, INITIAL ENCOUNTER (H): ICD-10-CM

## 2022-01-01 DIAGNOSIS — S32.020D CLOSED WEDGE COMPRESSION FRACTURE OF L2 VERTEBRA WITH ROUTINE HEALING, SUBSEQUENT ENCOUNTER: ICD-10-CM

## 2022-01-01 DIAGNOSIS — S22.080A COMPRESSION FRACTURE OF T11 VERTEBRA, INITIAL ENCOUNTER (H): ICD-10-CM

## 2022-01-01 DIAGNOSIS — R10.9 RIGHT FLANK PAIN: ICD-10-CM

## 2022-01-01 DIAGNOSIS — I35.0 AORTIC STENOSIS: Primary | ICD-10-CM

## 2022-01-01 DIAGNOSIS — N20.0 NEPHROLITHIASIS: ICD-10-CM

## 2022-01-01 DIAGNOSIS — Z71.89 OTHER SPECIFIED COUNSELING: ICD-10-CM

## 2022-01-01 DIAGNOSIS — S32.010A COMPRESSION FRACTURE OF L1 VERTEBRA, INITIAL ENCOUNTER (H): ICD-10-CM

## 2022-01-01 DIAGNOSIS — S22.030A COMPRESSION FRACTURE OF T3 VERTEBRA, INITIAL ENCOUNTER (H): ICD-10-CM

## 2022-01-01 DIAGNOSIS — D89.89 KAPPA LIGHT CHAIN DISEASE (H): ICD-10-CM

## 2022-01-01 DIAGNOSIS — M80.00XD AGE-RELATED OSTEOPOROSIS WITH CURRENT PATHOLOGICAL FRACTURE WITH ROUTINE HEALING, SUBSEQUENT ENCOUNTER: Primary | ICD-10-CM

## 2022-01-01 LAB
ALBUMIN SERPL-MCNC: 3.6 G/DL (ref 3.5–5)
ALBUMIN UR-MCNC: 20 MG/DL
ALP SERPL-CCNC: 48 U/L (ref 45–120)
ALT SERPL W P-5'-P-CCNC: <9 U/L (ref 0–45)
ANION GAP SERPL CALCULATED.3IONS-SCNC: 3 MMOL/L (ref 5–18)
ANION GAP SERPL CALCULATED.3IONS-SCNC: 6 MMOL/L (ref 5–18)
APPEARANCE UR: ABNORMAL
AST SERPL W P-5'-P-CCNC: 13 U/L (ref 0–40)
ATRIAL RATE - MUSE: 78 BPM
BACTERIA #/AREA URNS HPF: ABNORMAL /HPF
BACTERIA BLD CULT: NO GROWTH
BACTERIA BLD CULT: NO GROWTH
BACTERIA UR CULT: NO GROWTH
BASOPHILS # BLD AUTO: 0 10E3/UL (ref 0–0.2)
BASOPHILS NFR BLD AUTO: 0 %
BILIRUB DIRECT SERPL-MCNC: 0.2 MG/DL
BILIRUB SERPL-MCNC: 0.5 MG/DL (ref 0–1)
BILIRUB UR QL STRIP: NEGATIVE
BUN SERPL-MCNC: 12 MG/DL (ref 8–28)
BUN SERPL-MCNC: 20 MG/DL (ref 8–28)
C REACTIVE PROTEIN LHE: 1.2 MG/DL (ref 0–0.8)
CALCIUM SERPL-MCNC: 9.4 MG/DL (ref 8.5–10.5)
CALCIUM SERPL-MCNC: 9.9 MG/DL (ref 8.5–10.5)
CHLORIDE BLD-SCNC: 105 MMOL/L (ref 98–107)
CHLORIDE BLD-SCNC: 108 MMOL/L (ref 98–107)
CO2 SERPL-SCNC: 26 MMOL/L (ref 22–31)
CO2 SERPL-SCNC: 26 MMOL/L (ref 22–31)
COLOR UR AUTO: YELLOW
CREAT SERPL-MCNC: 0.77 MG/DL (ref 0.6–1.1)
CREAT SERPL-MCNC: 0.87 MG/DL (ref 0.6–1.1)
DIASTOLIC BLOOD PRESSURE - MUSE: NORMAL MMHG
EOSINOPHIL # BLD AUTO: 0 10E3/UL (ref 0–0.7)
EOSINOPHIL NFR BLD AUTO: 0 %
ERYTHROCYTE [DISTWIDTH] IN BLOOD BY AUTOMATED COUNT: 13.1 % (ref 10–15)
ERYTHROCYTE [DISTWIDTH] IN BLOOD BY AUTOMATED COUNT: 13.1 % (ref 10–15)
GFR SERPL CREATININE-BSD FRML MDRD: 65 ML/MIN/1.73M2
GFR SERPL CREATININE-BSD FRML MDRD: 75 ML/MIN/1.73M2
GLUCOSE BLD-MCNC: 102 MG/DL (ref 70–125)
GLUCOSE BLD-MCNC: 92 MG/DL (ref 70–125)
GLUCOSE UR STRIP-MCNC: NEGATIVE MG/DL
HCT VFR BLD AUTO: 33.2 % (ref 35–47)
HCT VFR BLD AUTO: 34.7 % (ref 35–47)
HGB BLD-MCNC: 10.3 G/DL (ref 11.7–15.7)
HGB BLD-MCNC: 10.5 G/DL (ref 11.7–15.7)
HGB UR QL STRIP: ABNORMAL
HOLD SPECIMEN: NORMAL
HOLD SPECIMEN: NORMAL
IMM GRANULOCYTES # BLD: 0.1 10E3/UL
IMM GRANULOCYTES NFR BLD: 1 %
INTERPRETATION ECG - MUSE: NORMAL
KETONES UR STRIP-MCNC: NEGATIVE MG/DL
LACTATE SERPL-SCNC: 0.7 MMOL/L (ref 0.7–2)
LEUKOCYTE ESTERASE UR QL STRIP: ABNORMAL
LIPASE SERPL-CCNC: 11 U/L (ref 0–52)
LYMPHOCYTES # BLD AUTO: 0.9 10E3/UL (ref 0.8–5.3)
LYMPHOCYTES NFR BLD AUTO: 16 %
MCH RBC QN AUTO: 29.2 PG (ref 26.5–33)
MCH RBC QN AUTO: 29.6 PG (ref 26.5–33)
MCHC RBC AUTO-ENTMCNC: 29.7 G/DL (ref 31.5–36.5)
MCHC RBC AUTO-ENTMCNC: 31.6 G/DL (ref 31.5–36.5)
MCV RBC AUTO: 94 FL (ref 78–100)
MCV RBC AUTO: 98 FL (ref 78–100)
MONOCYTES # BLD AUTO: 0.9 10E3/UL (ref 0–1.3)
MONOCYTES NFR BLD AUTO: 16 %
MUCOUS THREADS #/AREA URNS LPF: PRESENT /LPF
NEUTROPHILS # BLD AUTO: 3.9 10E3/UL (ref 1.6–8.3)
NEUTROPHILS NFR BLD AUTO: 67 %
NITRATE UR QL: NEGATIVE
NRBC # BLD AUTO: 0 10E3/UL
NRBC BLD AUTO-RTO: 0 /100
P AXIS - MUSE: 46 DEGREES
PH UR STRIP: 5.5 [PH] (ref 5–7)
PLATELET # BLD AUTO: 100 10E3/UL (ref 150–450)
PLATELET # BLD AUTO: 94 10E3/UL (ref 150–450)
POTASSIUM BLD-SCNC: 4.4 MMOL/L (ref 3.5–5)
POTASSIUM BLD-SCNC: 4.5 MMOL/L (ref 3.5–5)
PR INTERVAL - MUSE: 178 MS
PROT SERPL-MCNC: 6.4 G/DL (ref 6–8)
QRS DURATION - MUSE: 130 MS
QT - MUSE: 404 MS
QTC - MUSE: 460 MS
R AXIS - MUSE: -13 DEGREES
RBC # BLD AUTO: 3.53 10E6/UL (ref 3.8–5.2)
RBC # BLD AUTO: 3.55 10E6/UL (ref 3.8–5.2)
RBC URINE: 73 /HPF
SARS-COV-2 RNA RESP QL NAA+PROBE: NEGATIVE
SODIUM SERPL-SCNC: 137 MMOL/L (ref 136–145)
SODIUM SERPL-SCNC: 137 MMOL/L (ref 136–145)
SP GR UR STRIP: 1.02 (ref 1–1.03)
SYSTOLIC BLOOD PRESSURE - MUSE: NORMAL MMHG
T AXIS - MUSE: 52 DEGREES
TROPONIN I SERPL-MCNC: 0.25 NG/ML (ref 0–0.29)
UROBILINOGEN UR STRIP-MCNC: <2 MG/DL
VENTRICULAR RATE- MUSE: 78 BPM
WBC # BLD AUTO: 4.6 10E3/UL (ref 4–11)
WBC # BLD AUTO: 5.7 10E3/UL (ref 4–11)
WBC CLUMPS #/AREA URNS HPF: PRESENT /HPF
WBC URINE: 29 /HPF

## 2022-01-01 PROCEDURE — 96361 HYDRATE IV INFUSION ADD-ON: CPT

## 2022-01-01 PROCEDURE — 82310 ASSAY OF CALCIUM: CPT | Performed by: PHYSICIAN ASSISTANT

## 2022-01-01 PROCEDURE — 97116 GAIT TRAINING THERAPY: CPT | Mod: GP

## 2022-01-01 PROCEDURE — 99220 PR INITIAL OBSERVATION CARE,LEVEL III: CPT | Performed by: CLINICAL NURSE SPECIALIST

## 2022-01-01 PROCEDURE — 96372 THER/PROPH/DIAG INJ SC/IM: CPT | Performed by: INTERNAL MEDICINE

## 2022-01-01 PROCEDURE — G0378 HOSPITAL OBSERVATION PER HR: HCPCS

## 2022-01-01 PROCEDURE — 99225 PR SUBSEQUENT OBSERVATION CARE,LEVEL II: CPT | Performed by: CLINICAL NURSE SPECIALIST

## 2022-01-01 PROCEDURE — 250N000013 HC RX MED GY IP 250 OP 250 PS 637: Performed by: INTERNAL MEDICINE

## 2022-01-01 PROCEDURE — 96375 TX/PRO/DX INJ NEW DRUG ADDON: CPT

## 2022-01-01 PROCEDURE — 99217 PR OBSERVATION CARE DISCHARGE: CPT | Performed by: FAMILY MEDICINE

## 2022-01-01 PROCEDURE — 87086 URINE CULTURE/COLONY COUNT: CPT | Performed by: PHYSICIAN ASSISTANT

## 2022-01-01 PROCEDURE — 87635 SARS-COV-2 COVID-19 AMP PRB: CPT | Performed by: PHYSICIAN ASSISTANT

## 2022-01-01 PROCEDURE — 97535 SELF CARE MNGMENT TRAINING: CPT | Mod: GO

## 2022-01-01 PROCEDURE — 99220 PR INITIAL OBSERVATION CARE,LEVEL III: CPT | Performed by: INTERNAL MEDICINE

## 2022-01-01 PROCEDURE — 80048 BASIC METABOLIC PNL TOTAL CA: CPT | Performed by: INTERNAL MEDICINE

## 2022-01-01 PROCEDURE — 99225 PR SUBSEQUENT OBSERVATION CARE,LEVEL II: CPT | Performed by: FAMILY MEDICINE

## 2022-01-01 PROCEDURE — 86140 C-REACTIVE PROTEIN: CPT | Performed by: PHYSICIAN ASSISTANT

## 2022-01-01 PROCEDURE — 97161 PT EVAL LOW COMPLEX 20 MIN: CPT | Mod: GP

## 2022-01-01 PROCEDURE — 85025 COMPLETE CBC W/AUTO DIFF WBC: CPT | Performed by: PHYSICIAN ASSISTANT

## 2022-01-01 PROCEDURE — 99285 EMERGENCY DEPT VISIT HI MDM: CPT | Mod: 25

## 2022-01-01 PROCEDURE — 99417 PROLNG OP E/M EACH 15 MIN: CPT | Performed by: NURSE PRACTITIONER

## 2022-01-01 PROCEDURE — 96376 TX/PRO/DX INJ SAME DRUG ADON: CPT

## 2022-01-01 PROCEDURE — 250N000011 HC RX IP 250 OP 636: Performed by: FAMILY MEDICINE

## 2022-01-01 PROCEDURE — 85027 COMPLETE CBC AUTOMATED: CPT | Performed by: INTERNAL MEDICINE

## 2022-01-01 PROCEDURE — 81001 URINALYSIS AUTO W/SCOPE: CPT | Performed by: PHYSICIAN ASSISTANT

## 2022-01-01 PROCEDURE — 250N000011 HC RX IP 250 OP 636: Performed by: PHYSICIAN ASSISTANT

## 2022-01-01 PROCEDURE — 99207 PR CDG-CODE CATEGORY CHANGED: CPT | Performed by: CLINICAL NURSE SPECIALIST

## 2022-01-01 PROCEDURE — 72080 X-RAY EXAM THORACOLMB 2/> VW: CPT

## 2022-01-01 PROCEDURE — 97530 THERAPEUTIC ACTIVITIES: CPT | Mod: GP

## 2022-01-01 PROCEDURE — 250N000013 HC RX MED GY IP 250 OP 250 PS 637: Performed by: FAMILY MEDICINE

## 2022-01-01 PROCEDURE — 258N000003 HC RX IP 258 OP 636: Performed by: PHYSICIAN ASSISTANT

## 2022-01-01 PROCEDURE — 36415 COLL VENOUS BLD VENIPUNCTURE: CPT | Performed by: PHYSICIAN ASSISTANT

## 2022-01-01 PROCEDURE — 87040 BLOOD CULTURE FOR BACTERIA: CPT | Performed by: PHYSICIAN ASSISTANT

## 2022-01-01 PROCEDURE — 99442 PR PHYSICIAN TELEPHONE EVALUATION 11-20 MIN: CPT | Performed by: INTERNAL MEDICINE

## 2022-01-01 PROCEDURE — 250N000011 HC RX IP 250 OP 636: Performed by: INTERNAL MEDICINE

## 2022-01-01 PROCEDURE — 93005 ELECTROCARDIOGRAM TRACING: CPT | Performed by: PHYSICIAN ASSISTANT

## 2022-01-01 PROCEDURE — 36415 COLL VENOUS BLD VENIPUNCTURE: CPT | Performed by: INTERNAL MEDICINE

## 2022-01-01 PROCEDURE — 99207 PR CDG-CODE CATEGORY CHANGED: CPT | Performed by: FAMILY MEDICINE

## 2022-01-01 PROCEDURE — 250N000013 HC RX MED GY IP 250 OP 250 PS 637: Performed by: CLINICAL NURSE SPECIALIST

## 2022-01-01 PROCEDURE — 99215 OFFICE O/P EST HI 40 MIN: CPT | Performed by: NURSE PRACTITIONER

## 2022-01-01 PROCEDURE — 83605 ASSAY OF LACTIC ACID: CPT | Performed by: PHYSICIAN ASSISTANT

## 2022-01-01 PROCEDURE — 97166 OT EVAL MOD COMPLEX 45 MIN: CPT | Mod: GO

## 2022-01-01 PROCEDURE — 83690 ASSAY OF LIPASE: CPT | Performed by: PHYSICIAN ASSISTANT

## 2022-01-01 PROCEDURE — 84484 ASSAY OF TROPONIN QUANT: CPT | Performed by: PHYSICIAN ASSISTANT

## 2022-01-01 PROCEDURE — 96365 THER/PROPH/DIAG IV INF INIT: CPT

## 2022-01-01 PROCEDURE — 250N000011 HC RX IP 250 OP 636: Performed by: EMERGENCY MEDICINE

## 2022-01-01 PROCEDURE — C9803 HOPD COVID-19 SPEC COLLECT: HCPCS

## 2022-01-01 PROCEDURE — 82248 BILIRUBIN DIRECT: CPT | Performed by: PHYSICIAN ASSISTANT

## 2022-01-01 RX ORDER — POLYETHYLENE GLYCOL 3350 17 G/17G
17 POWDER, FOR SOLUTION ORAL DAILY
Status: DISCONTINUED | OUTPATIENT
Start: 2022-01-01 | End: 2022-01-01 | Stop reason: HOSPADM

## 2022-01-01 RX ORDER — LIDOCAINE 40 MG/G
CREAM TOPICAL
Status: DISCONTINUED | OUTPATIENT
Start: 2022-01-01 | End: 2022-01-01 | Stop reason: HOSPADM

## 2022-01-01 RX ORDER — LEVOFLOXACIN 5 MG/ML
500 INJECTION, SOLUTION INTRAVENOUS ONCE
Status: COMPLETED | OUTPATIENT
Start: 2022-01-01 | End: 2022-01-01

## 2022-01-01 RX ORDER — PROCHLORPERAZINE 25 MG
12.5 SUPPOSITORY, RECTAL RECTAL EVERY 12 HOURS PRN
Status: DISCONTINUED | OUTPATIENT
Start: 2022-01-01 | End: 2022-01-01 | Stop reason: HOSPADM

## 2022-01-01 RX ORDER — LIDOCAINE 4 G/G
2 PATCH TOPICAL EVERY 24 HOURS
Qty: 30 PATCH | Refills: 0 | Status: SHIPPED | OUTPATIENT
Start: 2022-01-01 | End: 2023-01-01

## 2022-01-01 RX ORDER — LEVOFLOXACIN 250 MG/1
250 TABLET, FILM COATED ORAL EVERY 24 HOURS
Status: DISCONTINUED | OUTPATIENT
Start: 2022-01-01 | End: 2022-01-01

## 2022-01-01 RX ORDER — LEVOFLOXACIN 5 MG/ML
500 INJECTION, SOLUTION INTRAVENOUS EVERY 24 HOURS
Status: DISCONTINUED | OUTPATIENT
Start: 2022-01-01 | End: 2022-01-01 | Stop reason: ALTCHOICE

## 2022-01-01 RX ORDER — LIDOCAINE 4 G/G
1-3 PATCH TOPICAL
Status: DISCONTINUED | OUTPATIENT
Start: 2022-01-01 | End: 2022-01-01

## 2022-01-01 RX ORDER — VITAMIN B COMPLEX
1 TABLET ORAL DAILY
COMMUNITY

## 2022-01-01 RX ORDER — PROCHLORPERAZINE MALEATE 5 MG
5 TABLET ORAL EVERY 6 HOURS PRN
Status: DISCONTINUED | OUTPATIENT
Start: 2022-01-01 | End: 2022-01-01 | Stop reason: HOSPADM

## 2022-01-01 RX ORDER — ONDANSETRON 2 MG/ML
4 INJECTION INTRAMUSCULAR; INTRAVENOUS ONCE
Status: COMPLETED | OUTPATIENT
Start: 2022-01-01 | End: 2022-01-01

## 2022-01-01 RX ORDER — MAGNESIUM HYDROXIDE/ALUMINUM HYDROXICE/SIMETHICONE 120; 1200; 1200 MG/30ML; MG/30ML; MG/30ML
30 SUSPENSION ORAL EVERY 4 HOURS PRN
Status: DISCONTINUED | OUTPATIENT
Start: 2022-01-01 | End: 2022-01-01 | Stop reason: HOSPADM

## 2022-01-01 RX ORDER — VITAMIN B COMPLEX
25 TABLET ORAL DAILY
Status: DISCONTINUED | OUTPATIENT
Start: 2022-01-01 | End: 2022-01-01 | Stop reason: HOSPADM

## 2022-01-01 RX ORDER — ONDANSETRON 4 MG/1
4 TABLET, ORALLY DISINTEGRATING ORAL EVERY 6 HOURS PRN
Status: DISCONTINUED | OUTPATIENT
Start: 2022-01-01 | End: 2022-01-01

## 2022-01-01 RX ORDER — LANOLIN ALCOHOL/MO/W.PET/CERES
3 CREAM (GRAM) TOPICAL
Status: DISCONTINUED | OUTPATIENT
Start: 2022-01-01 | End: 2022-01-01 | Stop reason: HOSPADM

## 2022-01-01 RX ORDER — ACETAMINOPHEN 325 MG/1
650 TABLET ORAL EVERY 6 HOURS PRN
Status: DISCONTINUED | OUTPATIENT
Start: 2022-01-01 | End: 2022-01-01

## 2022-01-01 RX ORDER — HYDROMORPHONE HYDROCHLORIDE 2 MG/1
2 TABLET ORAL 3 TIMES DAILY PRN
Qty: 10 TABLET | Refills: 0 | Status: SHIPPED | OUTPATIENT
Start: 2022-01-01 | End: 2022-01-01

## 2022-01-01 RX ORDER — OXYCODONE HYDROCHLORIDE 5 MG/1
5 TABLET ORAL ONCE
Status: DISCONTINUED | OUTPATIENT
Start: 2022-01-01 | End: 2022-01-01

## 2022-01-01 RX ORDER — HEPARIN SODIUM 5000 [USP'U]/.5ML
5000 INJECTION, SOLUTION INTRAVENOUS; SUBCUTANEOUS EVERY 12 HOURS
Status: DISCONTINUED | OUTPATIENT
Start: 2022-01-01 | End: 2022-01-01 | Stop reason: HOSPADM

## 2022-01-01 RX ORDER — ACETAMINOPHEN 325 MG/1
975 TABLET ORAL 3 TIMES DAILY
COMMUNITY
Start: 2022-01-01

## 2022-01-01 RX ORDER — HYDROMORPHONE HYDROCHLORIDE 2 MG/1
2 TABLET ORAL EVERY 4 HOURS PRN
Status: DISCONTINUED | OUTPATIENT
Start: 2022-01-01 | End: 2022-01-01 | Stop reason: HOSPADM

## 2022-01-01 RX ORDER — ACETAMINOPHEN 650 MG/1
650 SUPPOSITORY RECTAL EVERY 6 HOURS PRN
Status: DISCONTINUED | OUTPATIENT
Start: 2022-01-01 | End: 2022-01-01

## 2022-01-01 RX ORDER — ACETAMINOPHEN 325 MG/1
975 TABLET ORAL EVERY 8 HOURS
Status: DISCONTINUED | OUTPATIENT
Start: 2022-01-01 | End: 2022-01-01

## 2022-01-01 RX ORDER — GABAPENTIN 300 MG/1
300 CAPSULE ORAL 3 TIMES DAILY
Status: DISCONTINUED | OUTPATIENT
Start: 2022-01-01 | End: 2022-01-01 | Stop reason: HOSPADM

## 2022-01-01 RX ORDER — LEVOFLOXACIN 5 MG/ML
250 INJECTION, SOLUTION INTRAVENOUS EVERY 24 HOURS
Status: DISCONTINUED | OUTPATIENT
Start: 2022-01-01 | End: 2022-01-01

## 2022-01-01 RX ORDER — POLYETHYLENE GLYCOL 3350 17 G/17G
17 POWDER, FOR SOLUTION ORAL DAILY
Qty: 30 PACKET | Refills: 0 | Status: SHIPPED | OUTPATIENT
Start: 2022-01-01 | End: 2023-01-01

## 2022-01-01 RX ORDER — ACETAMINOPHEN 325 MG/1
975 TABLET ORAL 3 TIMES DAILY
Status: DISCONTINUED | OUTPATIENT
Start: 2022-01-01 | End: 2022-01-01 | Stop reason: HOSPADM

## 2022-01-01 RX ORDER — LIDOCAINE 4 G/G
1-3 PATCH TOPICAL
Status: DISCONTINUED | OUTPATIENT
Start: 2022-01-01 | End: 2022-01-01 | Stop reason: HOSPADM

## 2022-01-01 RX ORDER — ONDANSETRON 2 MG/ML
4 INJECTION INTRAMUSCULAR; INTRAVENOUS EVERY 6 HOURS PRN
Status: DISCONTINUED | OUTPATIENT
Start: 2022-01-01 | End: 2022-01-01

## 2022-01-01 RX ORDER — IBUPROFEN 400 MG/1
400 TABLET, FILM COATED ORAL EVERY 8 HOURS PRN
Status: DISCONTINUED | OUTPATIENT
Start: 2022-01-01 | End: 2022-01-01 | Stop reason: HOSPADM

## 2022-01-01 RX ADMIN — GABAPENTIN 300 MG: 300 CAPSULE ORAL at 08:06

## 2022-01-01 RX ADMIN — Medication 25 MCG: at 09:52

## 2022-01-01 RX ADMIN — HYDROMORPHONE HYDROCHLORIDE 2 MG: 2 TABLET ORAL at 16:09

## 2022-01-01 RX ADMIN — DICLOFENAC SODIUM 2 G: 10 GEL TOPICAL at 20:37

## 2022-01-01 RX ADMIN — HEPARIN SODIUM 5000 UNITS: 10000 INJECTION, SOLUTION INTRAVENOUS; SUBCUTANEOUS at 09:22

## 2022-01-01 RX ADMIN — HYDROMORPHONE HYDROCHLORIDE 0.2 MG: 1 INJECTION, SOLUTION INTRAMUSCULAR; INTRAVENOUS; SUBCUTANEOUS at 19:24

## 2022-01-01 RX ADMIN — HYDROMORPHONE HYDROCHLORIDE 2 MG: 2 TABLET ORAL at 00:34

## 2022-01-01 RX ADMIN — DICLOFENAC SODIUM 2 G: 10 GEL TOPICAL at 09:32

## 2022-01-01 RX ADMIN — Medication 25 MCG: at 08:07

## 2022-01-01 RX ADMIN — HYDROMORPHONE HYDROCHLORIDE 0.2 MG: 1 INJECTION, SOLUTION INTRAMUSCULAR; INTRAVENOUS; SUBCUTANEOUS at 15:10

## 2022-01-01 RX ADMIN — CALCIUM CARBONATE-VITAMIN D TAB 500 MG-200 UNIT 1 TABLET: 500-200 TAB at 08:04

## 2022-01-01 RX ADMIN — SODIUM CHLORIDE 1000 ML: 9 INJECTION, SOLUTION INTRAVENOUS at 17:35

## 2022-01-01 RX ADMIN — HYDROMORPHONE HYDROCHLORIDE 2 MG: 2 TABLET ORAL at 19:17

## 2022-01-01 RX ADMIN — GABAPENTIN 300 MG: 300 CAPSULE ORAL at 09:21

## 2022-01-01 RX ADMIN — DICLOFENAC SODIUM 2 G: 10 GEL TOPICAL at 21:10

## 2022-01-01 RX ADMIN — CALCIUM CARBONATE-VITAMIN D TAB 500 MG-200 UNIT 1 TABLET: 500-200 TAB at 09:32

## 2022-01-01 RX ADMIN — DICLOFENAC SODIUM 2 G: 10 GEL TOPICAL at 17:06

## 2022-01-01 RX ADMIN — HEPARIN SODIUM 5000 UNITS: 10000 INJECTION, SOLUTION INTRAVENOUS; SUBCUTANEOUS at 08:14

## 2022-01-01 RX ADMIN — HYDROMORPHONE HYDROCHLORIDE 0.5 MG: 1 INJECTION, SOLUTION INTRAMUSCULAR; INTRAVENOUS; SUBCUTANEOUS at 20:48

## 2022-01-01 RX ADMIN — HYDROMORPHONE HYDROCHLORIDE 2 MG: 2 TABLET ORAL at 11:03

## 2022-01-01 RX ADMIN — HYDROMORPHONE HYDROCHLORIDE 2 MG: 2 TABLET ORAL at 06:57

## 2022-01-01 RX ADMIN — IBUPROFEN 400 MG: 400 TABLET, FILM COATED ORAL at 09:52

## 2022-01-01 RX ADMIN — CALCIUM CARBONATE-VITAMIN D TAB 500 MG-200 UNIT 1 TABLET: 500-200 TAB at 09:52

## 2022-01-01 RX ADMIN — ONDANSETRON 4 MG: 2 INJECTION INTRAMUSCULAR; INTRAVENOUS at 17:32

## 2022-01-01 RX ADMIN — LEVOFLOXACIN 250 MG: 5 INJECTION, SOLUTION INTRAVENOUS at 20:35

## 2022-01-01 RX ADMIN — GABAPENTIN 300 MG: 300 CAPSULE ORAL at 09:32

## 2022-01-01 RX ADMIN — HYDROMORPHONE HYDROCHLORIDE 2 MG: 2 TABLET ORAL at 11:57

## 2022-01-01 RX ADMIN — GABAPENTIN 300 MG: 300 CAPSULE ORAL at 20:36

## 2022-01-01 RX ADMIN — LIDOCAINE 1 PATCH: 246 PATCH TOPICAL at 09:21

## 2022-01-01 RX ADMIN — HYDROMORPHONE HYDROCHLORIDE 2 MG: 2 TABLET ORAL at 14:42

## 2022-01-01 RX ADMIN — GABAPENTIN 300 MG: 300 CAPSULE ORAL at 19:17

## 2022-01-01 RX ADMIN — Medication 3 MG: at 03:18

## 2022-01-01 RX ADMIN — LEVOFLOXACIN 500 MG: 5 INJECTION, SOLUTION INTRAVENOUS at 19:41

## 2022-01-01 RX ADMIN — GABAPENTIN 300 MG: 300 CAPSULE ORAL at 14:07

## 2022-01-01 RX ADMIN — HYDROMORPHONE HYDROCHLORIDE 2 MG: 2 TABLET ORAL at 06:52

## 2022-01-01 RX ADMIN — LIDOCAINE 1 PATCH: 246 PATCH TOPICAL at 09:33

## 2022-01-01 RX ADMIN — PROCHLORPERAZINE EDISYLATE 5 MG: 5 INJECTION INTRAMUSCULAR; INTRAVENOUS at 09:58

## 2022-01-01 RX ADMIN — LEVOFLOXACIN 250 MG: 250 TABLET, FILM COATED ORAL at 19:20

## 2022-01-01 RX ADMIN — HYDROMORPHONE HYDROCHLORIDE 0.2 MG: 1 INJECTION, SOLUTION INTRAMUSCULAR; INTRAVENOUS; SUBCUTANEOUS at 01:49

## 2022-01-01 RX ADMIN — GABAPENTIN 300 MG: 300 CAPSULE ORAL at 14:41

## 2022-01-01 RX ADMIN — GABAPENTIN 300 MG: 300 CAPSULE ORAL at 14:00

## 2022-01-01 RX ADMIN — HYDROMORPHONE HYDROCHLORIDE 2 MG: 2 TABLET ORAL at 00:21

## 2022-01-01 RX ADMIN — CALCIUM CARBONATE-VITAMIN D TAB 500 MG-200 UNIT 1 TABLET: 500-200 TAB at 19:17

## 2022-01-01 RX ADMIN — DICLOFENAC SODIUM 2 G: 10 GEL TOPICAL at 16:10

## 2022-01-01 RX ADMIN — HYDROMORPHONE HYDROCHLORIDE 0.5 MG: 1 INJECTION, SOLUTION INTRAMUSCULAR; INTRAVENOUS; SUBCUTANEOUS at 17:34

## 2022-01-01 RX ADMIN — HYDROMORPHONE HYDROCHLORIDE 0.2 MG: 1 INJECTION, SOLUTION INTRAMUSCULAR; INTRAVENOUS; SUBCUTANEOUS at 11:02

## 2022-01-01 RX ADMIN — HYDROMORPHONE HYDROCHLORIDE 2 MG: 2 TABLET ORAL at 06:27

## 2022-01-01 RX ADMIN — DICLOFENAC SODIUM 2 G: 10 GEL TOPICAL at 11:59

## 2022-01-01 RX ADMIN — Medication 25 MCG: at 09:32

## 2022-01-01 RX ADMIN — HEPARIN SODIUM 5000 UNITS: 10000 INJECTION, SOLUTION INTRAVENOUS; SUBCUTANEOUS at 20:34

## 2022-01-01 RX ADMIN — CALCIUM CARBONATE-VITAMIN D TAB 500 MG-200 UNIT 1 TABLET: 500-200 TAB at 20:36

## 2022-01-01 ASSESSMENT — ACTIVITIES OF DAILY LIVING (ADL)
DEPENDENT_IADLS:: CLEANING;COOKING;LAUNDRY;SHOPPING;MEAL PREPARATION;TRANSPORTATION
PREVIOUS_RESPONSIBILITIES: MEAL PREP;HOUSEKEEPING;LAUNDRY;MEDICATION MANAGEMENT;FINANCES

## 2022-01-01 ASSESSMENT — ENCOUNTER SYMPTOMS
NUMBNESS: 0
VOMITING: 0
HEMATURIA: 0
NAUSEA: 0
WEAKNESS: 0
COUGH: 0
DYSURIA: 0
FEVER: 1
FLANK PAIN: 1
DIARRHEA: 0
SHORTNESS OF BREATH: 0

## 2022-01-01 ASSESSMENT — MIFFLIN-ST. JEOR: SCORE: 855.92

## 2022-01-31 NOTE — PROGRESS NOTES
Phone visit    Start time 1:08, stop time 1:28; additional 22 minutes spent on the date of the encounter doing chart review, documentation and further activities as noted.     Provider location: [facility], [address] [place] [address] [address], [place] [address]    Patient location: patient home    Mode of transmission: phone    Subjective:    Established patient    Shirley Phipps is a 86 year old female who presents to review management of osteoporosis.    Interim updates:  -She had teeth extracted 11/1/2021 and subsequently met with her dentist and healing was confirmed     Objective:    Phone visit.    Assessment/Plan:    # Severe osteoporosis with multiple prior low impact fractures including multiple vertebral compression fractures  # Mild hyperparathyroidism, prior intermittent mild hypercalcemia  # Nephrolithiasis, non-obstructing and seen on imaging     At this time, Pily has completed a complete evaluation of secondary causes of osteoporosis except for: 24 hour urine calcium, UPEP. Notable findings include: PTH 95 (ULN 86 pg/mL), with prior mild hypercalcemia (peak calcium 11.2 mg/dL with ULN 10.4). Serum calcium has been normal on multiple draws since 3/2021. Serum phosphorus always normal previously. GFR always >60. No known prior vitamin D deficiency. Vitamin D 36 (ref range 30 - 80 mcg/L) 9/2021.     She may have mild primary hyperparathyroidism, however this is unclear.  We discussed that generally if a patient has primary hyperparathyroidism and surgical indications such as osteoporosis and nephrolithiasis we would recommend surgery.  However given Shirley's age and comorbidities she has a strong preference to avoid surgery and I agree with this.    We again reviewed pharmacologic therapy to reduce fracture risk, including anti-resorptive and anabolic therapy. Given the severity of her osteoporosis, we agree on Evenity.  I also would not use Forteo or Tymlos in the setting of likely  parathyroid autonomy. We reviewed the potential adverse effects of Evenity including association with an ASCVD event, osteonecrosis of the jaw, atypical femoral fracture, and hypocalcemia. We agree the benefits outweigh the risks. Evenity was previously approved but since her insurance has changed, I will re prescribe it. After 12 injections, she will need transition to anti-resorptive therapy.    Prior to receiving her first injection, I would like her to update labs: PTH with minerals, Cr, vit D, 24-hour urine calcium, and per the prior Heme E-consult will get SPEP, serum light chains, UPEP. Didn't order DEXA since it won't  here.     She doesn't know the dose of her vitamin D and calcium supplements. When we call her to set up Evenity injections, we will clarify this.

## 2022-01-31 NOTE — TELEPHONE ENCOUNTER
[facility] Call Center    Phone Message    May a detailed message be left on voicemail: yes     Reason for Call: Other: Pt had missed a call from the clinic earlier, but her phone for some reason wouldn't let her answer it. Her phone appears to be working now, and she wanted to let the care team know to try and call back. (pt's telephone appt at 1pm today)     Action Taken: Message routed to:  Other: endocrine    Travel Screening: Not Applicable

## 2022-01-31 NOTE — LETTER
1/31/2022         RE: Shirley Phipps  [address]  [facility] [address]        Dear Colleague,    Thank you for referring your patient, Shirley Phipps, to the [facility]. Please see a copy of my visit note below.    Phone visit    Start time 1:08, stop time 1:28; additional 22 minutes spent on the date of the encounter doing chart review, documentation and further activities as noted.     Provider location: [facility], [address] [place] [id number]    Patient location: patient home    Mode of transmission: phone    Subjective:    Established patient    Shirley Phipps is a 86 year old female who presents to review management of osteoporosis.    Interim updates:  -She had teeth extracted 11/1/2021 and subsequently met with her dentist and healing was confirmed     Objective:    Phone visit.    Assessment/Plan:    # Severe osteoporosis with multiple prior low impact fractures including multiple vertebral compression fractures  # Mild hyperparathyroidism, prior intermittent mild hypercalcemia  # Nephrolithiasis, non-obstructing and seen on imaging     At this time, Pily has completed a complete evaluation of secondary causes of osteoporosis except for: 24 hour urine calcium, UPEP. Notable findings include: PTH 95 (ULN 86 pg/mL), with prior mild hypercalcemia (peak calcium 11.2 mg/dL with ULN 10.4). Serum calcium has been normal on multiple draws since 3/2021. Serum phosphorus always normal previously. GFR always >60. No known prior vitamin D deficiency. Vitamin D 36 (ref range 30 - 80 mcg/L) 9/2021.     She may have mild primary hyperparathyroidism, however this is unclear.  We discussed that generally if a patient has primary hyperparathyroidism and surgical indications such as osteoporosis and nephrolithiasis we would recommend surgery.  However given Shirley's age and comorbidities she has a strong preference to avoid surgery and  I agree with this.    We again reviewed pharmacologic therapy to reduce fracture risk, including anti-resorptive and anabolic therapy. Given the severity of her osteoporosis, we agree on Evenity.  I also would not use Forteo or Tymlos in the setting of likely parathyroid autonomy. We reviewed the potential adverse effects of Evenity including association with an ASCVD event, osteonecrosis of the jaw, atypical femoral fracture, and hypocalcemia. We agree the benefits outweigh the risks. Evenity was previously approved but since her insurance has changed, I will re prescribe it. After 12 injections, she will need transition to anti-resorptive therapy.    Prior to receiving her first injection, I would like her to update labs: PTH with minerals, Cr, vit D, 24-hour urine calcium, and per the prior Heme E-consult will get SPEP, serum light chains, UPEP. Didn't order DEXA since it won't  here.     She doesn't know the dose of her vitamin D and calcium supplements. When we call her to set up Evenity injections, we will clarify this.           Again, thank you for allowing me to participate in the care of your patient.        Sincerely,        Ashok Reyes MD

## 2022-02-10 NOTE — TELEPHONE ENCOUNTER
Dr Reyes & Team    Pt has 2 back fractures and was at urgent care for 4 hours recently. She states taking a deep breath hurts, and eating hurts. When she was at the urgent care, they recommended for pt to go to ER, but pt declined.    It is very hard for pt to get in and out of car. She is also going through a UTI and is taking ABX.    A phone appt made for 2/28. wondering if she can get a sooner appt to discuss care and next steps.     She can be reached @ [phone]

## 2022-02-10 NOTE — TELEPHONE ENCOUNTER
RAY Reyes reviewed the pt's labs-calcium and creatinine, both WNL, ok tp schedule evenity. Pt informed and scheduled.

## 2022-02-10 NOTE — TELEPHONE ENCOUNTER
I called the pt and informed Dr Reyes would like her to perform the labs prior to starting evenity, which was approved. The pt will f/u with her PCP regarding pain, breathing, and UTI f/u. Pt will c/b to schedule a lab appt.    Suturegard Retention Suture: 2-0 Nylon

## 2022-02-14 PROBLEM — S22.080A COMPRESSION FRACTURE OF T11 VERTEBRA, INITIAL ENCOUNTER (H): Status: ACTIVE | Noted: 2022-01-01

## 2022-02-14 PROBLEM — N12 PYELONEPHRITIS: Status: ACTIVE | Noted: 2022-01-01

## 2022-02-14 PROBLEM — S22.060A COMPRESSION FRACTURE OF T8 VERTEBRA, INITIAL ENCOUNTER (H): Status: ACTIVE | Noted: 2022-01-01

## 2022-02-14 PROBLEM — S22.030A COMPRESSION FRACTURE OF T3 VERTEBRA, INITIAL ENCOUNTER (H): Status: ACTIVE | Noted: 2022-01-01

## 2022-02-14 PROBLEM — S32.010A COMPRESSION FRACTURE OF L1 VERTEBRA, INITIAL ENCOUNTER (H): Status: ACTIVE | Noted: 2022-01-01

## 2022-02-14 PROBLEM — S32.020A COMPRESSION FRACTURE OF L2 VERTEBRA, INITIAL ENCOUNTER (H): Status: ACTIVE | Noted: 2022-01-01

## 2022-02-14 NOTE — ED TRIAGE NOTES
Pt brought by [facility] from home for evaluation of right flank pain x 1 week. Pt states about a week ago she injured her back while trying to get something out of the bottom freezer at home. Pt reportedly has several vertebral fractures, and states she has had right sided back pain, but today she developed a fever of 102 at home and was concerned about a kidney infection.  Denies urinary symptoms, perhaps nausea but no vomiting. Pt took ibuprofen for fever this afternoon.  Pt given 4mg zofran and 8mg morphine IV by EMS.

## 2022-02-14 NOTE — ED NOTES
Bed: JNED-05  Expected date: 2/14/22  Expected time: 3:47 PM  Means of arrival: Ambulance  Comments:  [place],  86 F fever CP, kidney infection

## 2022-02-15 PROBLEM — N39.0 RECURRENT UTI (URINARY TRACT INFECTION): Status: ACTIVE | Noted: 2021-07-20

## 2022-02-15 NOTE — ED NOTES
[facility] ED Handoff Report    ED Chief Complaint: right flank pain, mid to low back pain    ED Diagnosis:  (S22.030A) Compression fracture of T3 vertebra, initial encounter (H)  Comment: n/a  Plan: n/a    (S22.060A) Compression fracture of T8 vertebra, initial encounter (H)  Comment: n/a  Plan: n/a    (S22.080A) Compression fracture of T11 vertebra, initial encounter (H)  Comment: Pt wearing custom fit TLSO brace  Plan: n/a    (S32.010A) Compression fracture of L1 vertebra, initial encounter (H)  Comment: n/a  Plan: n/a    (S32.020A) Compression fracture of L2 vertebra, initial encounter (H)  Comment: na  Plan: n/a    (R10.9) Right flank pain  Comment: pyelnephritis  Plan: n/a       PMH:    Past Medical History:   Diagnosis Date     Anemia      Aortic stenosis      Arthritis      Bicuspid aortic valve      Bladder infection      Cancer (H)     SQUAMOUS 2X     Closed burst fracture of lumbar vertebra (H) 03/26/2019    L4 BURST FX WITH RETROPULSION     Dyslipidemia      Fall 03/26/2019     GERD (gastroesophageal reflux disease)      History of transfusion     SHE DOESN'T THINK SHE HAD A TRANSFUSION REACTION     Hypertension         Code Status:  Prior     Falls Risk: Yes Band: Applied    Current Living Situation/Residence: lives alone     Elimination Status: Continent: Yes     Activity Level: 2 assist    Patients Preferred Language:  English     Needed: No    Vital Signs:  /57   Pulse 71   Temp 98  F (36.7  C) (Oral)   Resp 14   Wt 43.1 kg (95 lb)   SpO2 98%   BMI 17.04 kg/m       Cardiac Rhythm: pulse regular    Pain Score: 7/10    Is the Patient Confused:  No    Last Food or Drink: 02/15/22 at 1200 noon    Focused Assessment:  Pt is AAOx3, LADD.Good pulses to all extremities. Denies tingling/numbness to all extremities. Pt has compression fractures (osteo, no falls) to L1P3G56O3O8. TLSO brace brought in around 1300 by sonEffie Varghese has a consult in for the TLSO Lever to adjust it  since pt has lost weight. Lungs clear. Abdomen soft. Pt c/o pain at 10/10 before narcotics and 7/10 after narcotics. Pt eating most of her meals. Did give her compazine IV this am just before giving narcotics. Face is red/upper lip red d/t h/a CA.    Tests Performed: Done: Labs and Imaging    Treatments Provided:  Meds, PT    Family Dynamics/Concerns: No    Family Updated On Visitor Policy: Yes    Plan of Care Communicated to Family: Yes    Who Was Updated about Plan of Care: Son, per pt    Belongings Checklist Done and Signed by Patient: Yes    Medications sent with patient: n/a    Covid: asymptomatic , negative    Additional Information: Pt has her phone and  and is able to contact her son    RN: Dania Oseguera 2/15/2022 2:09 PM

## 2022-02-15 NOTE — CONSULTS
Care Management Initial Consult    General Information  Assessment completed with: Patient, pt  Type of CM/SW Visit: Initial Assessment    Primary Care Provider verified and updated as needed: Yes   Readmission within the last 30 days: no previous admission in last 30 days   Return Category: Progression of disease  Reason for Consult: discharge planning  Advance Care Planning: Advance Care Planning Reviewed: no concerns identified          Communication Assessment  Patient's communication style: spoken language (English or Bilingual)    Hearing Difficulty or Deaf: no   Wear Glasses or Blind: no    Cognitive  Cognitive/Neuro/Behavioral: WDL                      Living Environment:   People in home: alone,child(rabia), adult     Current living Arrangements: house      Able to return to prior arrangements: yes       Family/Social Support:  Care provided by: self,child(rabia)  Provides care for: no one,no one, unable/limited ability to care for self  Marital Status: Single  Children          Description of Support System: Supportive,Involved    Support Assessment: Adequate family and caregiver support,Adequate social supports    Current Resources:   Patient receiving home care services: No     Community Resources: DME  Equipment currently used at home: walker, rolling  Supplies currently used at home: Incontinence Supplies    Employment/Financial:  Employment Status:          Financial Concerns: No concerns identified   Referral to Financial Counselor: No       Lifestyle & Psychosocial Needs:  Social Determinants of Health     Tobacco Use: Low Risk      Smoking Tobacco Use: Never Smoker     Smokeless Tobacco Use: Never Used   Alcohol Use: Not on file   Financial Resource Strain: Not on file   Food Insecurity: Not on file   Transportation Needs: Not on file   Physical Activity: Not on file   Stress: Not on file   Social Connections: Not on file   Intimate Partner Violence: Not on file   Depression: Not on file   Housing  Stability: Not on file       Functional Status:  Prior to admission patient needed assistance:   Dependent ADLs:: Ambulation-walker,Incontinence,Positioning  Dependent IADLs:: Cleaning,Cooking,Laundry,Shopping,Meal Preparation,Transportation  Assesssment of Functional Status: Not at baseline with ADL Functioning,Not at baseline with mobility    Mental Health Status:          Chemical Dependency Status:                Values/Beliefs:  Spiritual, Cultural Beliefs, Mu-ism Practices, Values that affect care:                 Additional Information:  FELY assessed, lives alone in one level townhouse, son staying to help and are transporting at discharge, has TLSO brace and no other svcs.  Discussed GONZÁLES. Son Jay has dropped off her TLSO brace.    Ed Guerrero RN

## 2022-02-15 NOTE — ED NOTES
Pt given sandwich, fruit and applesauce. Has water to drink. Medicated for increasing back/flank pain. Pt otherwise calm, pleasant and conversational.

## 2022-02-15 NOTE — ED NOTES
Pt. Alert and oriented. Medications administered as ordered. Breakfast ordered. Abdomen is bloated. Pt. Complained of abdomen being distended more than normal. Bowel sounds positive x4 quadrants. VSS.

## 2022-02-15 NOTE — CONSULTS
[facility] ACUTE PAIN SERVICE    ([facility], [facility], [facility])   Consult Note    Date of Admission:  2/14/2022  Date of Consult: 02/15/22  Physician requesting consult: PRINCESS Tapia MD   Reason for consult: acute upon back chronic pain     Assessment/Plan:     Shirley Phipps is a 86 year old female who was admitted on 2/14/2022.   Pain Service is asked to see the patient for acute upon chronic back pain. Admitted for evaluation of right flank pain with onset of fever.  Patient had recently been diagnosed with UT and compression fracture one week ago. History of osteoporosis, osteoarthritis, nephrolithiasis, acute kidney injury, UTI, aortic stenosis, and compression fractures of thoracic and lumbar spine.     Patient stated that she came into the hospital because she was concerned about UTI going into her kidneys after she developed fevers.      Neurosurgery Consult not a surgical candidate, recommend refitting of TLSO brace, continue PT/OT, conservative management.    She states that she uses the TLSO brace at home and does find it useful.  From chart review there has been concern that patient is not compliant with using the brace as prescribed.      PLAN:   1) Pain is consistent with acute upon chronic pain with history of osteoarthritis, hips and knee as well as severe osteoporosis with compression fractures.   and The patient's home MME was 0 mg daily.   2)Multimodal Medication Therapy  Topical: Lidocaine patch every hs, Voltaren cream to back, hips and knee qid   NSAID'S: ibuprofen 400 mg every 8 hours prn   Muscle Relaxants: consider  Adjuvants: acetaminophen 975 mg tid, gabapentin 300 mg tid  Antidepressants/anxiolytics:  Opioids: hydromorphone 2 mg every 4 hours prn (may need to schedule)   IV Pain medication:  Hydromorphone 0.2 mg IV every four hours prn (for 24 hours then will stop)  3)Non-medication interventions ice, heat  Acupuncture consult- as available Mon and  Thursday  Integrative consult - please offer  4)Constipation Prophylaxis  Daily stool softener/laxative   5) Follow up   -Opioid prescriber has been none  -Discharge Recommendations - We recommend prescribing the following at the time of discharge: TBD  optimize non opioid therapies          History of Present Illness (HPI):       Shirley Phipps is a 86 year old  female with acute upon chronic pain.  The pain is reported to be acute back pain felt increase after bending over to get something out of freezer.  Pain is constant.  Does use heating pad and ice packs which are helpful.    Pain is constant at rest increases  with movement.       MN  pulled from system on 02/15/22. Last refill of opioids was 8/20/21 [facility] 15 mg tablets 20 tablets for 5 days.  Patient had 3(15mg)  [facility] prescriptions in August 2021 for total of 37 tablets.   Otherwise patient has regular prescriptions for gabapentin 300 mg capsule   Last was 300 mg capsules  #60 for 10 days.   Most common prescriber for gabapentin is Primary Provider Desirae Thomson MD.  Oxycodone prescriptions were from Clara Mitchell (Neurosurgery NP) and Sindy Murphy MD Emergency Medicine.        Medical History  Patient Active Problem List    Diagnosis Date Noted     Pyelonephritis 02/14/2022     Priority: Medium     Compression fracture of T11 vertebra, initial encounter (H) 02/14/2022     Priority: Medium     Compression fracture of T3 vertebra, initial encounter (H) 02/14/2022     Priority: Medium     Compression fracture of T8 vertebra, initial encounter (H) 02/14/2022     Priority: Medium     Compression fracture of L1 vertebra, initial encounter (H) 02/14/2022     Priority: Medium     Compression fracture of L2 vertebra, initial encounter (H) 02/14/2022     Priority: Medium     Primary osteoarthritis of right hip 11/19/2021     Priority: Medium     Added automatically from request for surgery [id number]       Primary osteoarthritis of right knee 11/19/2021      Priority: Medium     Added automatically from request for surgery [id number]       Age-related osteoporosis with current pathological fracture with routine healing, subsequent encounter 10/04/2021     Priority: Medium     Closed wedge compression fracture of L2 vertebra with routine healing, subsequent encounter 10/04/2021     Priority: Medium     Anemia, unspecified type 10/04/2021     Priority: Medium     Compression fracture of T11 vertebra with routine healing, subsequent encounter 10/04/2021     Priority: Medium     Compression fracture of L1 vertebra with routine healing, subsequent encounter 10/04/2021     Priority: Medium     Nephrolithiasis 10/04/2021     Priority: Medium     Hypercalcemia 10/04/2021     Priority: Medium     Tooth decay 10/04/2021     Priority: Medium     Closed compression fracture of L4 lumbar vertebra, sequela 10/04/2021     Priority: Medium     Hypoxia 08/07/2021     Priority: Medium     Fever, unspecified fever cause 08/07/2021     Priority: Medium     Calculus of ureter 07/20/2021     Priority: Medium     Added automatically from request for surgery [id number]       Recurrent UTI (urinary tract infection) 07/20/2021     Priority: Medium     Added automatically from request for surgery [id number]          Surgical History  She  has a past surgical history that includes other surgical history (Right); other surgical history (Right); Picc (12/25/2016); RECONSTR TOTAL SHOULDER IMPLANT (Left, 12/26/2016); and Combined Cystoscopy, Insert Stent Ureter(s) (Right, 8/4/2021).     Past Surgical History:   Procedure Laterality Date     COMBINED CYSTOSCOPY, INSERT STENT URETER(S) Right 8/4/2021    Procedure: CYSTOURETEROSCOPY, RETROGRADE PYLEOGRAM RIGHT ;  Surgeon: Marbin Liu MD;  Location: [facility] OR     OTHER SURGICAL HISTORY Right     surgery for prior wrist fracture     OTHER SURGICAL HISTORY Right     surgery for prior knee fracture     PIC  12/25/2016          ZZC RECONSTR TOTAL  SHOULDER IMPLANT Left 12/26/2016    Procedure: LEFT REVERSE TOTAL SHOULDER ARTHROPLASTY;  Surgeon: Maurilio Marcelino MD;  Location: [facility];  Service: Orthopedics       Allergies  Allergies   Allergen Reactions     Allopurinol Other (See Comments)     Cefdinir Hives     Clindamycin Nausea     C.Diff     Codeine Nausea and Vomiting     Tolerates oral morphine.     Flagyl [Metronidazole] Angioedema     Sulfa (Sulfonamide Antibiotics) [Sulfa Drugs] Nausea     Canker sores     Tramadol Unknown     Upset stomach       Prior to Admission Medications   (Not in a hospital admission)      Social History  Reviewed, and she  reports that she has never smoked. She has never used smokeless tobacco. She reports current alcohol use of about 7.0 standard drinks of alcohol per week. She reports that she does not use drugs.  Social History     Tobacco Use     Smoking status: Never Smoker     Smokeless tobacco: Never Used   Substance Use Topics     Alcohol use: Yes     Alcohol/week: 7.0 standard drinks     Comment: Alcoholic Drinks/day: 1 shot of bee daily       Family History  Reviewed, and family history includes Breast Cancer in her mother; Diabetes in her brother and father; Diabetes Type 2  in an other family member.    Review of Systems  Complete ROS reviewed, unless noted, all other systems reviewed and found to be negative.        Objective:     Physical Exam:  /58   Pulse 76   Temp 98  F (36.7  C) (Oral)   Resp 14   Wt 43.1 kg (95 lb)   SpO2 99%   BMI 17.04 kg/m    Weight:   Weight change:   Body mass index is 17.04 kg/m .      General Appearance:  Alert, cooperative, pleasant, thin, frail female good historian   Head:  Normocephalic, without obvious abnormality, atraumatic   Eyes:  PERRL, conjunctiva/corneas clear, EOM's intact   ENT/Throat: Lips, mucosa, and tongue normal; teeth and gums normal   Lymph/Neck: Supple, symmetrical, trachea midline, no adenopathy, thyroid: not enlarged, symmetric, no  carotid bruit or JVD   Lungs:   Respirations unlabored   Chest Wall:  No tenderness or deformity   Cardiovascular/Heart:  Regular rate and rhythm Edema: none   Abdomen:   Soft, non-tender, bowel sounds    Musculoskeletal: Spine and extremities normal, deconditioned, limited mobility, ROM   Skin: Skin color, texture, turgor normal, no rashes or lesions   Neurologic: Reflexes intact, cooridanated movement       Psych: Affect is appropriate to circumstance            Imaging Reviewed   No results found.    Labs Reviewed Personally By Myself  Results for orders placed or performed during the hospital encounter of 02/14/22   Basic metabolic panel     Status: Abnormal   Result Value Ref Range    Sodium 137 136 - 145 mmol/L    Potassium 4.5 3.5 - 5.0 mmol/L    Chloride 108 (H) 98 - 107 mmol/L    Carbon Dioxide (CO2) 26 22 - 31 mmol/L    Anion Gap 3 (L) 5 - 18 mmol/L    Urea Nitrogen 20 8 - 28 mg/dL    Creatinine 0.77 0.60 - 1.10 mg/dL    Calcium 9.4 8.5 - 10.5 mg/dL    Glucose 92 70 - 125 mg/dL    GFR Estimate 75 >60 mL/min/1.73m2   Hepatic function panel     Status: Normal   Result Value Ref Range    Bilirubin Total 0.5 0.0 - 1.0 mg/dL    Bilirubin Direct 0.2 <=0.5 mg/dL    Protein Total 6.4 6.0 - 8.0 g/dL    Albumin 3.6 3.5 - 5.0 g/dL    Alkaline Phosphatase 48 45 - 120 U/L    AST 13 0 - 40 U/L    ALT <9 0 - 45 U/L   Lipase     Status: Normal   Result Value Ref Range    Lipase 11 0 - 52 U/L   UA with Microscopic reflex to Culture     Status: Abnormal    Specimen: Urine, Catheter   Result Value Ref Range    Color Urine Yellow Colorless, Straw, Light Yellow, Yellow    Appearance Urine Turbid (A) Clear    Glucose Urine Negative Negative mg/dL    Bilirubin Urine Negative Negative    Ketones Urine Negative Negative mg/dL    Specific Gravity Urine 1.021 1.001 - 1.030    Blood Urine 0.5 mg/dL (A) Negative    pH Urine 5.5 5.0 - 7.0    Protein Albumin Urine 20  (A) Negative mg/dL    Urobilinogen Urine <2.0 <2.0 mg/dL    Nitrite  Urine Negative Negative    Leukocyte Esterase Urine 250 Patsy/uL (A) Negative    Bacteria Urine Few (A) None Seen /HPF    WBC Clumps Urine Present (A) None Seen /HPF    Mucus Urine Present (A) None Seen /LPF    RBC Urine 73 (H) <=2 /HPF    WBC Urine 29 (H) <=5 /HPF    Narrative    Urine Culture ordered based on laboratory criteria   CRP inflammation     Status: Abnormal   Result Value Ref Range    CRP 1.2 (H) 0.0-<0.8 mg/dL   Lactic acid whole blood     Status: Normal   Result Value Ref Range    Lactic Acid 0.7 0.7 - 2.0 mmol/L   Asymptomatic COVID-19 Virus (Coronavirus) by PCR Nasopharyngeal     Status: Normal    Specimen: Nasopharyngeal; Swab   Result Value Ref Range    SARS CoV2 PCR Negative Negative    Narrative    Testing was performed using the karla  SARS-CoV-2 & Influenza A/B Assay on the karla  [employer]  System.  This test should be ordered for the detection of SARS-COV-2 in individuals who meet SARS-CoV-2 clinical and/or epidemiological criteria. Test performance is unknown in asymptomatic patients.  This test is for in vitro diagnostic use under the FDA EUA for laboratories certified under CLIA to perform moderate and/or high complexity testing. This test has not been FDA cleared or approved.  A negative test does not rule out the presence of PCR inhibitors in the specimen or target RNA in concentration below the limit of detection for the assay. The possibility of a false negative should be considered if the patient's recent exposure or clinical presentation suggests COVID-19.  [facility] are certified under the Clinical Laboratory Improvement Amendments of 1988 (CLIA-88) as qualified to perform moderate and/or high complexity laboratory testing.   Troponin I (now)     Status: Normal   Result Value Ref Range    Troponin I 0.25 0.00 - 0.29 ng/mL   CBC with platelets and differential     Status: Abnormal   Result Value Ref Range    WBC Count 5.7 4.0 - 11.0 10e3/uL    RBC Count 3.55 (L) 3.80 -  5.20 10e6/uL    Hemoglobin 10.5 (L) 11.7 - 15.7 g/dL    Hematocrit 33.2 (L) 35.0 - 47.0 %    MCV 94 78 - 100 fL    MCH 29.6 26.5 - 33.0 pg    MCHC 31.6 31.5 - 36.5 g/dL    RDW 13.1 10.0 - 15.0 %    Platelet Count 94 (L) 150 - 450 10e3/uL    % Neutrophils 67 %    % Lymphocytes 16 %    % Monocytes 16 %    % Eosinophils 0 %    % Basophils 0 %    % Immature Granulocytes 1 %    NRBCs per 100 WBC 0 <1 /100    Absolute Neutrophils 3.9 1.6 - 8.3 10e3/uL    Absolute Lymphocytes 0.9 0.8 - 5.3 10e3/uL    Absolute Monocytes 0.9 0.0 - 1.3 10e3/uL    Absolute Eosinophils 0.0 0.0 - 0.7 10e3/uL    Absolute Basophils 0.0 0.0 - 0.2 10e3/uL    Absolute Immature Granulocytes 0.1 <=0.4 10e3/uL    Absolute NRBCs 0.0 10e3/uL   Extra Blue Top Tube     Status: None   Result Value Ref Range    Hold Specimen [facility]    Extra Red Top Tube     Status: None   Result Value Ref Range    Hold Specimen [facility]    Blood Culture Peripheral Blood     Status: Normal (Preliminary result)    Specimen: Peripheral Blood   Result Value Ref Range    Culture No growth after 12 hours    CBC with platelets + differential     Status: Abnormal    Narrative    The following orders were created for panel order CBC with platelets + differential.  Procedure                               Abnormality         Status                     ---------                               -----------         ------                     CBC with platelets and d...[[id number]]  Abnormal            Final result                 Please view results for these tests on the individual orders.   [place] Draw     Status: None    Narrative    The following orders were created for panel order [place] Draw.  Procedure                               Abnormality         Status                     ---------                               -----------         ------                     Extra Blue Top Tube[[id number]]                              Final result               Extra Red Top Tube[[id number]]                                Final result                 Please view results for these tests on the individual orders.          Total time spent 82 minutes with greater than 50% in consultation, education and coordination of care.     Also discussed with RN    Thank you for this consultation.      Adilene LUNA, CNS-BC, DNP  Acute Care Pain Management Program  [facility] ([place], [place])   With questions call [phone]  Preference if for [facility] - Carol  Click HERE to page Marta

## 2022-02-15 NOTE — PHARMACY-ADMISSION MEDICATION HISTORY
Pharmacy Note - Admission Medication History    Pertinent Provider Information:      ______________________________________________________________________    Prior To Admission (PTA) med list completed and updated in EMR.       Current Facility-Administered Medications for the 2/14/22 encounter (Hospital Encounter)   Medication     romosozumab-aqqg (EVENITY) injection 210 mg     PTA Med List   Medication Sig Last Dose     Calcium Carb-Cholecalciferol (CALCIUM 500 + D) 500-125 MG-UNIT TABS Take 1 tablet by mouth 2 times daily  2/13/2022 at Unknown time     gabapentin (NEURONTIN) 300 MG capsule Take 300 mg by mouth 3 times daily  2/14/2022 at 12pm     IBUPROFEN PO Take 400 mg by mouth every 8 hours as needed  2/13/2022 at Unknown time     melatonin 5 MG tablet Take 5 mg by mouth nightly as needed for sleep 2/13/2022 at Unknown time     Vitamin D3 (CHOLECALCIFEROL) 25 mcg (1000 units) tablet Take 1 tablet by mouth daily 2/13/2022 at Unknown time       Information source(s): Patient  Method of interview communication: in-person    Summary of Changes to PTA Med List  New: n/a  Discontinued: n/a  Changed: n/a    Patient was asked about OTC/herbal products specifically.  PTA med list reflects this.    In the past week, patient estimated taking medication this percent of the time:  greater than 90%.    Allergies were reviewed, assessed, and updated with the patient.      Patient did not bring any medications to the hospital and can't retrieve from home. No multi-dose medications are available for use during hospital stay.     The information provided in this note is only as accurate as the sources available at the time of the update(s).    Thank you for the opportunity to participate in the care of this patient.    Dominga Robledo RPH  2/14/2022 8:04 PM

## 2022-02-15 NOTE — ED PROVIDER NOTES
Emergency Department Midlevel Supervisory Note     I personally saw the patient and performed a substantive portion of the visit including all aspects of the medical decision making.    ED Course:  7:15 PM Annel Michael PA-C staffed patient with me. I agree with their assessment and plan of management, and I will see the patient.  8:00 PM I met with the patient to introduce myself, gather additional history, perform my initial exam, and discuss the plan.     Brief HPI:     Shirley Phipps is a 86 year old female who presents for evaluation of right flank pain. Flank pain radiates into the right lower abdomen and endorses a fever up to 102 today. Patient has known fractures to her vertebra as well as a UTI but is unsure which is causing her pain. Endorses history of several UTIs and feels as if her symptoms are similar. She has been seen in clinic twice this past week. She had not taken any pain medication but was administered ibuprofen, gabapentin, and IV morphine with some relief to her symptoms. She is fully vaccinated and boosted against COVID-19. No urinary symptoms, chest pain, shortness of breath, leg pain, or any other symptoms at this time.     I, Marlyn Dumont, am serving as a scribe to document services personally performed by Luís White MD, based on my observations and the provider's statements to me.   I, Luís White MD, attest that Marlyn Dumont was acting in a scribe capacity, has observed my performance of the services and has documented them in accordance with my direction.    Brief Physical Exam:  General: Frail elderly female patient, sitting up in bed, no distress  CV: Regular rate and rhythm, extremities well-perfused  Pulmonary: Breathing comfortably on room air  Neurological: Full strength throughout the bilateral upper and lower extremities, fully awake, alert, oriented.    Abdomen: Soft, nontender     MDM:  Patient is an 86-year-old female who presents to the emergency department  for right-sided upper back and flank pain.  She is vitally stable when she arrives here and is afebrile.  Recent visits to the emergency room x2 where she was diagnosed with subacute to acute compression fractures as well as treated for urinary tract infection.  Her urine today shows persistent hematuria and pyuria which might be consistent with a continued infection.  She was appropriately treated with Bactrim given her recent sensitivities on outside urine cultures.  She also has multiple compression fractures which certainly could be causing her pain.  Pain was controlled here and after consultation with pharmacy we are going to place her on Levaquin for continued antimicrobial coverage to make sure she is adequately treated for possible pyelonephritis.  Patient will be admitted given multiple recent ED visits, poor functional status, pain, and to ensure adequate treatment of UTI in consultation for compression fractures.  Agree with remainder of note as documented by ADALBERTO.    1. Pyelonephritis    2. Compression fracture of T3 vertebra, initial encounter (H)    3. Compression fracture of T8 vertebra, initial encounter (H)    4. Compression fracture of T11 vertebra, initial encounter (H)    5. Compression fracture of L1 vertebra, initial encounter (H)    6. Compression fracture of L2 vertebra, initial encounter (H)        Labs and Imaging:  Results for orders placed or performed during the hospital encounter of 02/14/22   Basic metabolic panel   Result Value Ref Range    Sodium 137 136 - 145 mmol/L    Potassium 4.5 3.5 - 5.0 mmol/L    Chloride 108 (H) 98 - 107 mmol/L    Carbon Dioxide (CO2) 26 22 - 31 mmol/L    Anion Gap 3 (L) 5 - 18 mmol/L    Urea Nitrogen 20 8 - 28 mg/dL    Creatinine 0.77 0.60 - 1.10 mg/dL    Calcium 9.4 8.5 - 10.5 mg/dL    Glucose 92 70 - 125 mg/dL    GFR Estimate 75 >60 mL/min/1.73m2   Hepatic function panel   Result Value Ref Range    Bilirubin Total 0.5 0.0 - 1.0 mg/dL    Bilirubin Direct  0.2 <=0.5 mg/dL    Protein Total 6.4 6.0 - 8.0 g/dL    Albumin 3.6 3.5 - 5.0 g/dL    Alkaline Phosphatase 48 45 - 120 U/L    AST 13 0 - 40 U/L    ALT <9 0 - 45 U/L   Result Value Ref Range    Lipase 11 0 - 52 U/L   UA with Microscopic reflex to Culture    Specimen: Urine, Catheter   Result Value Ref Range    Color Urine Yellow Colorless, Straw, Light Yellow, Yellow    Appearance Urine Turbid (A) Clear    Glucose Urine Negative Negative mg/dL    Bilirubin Urine Negative Negative    Ketones Urine Negative Negative mg/dL    Specific Gravity Urine 1.021 1.001 - 1.030    Blood Urine 0.5 mg/dL (A) Negative    pH Urine 5.5 5.0 - 7.0    Protein Albumin Urine 20  (A) Negative mg/dL    Urobilinogen Urine <2.0 <2.0 mg/dL    Nitrite Urine Negative Negative    Leukocyte Esterase Urine 250 Patsy/uL (A) Negative    Bacteria Urine Few (A) None Seen /HPF    WBC Clumps Urine Present (A) None Seen /HPF    Mucus Urine Present (A) None Seen /LPF    RBC Urine 73 (H) <=2 /HPF    WBC Urine 29 (H) <=5 /HPF   CRP inflammation   Result Value Ref Range    CRP 1.2 (H) 0.0-<0.8 mg/dL   Lactic acid whole blood   Result Value Ref Range    Lactic Acid 0.7 0.7 - 2.0 mmol/L   Asymptomatic COVID-19 Virus (Coronavirus) by PCR Nasopharyngeal    Specimen: Nasopharyngeal; Swab   Result Value Ref Range    SARS CoV2 PCR Negative Negative   Troponin I (now)   Result Value Ref Range    Troponin I 0.25 0.00 - 0.29 ng/mL   CBC with platelets and differential   Result Value Ref Range    WBC Count 5.7 4.0 - 11.0 10e3/uL    RBC Count 3.55 (L) 3.80 - 5.20 10e6/uL    Hemoglobin 10.5 (L) 11.7 - 15.7 g/dL    Hematocrit 33.2 (L) 35.0 - 47.0 %    MCV 94 78 - 100 fL    MCH 29.6 26.5 - 33.0 pg    MCHC 31.6 31.5 - 36.5 g/dL    RDW 13.1 10.0 - 15.0 %    Platelet Count 94 (L) 150 - 450 10e3/uL    % Neutrophils 67 %    % Lymphocytes 16 %    % Monocytes 16 %    % Eosinophils 0 %    % Basophils 0 %    % Immature Granulocytes 1 %    NRBCs per 100 WBC 0 <1 /100    Absolute  Neutrophils 3.9 1.6 - 8.3 10e3/uL    Absolute Lymphocytes 0.9 0.8 - 5.3 10e3/uL    Absolute Monocytes 0.9 0.0 - 1.3 10e3/uL    Absolute Eosinophils 0.0 0.0 - 0.7 10e3/uL    Absolute Basophils 0.0 0.0 - 0.2 10e3/uL    Absolute Immature Granulocytes 0.1 <=0.4 10e3/uL    Absolute NRBCs 0.0 10e3/uL   Extra Blue Top Tube   Result Value Ref Range    Hold Specimen J[facility]    Extra Red Top Tube   Result Value Ref Range    Hold Specimen [facility]      I have reviewed the relevant laboratory and radiology studies      Luís White MD  [facility] EMERGENCY DEPARTMENT  [address] [id number] [phone]      Luís White MD  02/14/22 [id number]

## 2022-02-15 NOTE — ED NOTES
Called stores and distribution for pneumatic compression device and small sleeves. PT here and will not do anything until pt gets her TLSO brace

## 2022-02-15 NOTE — ED NOTES
Neuro at bedside and state pt is calling son to bring her custom TLSO brace. Neuro will put in a consult for them to come and adjust it since pt has lost weight. Repositioned pt for comfort

## 2022-02-15 NOTE — H&P
ADMISSION HISTORY & PHYSICAL    CODE STATUS:  Prior    Desirae Thomson, [phone]    Patient YOB: 1935  Admit date: 2/14/2022  Date of Service: 2/14/2022    ASSESSMENT AND PLAN:  86 year old female with past medical history of thoracic and lumbar compression fracture, chronic pain, recurrent UTI, severe aortic stenosis, recent urgent care visit on 2/2/2022 and 2/9/2022 for back pain/flank pain and treated with 2 course of antibiotics who presented to ED for evaluation of ongoing back pain and concern for recurrent UTI and admitted for observation    Concern for recurrent UTI;  --Patient was treated for UTI from urgent care on 2/2/2022 with 3 days course of Bactrim and Cipro and again on 2/9/2022 prescribed 3 more days of Cipro.  --Abnormal UA on admission.  However, no reported dysuria, urgency, normal WBC count, CRP just 1.2, normal lactic acid and hemodynamically stable.  --Recent CT chest abdomen/pelvis from outside hospital dated 2/9/2022 reviewed, reported 3 mm nonobstructing lower pole right kidney stone.  Moderate left kidney atrophy but no reported hydronephrosis or other findings that can suggest pyelonephritis  --Patient received IV Levaquin in ED, continue.  Follow on UC    Acute right flank pain radiating to her anterior abdomen;  --Patient reports pain worse with bending, twisting, when moving around and better with rest.  --As mentioned above, recent CT unremarkable for acute pyelonephritis.  --Likely due to worsening compression fracture, refer below    Multiple compression fracture of thoracic and lumbar vertebrae;  --Patient had recent CT lumbar and thoracic spine on 2/9/2022 at outside hospital, report reviewed, reported worsening compression fracture.  --ED provider discussed with on-call neurosurgery team, reported pain management, activity as tolerated and they will see patient tomorrow  --Continue scheduled Tylenol, home Neurontin and as needed Motrin.  As needed IV/oral  Dilaudid.  Patient reports that she does not tolerate oxycodone.  --Pain team and neurosurgery team consulted     Thrombocytopenia, chronic, seems fairly stable.    DVT prophylaxis; subcu heparin but discontinue if platelet count drops less than 50 K.  Also SCDs and ambulate      Disposition:  -Anticipated Length of Stay in midnights and medical necessity (including a midnight in the Emergency Department after triage if applicable): <2      CHIEF COMPLAINT:  Flank pain    HISTORY OF PRESENTING ILLNESS:  History obtained from patient, ED provider, reviewed previous medical records.    Patient is a 86 year old female with PMH significant for lumbar compression fracture, chronic pain, recurrent UTI, severe aortic stenosis, recent urgent care visit on 2/2/2022 and 2/9/2022 for back pain/flank pain and treated with 2 course of antibiotics who presented to ED for evaluation of right-sided flank pain and concern for recurrent UTI and admitted for observation.    Per chart review, patient was seen at [facility] urgency room first on 2/2/22 where she was found to have a urinary tract infection with hematuria and 3 compression fractures to her lumbar(L1, L2) and thoracic vertebra(T3, T8, T11). She was placed on bactrim and ciprofloxacin for 3 days. Patient was then seen in the urgency room again on 2/9/22, urine culture was collected and told to report to the ED. Patient declined this recommendation and was prescribed 3 more days of ciprofloxacin.     Patient reports right flank pain radiating to her right lower abdomen, also reported had a fever at home today.  Patient denied dysuria, hematuria.  Patient reported that right flank is significantly worse with twisting turning and with ambulation/moving around and better when resting.  Reports when moving around pain goes up to 8/10.  Patient does have significant vertebral compression fracture.  Patient however denied lower extremity numbness or weakness.  Denies short of  breath or chest pain cough or congestion.  Denied nausea, vomiting.  Patient fully vaccinated for COVID-19 along with booster dose.    Patient received multiple doses of IV Dilaudid in the ED    PMH/PSH:  Patient Active Problem List   Diagnosis     Calculus of ureter     E. coli UTI     Hypoxia     Fever, unspecified fever cause     Age-related osteoporosis with current pathological fracture with routine healing, subsequent encounter     Closed wedge compression fracture of L2 vertebra with routine healing, subsequent encounter     Anemia, unspecified type     Compression fracture of T11 vertebra with routine healing, subsequent encounter     Compression fracture of L1 vertebra with routine healing, subsequent encounter     Nephrolithiasis     Hypercalcemia     Tooth decay     Closed compression fracture of L4 lumbar vertebra, sequela     Primary osteoarthritis of right hip     Primary osteoarthritis of right knee     Pyelonephritis     Compression fracture of T11 vertebra, initial encounter (H)     Compression fracture of T3 vertebra, initial encounter (H)     Compression fracture of T8 vertebra, initial encounter (H)     Compression fracture of L1 vertebra, initial encounter (H)     Compression fracture of L2 vertebra, initial encounter (H)       Past Medical History:   Diagnosis Date     Anemia      Aortic stenosis      Arthritis      Bicuspid aortic valve      Bladder infection      Cancer (H)     SQUAMOUS 2X     Closed burst fracture of lumbar vertebra (H) 03/26/2019    L4 BURST FX WITH RETROPULSION     Dyslipidemia      Fall 03/26/2019     GERD (gastroesophageal reflux disease)      History of transfusion     SHE DOESN'T THINK SHE HAD A TRANSFUSION REACTION     Hypertension         Past Surgical History:   Procedure Laterality Date     COMBINED CYSTOSCOPY, INSERT STENT URETER(S) Right 8/4/2021    Procedure: CYSTOURETEROSCOPY, RETROGRADE PYLEOGRAM RIGHT ;  Surgeon: Marbin Liu MD;  Location: [facility]  OR     OTHER SURGICAL HISTORY Right     surgery for prior wrist fracture     OTHER SURGICAL HISTORY Right     surgery for prior knee fracture     PIC  12/25/2016          ZZC RECONSTR TOTAL SHOULDER IMPLANT Left 12/26/2016    Procedure: LEFT REVERSE TOTAL SHOULDER ARTHROPLASTY;  Surgeon: Maurilio Marcelino MD;  Location: [facility] [place];  Service: Orthopedics          ALLERGIES:  Allergies   Allergen Reactions     Allopurinol Other (See Comments)     Cefdinir Hives     Clindamycin Nausea     C.Diff     Codeine Nausea and Vomiting     Tolerates oral morphine.     Flagyl [Metronidazole] Angioedema     Sulfa (Sulfonamide Antibiotics) [Sulfa Drugs] Nausea     Canker sores     Tramadol Unknown     Upset stomach       MEDICATIONS:  Reviewed.  Current Facility-Administered Medications   Medication     oxyCODONE (ROXICODONE) tablet 5 mg     romosozumab-aqqg (EVENITY) injection 210 mg     Current Outpatient Medications   Medication     Calcium Carb-Cholecalciferol (CALCIUM 500 + D) 500-125 MG-UNIT TABS     gabapentin (NEURONTIN) 300 MG capsule     IBUPROFEN PO     melatonin 5 MG tablet     Vitamin D3 (CHOLECALCIFEROL) 25 mcg (1000 units) tablet       Current Facility-Administered Medications:      oxyCODONE (ROXICODONE) tablet 5 mg, 5 mg, Oral, Once, Annel Michael PA-C     romosozumab-aqqg (EVENITY) injection 210 mg, 210 mg, Subcutaneous, Q30 Days, Ashok Reyes MD    Current Outpatient Medications:      Calcium Carb-Cholecalciferol (CALCIUM 500 + D) 500-125 MG-UNIT TABS, Take 1 tablet by mouth 2 times daily , Disp: , Rfl:      gabapentin (NEURONTIN) 300 MG capsule, Take 300 mg by mouth 3 times daily , Disp: , Rfl:      IBUPROFEN PO, Take 400 mg by mouth every 8 hours as needed , Disp: , Rfl:      melatonin 5 MG tablet, Take 5 mg by mouth nightly as needed for sleep, Disp: , Rfl:      Vitamin D3 (CHOLECALCIFEROL) 25 mcg (1000 units) tablet, Take 1 tablet by mouth daily, Disp: , Rfl:    Scheduled Meds:     oxyCODONE  5 mg Oral Once     romosozumab-aqqg  210 mg Subcutaneous Q30 Days     Continuous Infusions:  PRN Meds:.    SOCIAL HISTORY:  Social History     Socioeconomic History     Marital status:      Spouse name: Not on file     Number of children: Not on file     Years of education: Not on file     Highest education level: Not on file   Occupational History     Not on file   Tobacco Use     Smoking status: Never Smoker     Smokeless tobacco: Never Used   Substance and Sexual Activity     Alcohol use: Yes     Alcohol/week: 7.0 standard drinks     Comment: Alcoholic Drinks/day: 1 shot of ebe daily     Drug use: No     Sexual activity: Not on file   Other Topics Concern     Parent/sibling w/ CABG, MI or angioplasty before 65F 55M? Not Asked   Social History Narrative     Not on file     Social Determinants of Health     Financial Resource Strain: Not on file   Food Insecurity: Not on file   Transportation Needs: Not on file   Physical Activity: Not on file   Stress: Not on file   Social Connections: Not on file   Intimate Partner Violence: Not on file   Housing Stability: Not on file       FAMILY HISTORY:  Family History   Problem Relation Age of Onset     Diabetes Type 2  Other      Breast Cancer Mother      Diabetes Father      Diabetes Brother         ROS:  12 point review of systems reviewed and is negative except for what has already been mentioned in HPI.       PHYSICAL EXAM:  GENRL:  Not in acute distress   /56   Pulse 79   Temp 99.1  F (37.3  C) (Oral)   Resp 21   Wt 43.1 kg (95 lb)   SpO2 96%   BMI 17.04 kg/m    I/O last 3 completed shifts:  In: 1100 [IV Piggyback:1100]  Out: -   No intake/output data recorded.  HEENT: NC/AT      Eyes-  Pupil round and reactive to light bilaterally       Neck- supple, no JVP elevation,       Sclera- anicteric      Oropharynx- moist and pink  CHEST: Clear to auscultation bilateral anteriorly, no ronchi or wheezing  HEART: S1S2 normal, regular.   ABDMN:  Soft. Non-tender, non-distended.  No guarding or rigidity. Bowel sounds- active.  Noticed tenderness to palpation on right lower back/flank area and lateral abdominal area but no local swelling or erythema  EXTRM: No pedal edema   INTGM: No skin rash, no cyanosis or clubbing  MUSCULOSKELETAL: no joint tenderness or swelling on upper and lower extremities  NEURO: Alert and awake. Cranial nerves II-XII grossly intact. No focal neurological deficit.  PSYCH: Calm and cooperative      DIAGNOSTIC DATA:    Recent Labs   Lab 02/14/22  1722   WBC 5.7   HGB 10.5*   HCT 33.2*   PLT 94*       Recent Labs   Lab 02/14/22  1722      CO2 26   BUN 20       No results for input(s): INR in the last 168 hours.    Recent Labs   Lab 02/14/22  1722      CO2 26   BUN 20   ALBUMIN 3.6   ALKPHOS 48   ALT <9   AST 13         Patient's new lab studies reviewed personally.  Patient's new radiology reports reviewed personally.  I personally viewed and personally interpreted patient's EKG: Seems sinus rhythm, right bundle branch block, do not see significant change compared to previous EKG.    Note created using dragon voice recognition software.  Errors in spelling or words which seems out of context are unintentional.  Sounds alike errors may have escaped editing.     02/14/2022  CÉSAR DELEON MD  HOSPITALIST, [facility]  PAGER NO. [phone]

## 2022-02-15 NOTE — PROGRESS NOTES
[facility]      OUTPATIENT OCCUPATIONAL THERAPY  EVALUATION  PLAN OF TREATMENT FOR OUTPATIENT REHABILITATION  (COMPLETE FOR INITIAL CLAIMS ONLY)  Patient's Last Name, First Name, M.I.  YOB: 1935  Shirley Phipps                          Provider's Name  [facility] Medical Record No.  [id number]                               Onset Date:  (P) 02/14/22   Start of Care Date:  (P) 02/15/22     Type:     ___PT   _X_OT   ___SLP Medical Diagnosis:                           OT Diagnosis:  (P) weakness, fatigue, decreased ADL skills   Visits from SOC:  1   _________________________________________________________________________________  Plan of Treatment/Functional Goals    Planned Interventions: (P) ADL retraining,strengthening   Goals: See Occupational Therapy Goals on Care Plan in [facility] electronic health record.    Therapy Frequency: (P) Daily  Predicted Duration of Therapy Intervention: (P) 7 days  _________________________________________________________________________________    I CERTIFY THE NEED FOR THESE SERVICES FURNISHED UNDER        THIS PLAN OF TREATMENT AND WHILE UNDER MY CARE     (Physician co-signature of this document indicates review and certification of the therapy plan).                Certification date from: (P) 02/15/22, Certification date to: (P) 02/21/22    Referring Physician: (P) Dr Spicer            Initial Assessment        See Occupational Therapy evaluation dated (P) 02/15/22 in Epic electronic health record.

## 2022-02-15 NOTE — PROGRESS NOTES
"S: Patient is a 87 y/o female, 5'3\", 98 lbs seen today at [facility], room 106, for eval. of presently worn custom TLSO.  O:A: Pt. refused to try on the TLSO at this time. I did look at the TLSO. It is soiled but in good shape. Pt. states that the TLSO fit's well. I did give her our offices phone and locations if Pt. had a need to visit one of the offices.  P: Pt. to be seen as needed.    Hernan Reyez CO,LO  "

## 2022-02-15 NOTE — UTILIZATION REVIEW
Concurrent stay review; Secondary Review Determination       Under the authority of the Utilization Management Committee, the utilization review process indicated a secondary review on the above patient.  The review outcome is based on review of the medical records, discussions with staff, and applying clinical experience noted on the date of the review.          (x) Observation Status Appropriate - Concurrent stay review    RATIONALE FOR DETERMINATION     Ms. Phipps is a 87 yo female with a PMH of vertebral compression fractures and recurrent UTI's who presents to the ED with increased back pain.  Image revealed new thoracic compression fractures and progression of a lumbar fracture.  Neurosurgery consult requested; recommending re-fitting of prior TLSO brace she has at home.  Vitals have been stable.  She has difficulty with narcotics and pain team consulted for recs.  She is tolerating dilaudid ok.  Abnormal UA; received one dose of IV abx until UC returns.  No fevers or hypotension.  Remaining in the hospital for TLSO fitting, therapies and safe discharge planning.      Patient is clinically improving and there is no clear indication to change patient's status to inpatient. The severity of illness, intensity of service provided, expected LOS and risk for adverse outcome make the care appropriate for observation.      The information on this document is developed by the utilization review team in order for the business office to ensure compliance.  This only denotes the appropriateness of proper admission status and does not reflect the quality of care rendered.         The definitions of Inpatient Status and Observation Status used in making the determination above are those provided in the CMS Coverage Manual, Chapter 1 and Chapter 6, section 70.4.          Sincerely,       Karyna Hanley, DO  Utilization Review  Physician Advisor  [facility].

## 2022-02-15 NOTE — CONSULTS
NEUROSURGERY CONSULTATION NOTE  02/15/22    Shirley Samuelslop   [address]  [facility] [place] [address]  86 year old female  Admission Date/Time: 2/14/2022  3:56 PM  [facility] Attending Physician: Genesis Csasidy MD    Neurosurgery was asked to see this patient by Genesis Cassidy MD for evaluation of thoracic and lumbar fractures.     PROBLEM LIST:  Principal Problem:    Recurrent UTI (urinary tract infection)  Active Problems:    Pyelonephritis    Compression fracture of T11 vertebra, initial encounter (H)    Compression fracture of T3 vertebra, initial encounter (H)    Compression fracture of T8 vertebra, initial encounter (H)    Compression fracture of L1 vertebra, initial encounter (H)    Compression fracture of L2 vertebra, initial encounter (H)       Neurosurgery Attending: The patient's clinical examination, laboratory data, and plan was discussed with Dr. De La Rosa    CONSULTATION ASSESSMENT AND PLAN:  Patient well known to our service, previous hx of multiple spinal fractures. New compression fractures in the thoracic spine, T8, T3 (though patient does not have any upper back pain) and worsening L2 in the setting of severe osteoporsis.  Pain started as she grabbed something from her freezer. She already has a TLSO from previous fractures - have asked her to have family bring in and orthotics to evaluate for fit. She is not a surgical candidate (new fractures don't warrant surgical intervention but if they were to worsen), would plan to continue TLSO when out of bed or upright. Would suspect a component of ongoing back pain due to her kyphosis from fractures.  Previous compliance difficulties but she tells me this morning she will wear the brace. In previous visits with her, frequently asks for various pain medications and declines non-narcotic interventions - established with the pain clinic as an outpatient.   Plan:  1. Orthotics eval for fit of  brace  2. Declined consult for vertebroplasty  3. X-rays upright after brace arrives  4. Once brace arrives okay to advance activity  5. Already established with endo for osteoporosis treatment     HPI: 86 year old female well known to our service for previous spine fractures presents again with worsening back pain. She went to the urgency room on 2/9 where she was found to have UTI and new fractures - they recommended she be evaluated in the ED but she declined. Some hx of non-complaince with bracing and she has previously declined treatment for osteoporosis. Now established with endocrinology and is planning on treatrement with injectables. She tells me she was reaching into her bottom drawer freezer when she developed back and right flank pain. She was managing okay at home, using her brace but she developed a UTI and fever which prompted her to get evaluated at the urgency room. She was found to have new thoracic fractures at T3 and T8 as well as worsening L2 fractures. She has a TLSO at home. Other than her back and right flank pain, she tells me no new neuro deficits. Did have a son staying with her who recently returned home to [place], has son that lives in [place] as well. Hx of non-compliance with bracing. 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 She was referred to pain center after her last visit with us for ongoing pain control issues. She understands that her bone quality is too poor and she is too frail for any surgical intervention.   Past Medical History:   Diagnosis Date     Anemia      Aortic stenosis      Arthritis      Bicuspid aortic valve      Bladder infection      Cancer (H)     SQUAMOUS 2X     Closed burst fracture of lumbar vertebra (H) 03/26/2019    L4 BURST FX WITH RETROPULSION     Dyslipidemia      Fall 03/26/2019     GERD (gastroesophageal  reflux disease)      History of transfusion     SHE DOESN'T THINK SHE HAD A TRANSFUSION REACTION     Hypertension      Past Surgical History:   Procedure Laterality Date     COMBINED CYSTOSCOPY, INSERT STENT URETER(S) Right 8/4/2021    Procedure: CYSTOURETEROSCOPY, RETROGRADE PYLEOGRAM RIGHT ;  Surgeon: Marbin Liu MD;  Location: [facility] [place]     OTHER SURGICAL HISTORY Right     surgery for prior wrist fracture     OTHER SURGICAL HISTORY Right     surgery for prior knee fracture     PIC  12/25/2016          ZZC RECONSTR TOTAL SHOULDER IMPLANT Left 12/26/2016    Procedure: LEFT REVERSE TOTAL SHOULDER ARTHROPLASTY;  Surgeon: Maurilio Marcelino MD;  [place] [facility] [place]  Service: Orthopedics       REVIEW OF SYSTEMS:  12 point review of systems is negative apart from HPI    MEDICATIONS:  Current Outpatient Medications   Medication Sig Dispense Refill     Calcium Carb-Cholecalciferol (CALCIUM 500 + D) 500-125 MG-UNIT TABS Take 1 tablet by mouth 2 times daily        gabapentin (NEURONTIN) 300 MG capsule Take 300 mg by mouth 3 times daily        IBUPROFEN PO Take 400 mg by mouth every 8 hours as needed        melatonin 5 MG tablet Take 5 mg by mouth nightly as needed for sleep       Vitamin D3 (CHOLECALCIFEROL) 25 mcg (1000 units) tablet Take 1 tablet by mouth daily           ALLERGIES/SENSITIVITIES:     Allergies   Allergen Reactions     Allopurinol Other (See Comments)     Cefdinir Hives     Clindamycin Nausea     C.Diff     Codeine Nausea and Vomiting     Tolerates oral morphine.     Flagyl [Metronidazole] Angioedema     Sulfa (Sulfonamide Antibiotics) [Sulfa Drugs] Nausea     Canker sores     Tramadol Unknown     Upset stomach       PERTINENT SOCIAL HISTORY: reviewed, sons that are supportive. Still lives independently.   Social History     Socioeconomic History     Marital status:      Spouse name: Not on file     Number of children: Not on file     Years of education: Not on file     Highest  education level: Not on file   Occupational History     Not on file   Tobacco Use     Smoking status: Never Smoker     Smokeless tobacco: Never Used   Substance and Sexual Activity     Alcohol use: Yes     Alcohol/week: 7.0 standard drinks     Comment: Alcoholic Drinks/day: 1 shot of bee daily     Drug use: No     Sexual activity: Not on file   Other Topics Concern     Parent/sibling w/ CABG, MI or angioplasty before 65F 55M? Not Asked   Social History Narrative     Not on file     Social Determinants of Health     Financial Resource Strain: Not on file   Food Insecurity: Not on file   Transportation Needs: Not on file   Physical Activity: Not on file   Stress: Not on file   Social Connections: Not on file   Intimate Partner Violence: Not on file   Housing Stability: Not on file         FAMILY HISTORY:  Family History   Problem Relation Age of Onset     Diabetes Type 2  Other      Breast Cancer Mother      Diabetes Father      Diabetes Brother         PHYSICAL EXAM:   Constitutional: /57   Pulse 74   Temp 98  F (36.7  C) (Oral)   Resp 14   Wt 43.1 kg (95 lb)   SpO2 90%   BMI 17.04 kg/m       Mental Status: A & O in no acute distress.  Affect is appropriate.  Speech is fluent.  Recent and remote memory are intact.  Attention span and concentration are normal.     Cranial Nerves: CN1: grossly intact per patient recall. CN2: No funduscopic exam performed. CN3,4 & 6: Pupillary light response, lateral and vertical gaze normal.  No nystagmus.  Visual fields are full to confrontation. CN5: Intact to touch CN7: No facial weakness, smile, facial symmetry intact. CN8: Intact to spoken voice. CN9&10: Gag reflex, uvula midline, palate rises with phonation. CN11: Shoulder shrug 5/5 intact bilaterally. CN12: Tongue midline and moves freely from side to side.     Motor: No pronator drift of upper extremity. Normal bulk and tone all muscle groups of upper and lower extremities.    Strength: 5/5 in lower extremities  apart from right hip flexor weakness - not new.   Sensory: Sensation intact bilaterally to light touch.     Coordination; gait testing deferred.       IMAGING:  I personally reviewed all radiographic images   CT thoracic/lumbar  2/9/2022  New T8 fracture without significant retropulsion - fracture lines present  T3 with new height loss, no retropulsion.  Near vertebraplana at L2 which has worsened since previous imaging  Please see care everywhere for full report and details.       (non critical care) I spent more than 90 minutes in this apt, examining the pt, reviewing the scans, reviewing notes from chart, discussing treatment options with risks and benefits and coordinating care. >50 %time was spent in face to face counseling and coordinating care    Clara Mitchell NP

## 2022-02-15 NOTE — PROGRESS NOTES
[facility]    Medicine Progress Note - Hospitalist Service    Date of Admission:  2/14/2022    Assessment & Plan        86 year old female with past medical history of thoracic and lumbar compression fracture, chronic pain, recurrent UTI, severe aortic stenosis, recent urgent care visit on 2/2/2022 and 2/9/2022 for back pain/flank pain and treated with 2 course of antibiotics who presented to ED for evaluation of ongoing back pain and concern for recurrent UTI and admitted for observation     Concern for recurrent UTI;  --Patient was treated for UTI from urgent care on 2/2/2022 with 3 days course of Bactrim and Cipro and again on 2/9/2022 prescribed 3 more days of Cipro.  --Abnormal UA on admission.  However, no reported dysuria, urgency, normal WBC count, CRP just 1.2, normal lactic acid and hemodynamically stable.  --Recent CT chest abdomen/pelvis from outside hospital dated 2/9/2022 reviewed, reported 3 mm nonobstructing lower pole right kidney stone.  Moderate left kidney atrophy but no reported hydronephrosis or other findings that can suggest pyelonephritis  --Patient received IV Levaquin in ED, continue.  Follow on UC     Acute right flank pain radiating to her anterior abdomen;  --Patient reports pain worse with bending, twisting, when moving around and better with rest.  --As mentioned above, recent CT unremarkable for acute pyelonephritis.  --Likely due to worsening compression fracture, refer below     Multiple compression fracture of thoracic and lumbar vertebrae;  --Patient had recent CT lumbar and thoracic spine on 2/9/2022 at outside hospital, report reviewed, reported worsening compression fracture.  --ED provider discussed with on-call neurosurgery team, reported pain management, activity as tolerated and they will see patient tomorrow  --Continue scheduled Tylenol, home Neurontin and as needed Motrin.  As needed IV/oral Dilaudid.  Patient reports that she does not tolerate  oxycodone.  --Pain team and neurosurgery team consulted  --try to keep with oral meds, minimize narcotics as able--discussed with Neurosurgery and Pain team      Thrombocytopenia, chronic, seems fairly stable.     Diet: Combination Diet Regular Diet Adult    DVT Prophylaxis: Heparin SQ  Andres Catheter: Not present  Central Lines: None  Cardiac Monitoring: None  Code Status:      Disposition Plan   Expected Discharge: 02/16/2022   Anticipated discharge location:  Awaiting care coordination huddle  Delays:      Pain control, Therapy recs for safe disposition       The patient's care was discussed with the Patient, Neurosurgery Clara Mitchell CNP Consultant and Adilene Medina CNP Pain Team.    Genesis Cassidy MD  Hospitalist Service  [facility]  Securely message with the Vocera Web Console (learn more here)  Text page via [employer] Paging/Directory         Clinically Significant Risk Factors Present on Admission               # Thrombocytopenia: Plts = 94 10e3/uL (Ref range: 150 - 450 10e3/uL) on admission, will monitor for bleeding       ______________________________________________________________________    Interval History   Reports pain still, mostly with movement. Does have home TLSO here now.  Aware of IV abx for UTI while awaiting culture.  Hoping ability to go home soon.    Data reviewed today: I reviewed all medications, new labs and imaging results over the last 24 hours. I personally reviewed no images or EKG's today.    Physical Exam   Vital Signs: Temp: 98  F (36.7  C) Temp src: Oral BP: 109/53 Pulse: 73   Resp: 14 SpO2: 92 % O2 Device: None (Room air) Oxygen Delivery: 1.5 LPM  Weight: 95 lbs 0 oz  General: Alert, cooperative frail elderly female, No apparent distress  Head: Normocephalic, atraumatic  Eyes: Pupils equal, round and reactive, Extra-ocular movements intact  Mouth: Mucus membranes moist  Neck: Supple  Cardiovascular: Regular rate and rhythm, Normal S1, S2  Lungs:  Clear to auscultation bilaterally  Abdomen: Soft, Non-tender, not distended, Bowel sounds present  Extremities: No edema, no clubbing  Skin: Warm and well-perfused without lesions.  Neurologic: Alert and oriented. Face is symmetric, Moves all extremities equally      Data   Recent Labs   Lab 02/14/22  1722   WBC 5.7   HGB 10.5*   MCV 94   PLT 94*      POTASSIUM 4.5   CHLORIDE 108*   CO2 26   BUN 20   CR 0.77   ANIONGAP 3*   LEIF 9.4   GLC 92   ALBUMIN 3.6   PROTTOTAL 6.4   BILITOTAL 0.5   ALKPHOS 48   ALT <9   AST 13   LIPASE 11

## 2022-02-16 NOTE — PLAN OF CARE
Problem: Adult Inpatient Plan of Care  Goal: Plan of Care Review  Outcome: Ongoing, Progressing   Goal Outcome Evaluation:   Continues to co back and knee pain medicated with Po Dilaudid   Every four hours always rates pain 7/10, appears comfortable   Up with TLSO on with assist of one. Back xray done.  Denies nausea cooperative.                    Statement Selected

## 2022-02-16 NOTE — PLAN OF CARE
PRIMARY DIAGNOSIS: ACUTE PAIN  OUTPATIENT/OBSERVATION GOALS TO BE MET BEFORE DISCHARGE:  1. Pain Status: Improved-controlled with oral pain medications.    2. Return to near baseline physical activity: No    3. Cleared for discharge by consultants (if involved): No    Discharge Planner Nurse   Safe discharge environment identified: Yes  Barriers to discharge: Yes       Entered by: Sana Mesa 02/16/2022 5:04 AM     Please review provider order for any additional goals.   Nurse to notify provider when observation goals have been met and patient is ready for discharge.

## 2022-02-16 NOTE — PLAN OF CARE
"PRIMARY DIAGNOSIS: \"GENERIC\" NURSING  OUTPATIENT/OBSERVATION GOALS TO BE MET BEFORE DISCHARGE:  ADLs back to baseline: No    Activity and level of assistance: bedrest      Pain status: Improved but still requiring IV narcotics.    Return to near baseline physical activity: No     Discharge Planner Nurse   Safe discharge environment identified: Yes  Barriers to discharge: Yes       Entered by: Debora Chow 02/16/2022 1:48 AM     Please review provider order for any additional goals.   Nurse to notify provider when observation goals have been met and patient is ready for discharge.    Admission completed. Iv pain meds helping. Pt declined to do xray or ct tonight, said will do tomorrow. A/o x4, pleasant. Ate 25% of supper. Watching tv in bed. External catheter patent. Wants to have iv abx as soon as possible. Expresses concern about infection worsening due to sepsis last time she had a uti. Bed alarm on. Reminded pt to use call light for needs. Continue to monitor pt.   "

## 2022-02-16 NOTE — PROGRESS NOTES
02/16/22 1000   Quick Adds   Quick Adds Certification   Type of Visit Initial PT Evaluation   Living Environment   People in Home alone  (sons visit)   Current Living Arrangements house   Home Accessibility no concerns   Transportation Anticipated family or friend will provide   Living Environment Comments Pt lives in a house alone. Son drives her places. Gets groceries delivered.   Self-Care   Usual Activity Tolerance good   Current Activity Tolerance fair   Regular Exercise Yes   Activity/Exercise Type walking   Exercise Amount/Frequency daily   Equipment Currently Used at Home walker, rolling;shower chair   Fall history within last six months no   Activity/Exercise/Self-Care Comment Pt is a retired .   General Information   Onset of Illness/Injury or Date of Surgery 02/14/22   Referring Physician Dr. Genesis Cassidy   Patient/Family Therapy Goals Statement (PT) To go home.   Pertinent History of Current Problem (include personal factors and/or comorbidities that impact the POC) From chart: 86 year old female with past medical history of thoracic and lumbar compression fracture, chronic pain, recurrent UTI, severe aortic stenosis, recent urgent care visit on 2/2/2022 and 2/9/2022 for back pain/flank pain and treated with 2 course of antibiotics who presented to ED for evaluation of ongoing back pain and concern for recurrent UTI and admitted for observation.   Existing Precautions/Restrictions brace worn when out of bed;spinal   Weight-Bearing Status - LLE weight-bearing as tolerated   Weight-Bearing Status - RLE weight-bearing as tolerated   Heart Disease Risk Factors Lack of physical activity;Age   General Observations Female supine in bed. Talkative but redirectable, possibly slightly disoriented.   Cognition   Affect/Mental Status (Cognition) WFL  (possibly slightly confused)   Orientation Status (Cognition) oriented x 3   Safety Deficit (Cognition) minimal deficit   Memory Deficit  (Cognition) minimal deficit   Cognitive Status Comments Pleasant, cooperative. Suspect may have some memory/safety deficits.   Pain Assessment   Patient Currently in Pain Yes, see Vital Sign flowsheet  (Back pain)   Integumentary/Edema   Integumentary/Edema Comments Not formally assessed.   Posture    Posture Forward head position;Protracted shoulders;Kyphosis   Range of Motion (ROM)   ROM Comment Grossly WFL.   Strength (Manual Muscle Testing)   Strength Comments Grossly WFL, possibly slightly impaired   Bed Mobility   Comment, (Bed Mobility) Supine-sit with log roll, Sarah. Sit-supine with SBA. ModA for donning brace.   Transfers   Comment, (Transfers) Sit-stand with walker, CGA.   Gait/Stairs (Locomotion)   Comment, (Gait/Stairs) Ambulated x 60 ft with walker, CGA. Generally steady.   Balance   Balance Comments Adequate for ambulation with AD.   Sensory Examination   Sensory Perception patient reports no sensory changes   Coordination   Coordination no deficits were identified   Muscle Tone   Muscle Tone no deficits were identified   Clinical Impression   Criteria for Skilled Therapeutic Intervention Yes, treatment indicated   PT Diagnosis (PT) Impaired functional mobility   Influenced by the following impairments Medical, weakness?   Functional limitations due to impairments Functional mobility, falls risk   Clinical Presentation (PT Evaluation Complexity) Stable/Uncomplicated   Clinical Presentation Rationale Clinician judgment.   Clinical Decision Making (Complexity) low complexity   Planned Therapy Interventions (PT) bed mobility training;gait training;home exercise program;neuromuscular re-education;patient/family education;strengthening   Risk & Benefits of therapy have been explained patient   PT Discharge Planning   PT Discharge Recommendation (DC Rec) Transitional Care Facility;home with assist;home with home care physical therapy   PT Rationale for DC Rec Pt is adamant about going home and NOT receiving  any services. However, the safest option for her would be TCU given the several compression fractures and non-adherence to wearing the TLSO. Suspect may have some cognitive deficits that impair her safety awareness as well. Will need 24 hour assist available at home if goes home.   PT Brief overview of current status PT eval completed. Rosetta for bed mobility, modA for donning brace, ambulated ~60 ft with walker, CGA. Some impaired safety awareness noted.  After this evaluation, Neurosurgery saw the patient and ordered bedrest until standing X-rays completed. There had been no such order when I saw the patient and the patient had been cleared for activity.   Therapy Certification   Start of care date 02/16/22   Certification date from 02/16/22   Certification date to 02/23/22   Medical Diagnosis Recurrent UTI   Total Evaluation Time   Total Evaluation Time (Minutes) 10   Physical Therapy Goals   PT Frequency Daily   PT Predicated Duration/Target Date for Goal Attainment 02/23/22   PT: Bed Mobility Modified independent;Supine to/from sit;Within precautions   PT: Transfers Modified independent;Sit to/from stand;Within precautions   PT: Gait Modified independent;Rolling walker;150 feet   PT: Goal 1 Pt able to don/doff TLSO IND

## 2022-02-16 NOTE — PROGRESS NOTES
Neurosurgery Progress Note:  2/16/2022     A/P: Ms. Phipps is a 86 year old right handed female known to our practice for previus history of multiple spinal fractures found to have new compression fractures in the thoracic spine, T8, T3 (though patient does not have any upper back pain) and worsening L2 in the setting of severe osteoporsis. also noted to have UTI of which she is presently being treated for.     Today she states that she is doing well has complaint of slight back pain and notes some right flank pain that is worsened with movement. She denies radicular lower extremity pain, numbness, tingling, or weakness       Addendum:  Xray reviewed with worsening height loss of T8 fracture with stable appearance of all other fractures  Per nursing patient is doing well ambulates with assist and has worn brace today   Will need to re-iterate importance of brace and that it should be worn when sitting greater than 30 degrees and when out of bed. Some hx of non-complaince with bracing in the past        Plan:  1. Brace to be worn when out of bed   2. Keep on bedrest until standing /sitting thoracic xray   3. Once images reviewed will advance activity as tolerated with PT/OT   Possible to sign off later today with tentative follow up in 2 weeks with repeat xray        Neurosurgery Attending: The patient's clinical examination, laboratory data, and plan was discussed with Dr. De La Rosa      HPI: 86 year old female well known to our service for previous spine fractures presents again with worsening back pain. She went to the urgency room on 2/9 where she was found to have UTI and new fractures - they recommended she be evaluated in the ED but she declined. Some hx of non-complaince with bracing and she has previously declined treatment for osteoporosis. Now established with endocrinology and is planning on treatrement with injectables. She tells me she was reaching into her bottom drawer freezer when she developed back  "and right flank pain. She was managing okay at home, using her brace but she developed a UTI and fever which prompted her to get evaluated at the urgency room. She was found to have new thoracic fractures at T3 and T8 as well as worsening L2 fractures. She has a TLSO at home. Other than her back and right flank pain, she tells me no new neuro deficits. Did have a son staying with her who recently returned home to [place], has son that lives in [place] as well. Hx of non-compliance with bracing. 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 She was referred to pain center after her last visit with us for ongoing pain control issues. She understands that her bone quality is too poor and she is too frail for any surgical intervention.      S:   Today she states that she is doing well has complaint of slight back pain and notes some right flank pain that is worsened with movement. She denies radicular lower extremity pain, numbness, tingling, or weakness        O:  /55 (BP Location: Right arm)   Pulse 79   Temp 98.5  F (36.9  C) (Oral)   Resp 18   Ht 1.575 m (5' 2\")   Wt 46.3 kg (102 lb)   SpO2 92%   BMI 18.66 kg/m      Mental Status: A & O in no acute distress.  Affect is appropriate.  Speech is fluent.  Recent and remote memory are intact.  Attention span and concentration are normal.     Cranial Nerves: CN1: grossly intact per patient recall. CN2: No funduscopic exam performed. CN3,4 & 6: Pupillary light response, lateral and vertical gaze normal.  No nystagmus.  Visual fields are full to confrontation. CN5: Intact to touch CN7: No facial weakness, smile, facial symmetry intact. CN8: Intact to spoken voice. CN9&10: Gag reflex, uvula midline, palate rises with phonation. CN11: Shoulder shrug 5/5 intact bilaterally. CN12: Tongue midline and moves freely from side to " side.     Motor: No pronator drift of upper extremity. Normal bulk and tone all muscle groups of upper and lower extremities.     Strength: 5/5 in lower extremities apart from right hip flexor weakness - not new.   Sensory: Sensation intact bilaterally to light touch.     Coordination; gait testing deferred.          Mindi Looney PA-C  [facility] Neurosurgery  O: [phone]

## 2022-02-16 NOTE — PROGRESS NOTES
[facility] ACUTE PAIN SERVICE    ([facility], [facility], [facility])   Daily PAIN Progress Note    Assessment/Plan:  Shirley Phipps is a 86 year old female who was admitted on 2/14/2022.    Pain Service is asked to see the patient for acute upon chronic back pain.  Admitted for evaluation of right flank pain with onset of fever.  Patient had recently been diagnosed with UTI and compression fracture one week ago. History of osteoporosis, osteoarthritis, nephrolithiasis, acute kidney injury, UTI, aortic stenosis, and compression fractures of thoracic and lumbar spine.      Patient stated that she came into the hospital because she was concerned about UTI going into her kidneys after she developed fevers.    Neurosurgery Consult not a surgical candidate, recommend refitting of TLSO brace, continue PT/OT, conservative management.  She states that she uses the TLSO brace at home and does find it useful.  From chart review there has been concern that patient is not compliant with using the brace as prescribed.      Over past 24 hours received 2 (0.2mg) IV hydromorphone and 3(2mg) oral hydromorphone about 32 MME  Patient identifying pain is under good control, improved since yesterday.  She is getting benefit from the topical creams.    She is adamant that she won't go to a TCU.  Not open to discussion about this option with me.       PLAN:   1) Pain is consistent with acute upon chronic pain with history of osteoarthritis, hips and knee as well as severe osteoporosis with compression fractures.   The patient's home MME was 0 mg daily.   2)Multimodal Medication Therapy  Topical: Lidocaine patch every hs, Voltaren cream to back, hips and knee qid   NSAID'S: ibuprofen 400 mg every 8 hours prn   Muscle Relaxants: consider  Adjuvants: acetaminophen 975 mg tid, gabapentin 300 mg tid  Antidepressants/anxiolytics:  Opioids: hydromorphone 2 mg every 6 hours prn    IV Pain medication:  now  "stopped  3)Non-medication interventions ice, heat  Acupuncture consult- as available Mon and Thursday  Integrative consult - please offer  4)Constipation Prophylaxis  Daily stool softener/laxative   5) Follow up   -Opioid prescriber has been none  -Discharge Recommendations - We recommend prescribing the following at the time of discharge:  hydromorphone 2 mg tid prn for 5 days if able to receive it in a supervised environment.    optimize non opioid therapies          Principal Problem:    Recurrent UTI (urinary tract infection)  Active Problems:    Pyelonephritis    Compression fracture of T11 vertebra, initial encounter (H)    Compression fracture of T3 vertebra, initial encounter (H)    Compression fracture of T8 vertebra, initial encounter (H)    Compression fracture of L1 vertebra, initial encounter (H)    Compression fracture of L2 vertebra, initial encounter (H)     LOS: 0 days       Subjective:  Patient reports pain is improved, was able to walk with PT.  States wearing the brace helps.  Rates pain at a \"7\" Moderate constipation declining stool medications.      calcium carbonate-vitamin D  1 tablet Oral BID     diclofenac  2 g Topical 4x Daily     gabapentin  300 mg Oral TID     heparin ANTICOAGULANT  5,000 Units Subcutaneous Q12H     levofloxacin  250 mg Intravenous Q24H     lidocaine  1-3 patch Transdermal Q24h    And     lidocaine   Transdermal Q8H     polyethylene glycol  17 g Oral Daily     sodium chloride (PF)  3 mL Intracatheter Q8H     Vitamin D3  25 mcg Oral Daily       Objective:  Vital signs in last 24 hours:  Temp:  [98.5  F (36.9  C)-99.1  F (37.3  C)] 98.5  F (36.9  C)  Pulse:  [71-83] 79  Resp:  [16-18] 18  BP: (104-118)/(53-59) 104/55  SpO2:  [90 %-98 %] 92 %  Weight:   Weight change: 3.175 kg (7 lb)  Body mass index is 18.66 kg/m .    Intake/Output last 3 shifts:  I/O last 3 completed shifts:  In: 240 [P.O.:240]  Out: 500 [Urine:500]  Intake/Output this shift:  No intake/output data " recorded.    Review of Systems:   As per subjective, all others negative.    Physical Exam:    General Appearance:  Alert, cooperative, no distress, appears stated age   Head:  Normocephalic, without obvious abnormality, atraumatic   Eyes:  PERRL, conjunctiva/corneas clear, EOM's intact   Nose: Nares normal, septum midline, mucosa normal, no drainage   Throat: Lips, mucosa, and tongue normal; teeth and gums normal   Neck: Supple, symmetrical, trachea midline   Back:   Not wearing brace   Lungs:   Respirations unlabored   Abdomen:   Soft, non-tender, non distended   Extremities: Extremities normal, atraumatic, no cyanosis or edema   Skin: Skin color, texture, turgor normal, no rashes or lesions   Neurologic: Alert and oriented X 3, Moves all 4 extremities          Imaging:  Personally Reviewed.  CT External Imaging Spine    Result Date: 2/15/2022  Images were obtained from an external facility.  Click PACS Images hyperlink to view images.  Textual results have been scanned into the media tab.    CT External Imaging Spine    Result Date: 2/15/2022  Images were obtained from an external facility.  Click PACS Images hyperlink to view images.  Textual results have been scanned into the media tab.      Lab Results:  Personally Reviewed.   Recent Labs   Lab 02/14/22  1722   WBC 5.7   HGB 10.5*   HCT 33.2*   PLT 94*     Recent Labs   Lab 02/14/22  1722      CO2 26   BUN 20   ALBUMIN 3.6   ALKPHOS 48   ALT <9   AST 13     No results for input(s): INR in the last 168 hours.      Total time spent 25 minutes with greater than 50% in consultation, education and coordination of care.     Also discussed with RN      Adilene Medina APRN, CNS-BC, DNP  Acute Care Pain Management Program  [facility] ([place], [place], [place])   With questions call [phone]  Preference if for [employer] Paging - Charles  Click HERE to page Marta

## 2022-02-16 NOTE — PLAN OF CARE
PRIMARY DIAGNOSIS: ACUTE PAIN  OUTPATIENT/OBSERVATION GOALS TO BE MET BEFORE DISCHARGE:  1. Pain Status: Improved-controlled with oral pain medications.    2. Return to near baseline physical activity: No    3. Cleared for discharge by consultants (if involved): No    Discharge Planner Nurse   Safe discharge environment identified: Yes  Barriers to discharge: Yes       Entered by: Sana Mesa 02/16/2022 6:54 AM         Please review provider order for any additional goals.   Nurse to notify provider when observation goals have been met and patient is ready for discharge.Goal Outcome Evaluation:

## 2022-02-16 NOTE — PLAN OF CARE
[facility]      OUTPATIENT PHYSICAL THERAPY EVALUATION  PLAN OF TREATMENT FOR OUTPATIENT REHABILITATION  (COMPLETE FOR INITIAL CLAIMS ONLY)  Patient's Last Name, First Name, M.I.  YOB: 1935  Shirley Phipps                        Provider's Name  [facility] Medical Record No.  [id number]                               Onset Date:  02/14/22   Start of Care Date:  02/16/22      Type:     _X_PT   ___OT   ___SLP Medical Diagnosis:  Recurrent UTI                        PT Diagnosis:  Impaired functional mobility   Visits from SOC:  1   _________________________________________________________________________________  Plan of Treatment/Functional Goals    Planned Interventions: bed mobility training, gait training, home exercise program, neuromuscular re-education, patient/family education, strengthening     Goals: See Physical Therapy Goals on Care Plan in [employer] electronic health record.    Therapy Frequency: Daily  Predicted Duration of Therapy Intervention: 02/23/22  _________________________________________________________________________________    I CERTIFY THE NEED FOR THESE SERVICES FURNISHED UNDER        THIS PLAN OF TREATMENT AND WHILE UNDER MY CARE     (Physician co-signature of this document indicates review and certification of the therapy plan).                Certification date from: 02/16/22, Certification date to: 02/23/22    Referring Physician: Dr. Genesis Cassidy            Initial Assessment        See Physical Therapy evaluation dated 02/16/22 in Epic electronic health record.

## 2022-02-16 NOTE — PROGRESS NOTES
[facility]    Medicine Progress Note - Hospitalist Service    Date of Admission:  2/14/2022    Assessment & Plan                   86 year old female with past medical history of thoracic and lumbar compression fracture, chronic pain, recurrent UTI, severe aortic stenosis, recent urgent care visit on 2/2/2022 and 2/9/2022 for back pain/flank pain and treated with 2 course of antibiotics who presented to ED for evaluation of ongoing back pain and concern for recurrent UTI and admitted for observation     Concern for recurrent UTI;  --Patient was treated for UTI from urgent care on 2/2/2022 with 3 days course of Bactrim and Cipro and again on 2/9/2022 prescribed 3 more days of Cipro.  --Abnormal UA on admission.  However, no reported dysuria, urgency, normal WBC count, CRP just 1.2, normal lactic acid and hemodynamically stable.  --Recent CT chest abdomen/pelvis from outside hospital dated 2/9/2022 reviewed, reported 3 mm nonobstructing lower pole right kidney stone.  Moderate left kidney atrophy but no reported hydronephrosis or other findings that can suggest pyelonephritis  --Patient received IV Levaquin in ED, continue.  Follow on UC negative to date  --change to oral Levaquin     Acute right flank pain radiating to her anterior abdomen;  --Patient reports pain worse with bending, twisting, when moving around and better with rest.  --As mentioned above, recent CT unremarkable for acute pyelonephritis.  --Likely due to worsening compression fracture, refer below     Multiple compression fracture of thoracic and lumbar vertebrae;  --Patient had recent CT lumbar and thoracic spine on 2/9/2022 at outside hospital, report reviewed, reported worsening compression fracture.  --ED provider discussed with on-call neurosurgery team, reported pain management, activity as tolerated and they will see patient tomorrow  --Continue scheduled Tylenol, home Neurontin and as needed Motrin.  As needed IV/oral  Dilaudid.  Patient reports that she does not tolerate oxycodone.  --Pain team and neurosurgery team consulted  --try to keep with oral meds, minimize narcotics as able--discussed with Neurosurgery and Pain team  --OT recommending TCU, PT pending.  --refusing TCU or home care and refusing to let me speak with family  --questioning cognitive state---will do OT testing, may need to override and speak with son      Thrombocytopenia, chronic, seems fairly stable.     Diet: Combination Diet Regular Diet Adult    DVT Prophylaxis: Heparin SQ  Andres Catheter: Not present  Central Lines: None  Cardiac Monitoring: None  Code Status:   Full code for now---need to discuss further with patient however also questioning her cognitive status    Disposition Plan   Expected Discharge: 02/17/2022     Anticipated discharge location:  Awaiting care coordination huddle Refusing TCU and home care  Delays:     OT Disposition recs needed  PT Disposition recs needed  Other (Add Comment)  IV Medication - consider oral or Home Infusion  Imaging Result Pending (enter specific test & time in comments)            The patient's care was discussed with the Bedside Nurse, Patient and patient refuses to let me contact family---suspect some cognitive decline--will perform testing.    Genesis Cassidy MD  Hospitalist Service  [facility]  Securely message with the [employer] Web Console (learn more here)  Text page via [employer] Paging/Directory         Clinically Significant Risk Factors Present on Admission                     ______________________________________________________________________    Interval History   Reports pain in right rib area.  Fixated and asking several repetitive questions about UTI.  Reviewed need for brace as has not been wearing.  Reviewed changing IV abx to oral.  Asking for increase in pain meds.  Did continue to take IV yesterday until late.  Needs assist of 2.  Refusing TCU and refusing home care.   Declining allowing to have me contact family.    Data reviewed today: I reviewed all medications, new labs and imaging results over the last 24 hours. I personally reviewed no images or EKG's today.    Physical Exam   Vital Signs: Temp: 98.5  F (36.9  C) Temp src: Oral BP: 104/55 Pulse: 79   Resp: 18 SpO2: 92 % O2 Device: None (Room air)    Weight: 102 lbs 0 oz  General: Alert, cooperative frail elderly female, No apparent distress, calm and resting as I enter then begins to hold right side and frequently winces or moans with pain  Head: Normocephalic, atraumatic  Eyes: Pupils equal, round and reactive, Extra-ocular movements intact  Mouth: Mucus membranes moist  Neck: Supple  Cardiovascular: Regular rate and rhythm, Normal S1, S2  Lungs: Clear to auscultation bilaterally  Abdomen: Soft, Non-tender, not distended, Bowel sounds present  Extremities: No edema, no clubbing  Skin: Warm and well-perfused without lesions.  Neurologic: Alert and oriented. Face is symmetric, Moves all extremities equally       Data   Recent Labs   Lab 02/14/22  1722   WBC 5.7   HGB 10.5*   MCV 94   PLT 94*      POTASSIUM 4.5   CHLORIDE 108*   CO2 26   BUN 20   CR 0.77   ANIONGAP 3*   LEIF 9.4   GLC 92   ALBUMIN 3.6   PROTTOTAL 6.4   BILITOTAL 0.5   ALKPHOS 48   ALT <9   AST 13   LIPASE 11     No results found for this or any previous visit (from the past 24 hour(s)).

## 2022-02-16 NOTE — PLAN OF CARE
"PRIMARY DIAGNOSIS: \"GENERIC\" NURSING  OUTPATIENT/OBSERVATION GOALS TO BE MET BEFORE DISCHARGE:  ADLs back to baseline: No    Activity and level of assistance: remains on bedrest      Pain status: Improved but still requiring IV narcotics.    Return to near baseline physical activity: No     Discharge Planner Nurse   Safe discharge environment identified: Yes  Barriers to discharge: Yes       Entered by: Debora Chow 02/16/2022 1:49 AM     Please review provider order for any additional goals.   Nurse to notify provider when observation goals have been met and patient is ready for discharge.    Pain controlled w/ iv pain med & topical cream. Pt said it also helps to be able to rest and adjust head of bed. Pt continues to decline to do xray or ct tonight, said will do tomorrow. A/o x4, pleasant.  Watching tv in bed. External catheter patent. Iv abx given as ordered. Bed alarm on. Reminded pt to use call light for needs. Continue to monitor pt.   "

## 2022-02-17 NOTE — PROGRESS NOTES
PRIMARY DIAGNOSIS: weakness NURSING  OUTPATIENT/OBSERVATION GOALS TO BE MET BEFORE DISCHARGE:  1. ADLs back to baseline: Yes    2. Activity and level of assistance: Up with standby assistance.    3. Pain status: Improved-controlled with oral pain medications.    4. Return to near baseline physical activity: Yes     Discharge Planner Nurse   Safe discharge environment identified: Yes  Barriers to discharge: No       Entered by: Tristin Dillard 02/16/2022 10:30 PM     Please review provider order for any additional goals.   Nurse to notify provider when observation goals have been met and patient is ready for discharge.

## 2022-02-17 NOTE — TELEPHONE ENCOUNTER
PATIENT NAME:  Shirley Phipps  YOB: 1935  MRN: [id number]  SURGEON: Dr. De La Rosa  DATE of CONSULT: 02/16/2022  Consult for T8 fracture    FOLLOW-UP PLAN:    Hospital Follow Up Visit: 4 weeks  Provider: ADALBERTO    DIAGNOSTICS: XR  DISPOSITION: pending    ADDITIONAL INSTRUCTIONS FOR MEDICAL STAFF:      Kady Ly RN, CNRN

## 2022-02-17 NOTE — CONSULTS
"ACUPUNCTURIST TREATMENT NOTE    Name: Shirley Phipps  :  1935  MRN:  [id number]    Acupuncture Treatment  Patient Type: Medical  Intervention Reason: Pain, Desires Experience  Pain Location: right flank and right knee  Pre-session Pain Ratin  Post-session Pain Ratin  Patient complaint:: Patient complains of pain in right flank area and right knee  Initial insertions: Baihui, Du24, Yintang. Right: Gb34, St36, Sp9, medial Xiyan, St34, Heding, Sp10, Tb5. Left: Gb41  Number of needles inserted: 12  Number of needles removed: 12  Treatment Observations: Patient stated she relaxed well during treatment.         \"Risks and benefits of acupuncture were discussed with patient. Consent for treatment was given. We thank you for the referral.\"     Hannah Downs L.Ac.    Date:  2022  Time:  3:19 PM    "

## 2022-02-17 NOTE — PROVIDER NOTIFICATION
Paged crosscover to clarify a med. Pt's plts were 100, heparin was due. Still ok to give per Dr. MARIO. Pt ended up refusing the medication anyways.

## 2022-02-17 NOTE — CONSULTS
Initial Care Management consult previously completed. Care Manager to continue to follow as needed.

## 2022-02-17 NOTE — PLAN OF CARE
Occupational Therapy Discharge Summary    Reason for therapy discharge:    Discharged to home.Agreed to home OT.    Progress towards therapy goal(s). See goals on Care Plan in [facility] electronic health record for goal details.  Goals partially met.  Barriers to achieving goals:   discharge from facility.    Therapy recommendation(s):    Continued therapy is recommended.  Rationale/Recommendations:  decreased ADLs.    Goal Outcome Evaluation:    Plan of Care Reviewed With: patient   Mary Reyes, OTR/L  2/17/2022

## 2022-02-17 NOTE — PROGRESS NOTES
PRIMARY DIAGNOSIS: Weakness NURSING  OUTPATIENT/OBSERVATION GOALS TO BE MET BEFORE DISCHARGE:  1. ADLs back to baseline: Yes Using walker and TLSO.    2. Activity and level of assistance: Up with standby assistance.    3. Pain status: Improved-controlled with oral pain medications. Chronic pain.     4. Return to near baseline physical activity: Yes     Discharge Planner Nurse   Safe discharge environment identified: Yes  Discharge home with son to help.  Barriers to discharge: No       Entered by: Tristin Dillard 02/16/2022 6:18 PM     Please review provider order for any additional goals.   Nurse to notify provider when observation goals have been met and patient is ready for discharge.

## 2022-02-17 NOTE — PLAN OF CARE
Problem: Adult Inpatient Plan of Care  Goal: Plan of Care Review  Outcome: Ongoing, Progressing  Flowsheets (Taken 2/17/2022 1345)  Plan of Care Reviewed With: patient   Goal Outcome Evaluation:    Plan of Care Reviewed With: patient        Needs assist of one when up and needs assist with TLSO, inc of  Urine pure wick in place, pain always 6/10, medicated with po Dilaudid  Every four hours, alert cooperative wants to go home needs PT OT   To see before discharge.

## 2022-02-17 NOTE — PLAN OF CARE
PRIMARY DIAGNOSIS: GENERALIZED WEAKNESS    OUTPATIENT/OBSERVATION GOALS TO BE MET BEFORE DISCHARGE  1. Orthostatic performed: N/A    2. Tolerating PO medications: Yes    3. Return to near baseline physical activity: Yes    4. Cleared for discharge by consultants (if involved): Yes    Discharge Planner Nurse   Safe discharge environment identified: Yes  Barriers to discharge: Yes       Entered by: Selam Martinez 02/17/2022 4:31 AM     Please review provider order for any additional goals.   Nurse to notify provider when observation goals have been met and patient is ready for discharge.Goal Outcome Evaluation:

## 2022-02-17 NOTE — DISCHARGE INSTRUCTIONS
You have worsening of one of your fractures (not unexpected given your bone quality), it is very important you wear the brace when you are up and about. Neurosurgery will arrange follow up. Please call [phone] with questions.     WEARING THE TLSO BRACE:    * Wear the brace when out of bed or sitting upright in bed.  * You should apply the brace before standing.  * You may shower seated without brace.   Sit down on shower chair, remove brace   Shower   Dry   Re-apply brace before standing.   Take care not to fall  *If your brace is not fitting call [facility] [phone]  *Check the skin under your brace at least daily.

## 2022-02-17 NOTE — PROGRESS NOTES
Care Management Follow Up    Length of Stay (days): 0    Expected Discharge Date: 02/17/2022     Concerns to be Addressed:     Medical Clearance  Patient plan of care discussed at interdisciplinary rounds: Yes    Anticipated Discharge Disposition:  Home      Anticipated Discharge Services:  Denies  Anticipated Discharge DME:  None anticipated     Patient/family educated on Medicare website which has current facility and service quality ratings:  Declines  Education Provided on the Discharge Plan:  Yes, per team  Patient/Family in Agreement with the Plan:  Yes    Referrals Placed by CM/SW:  None at this time   Private pay costs discussed: Not applicable    Additional Information:  Met with patient in room to discuss therapy recommendations of TCU vs home care. Patient declines TCU and home care at this time. States she has worked with therapy and the past and has information on exercises and can manage on her own. Her home is all on one level and bathroom is near her bedroom. Patient will reach out to her primary care provider after discharge if she feels services are needed. Patient states her son is staying with her and can assist. Also, has support from her neighbors. Has groceries delivered to her home. Has a walker available for use. Patient also reports she has information for a private pay agency that can assist with housekeeping that she plans to call once she returns home. Per patient her son can transport home, she will call him and coordinate transport once she is notified she can discharge. Care manager to follow.   3:25 PM Spoke with Dr. Ag; plan for discharge to home today 2/17, patient now agreeable to home care services for PT and OT. Referrals faxed to home care companies; limited options due to insurance-[employer]. [facility] reviewing and can potentially accept; they have start of care availability for mid next week.       Katy Valdivia RN

## 2022-02-18 NOTE — PLAN OF CARE
Physical Therapy Discharge Summary    Reason for therapy discharge:    Discharged to home with home therapy.    Progress towards therapy goal(s). See goals on Care Plan in [facility] electronic health record for goal details.  Goals partially met.  Barriers to achieving goals:   discharge from facility.    Therapy recommendation(s):    Recommended TCU vs home PT to progress with mobility and strengthening.

## 2022-02-18 NOTE — PLAN OF CARE
Discharge instructions and After Visit Summary provided to patient; patient verbalized understanding. Copy of the After Visit Summary handed to patient. Brace on. Patient took all personal belongings upon discharge.     Problem: Adult Inpatient Plan of Care  Goal: Plan of Care Review  Outcome: Met  Goal: Patient-Specific Goal (Individualized)  Outcome: Met  Goal: Absence of Hospital-Acquired Illness or Injury  Outcome: Met  Intervention: Identify and Manage Fall Risk  Recent Flowsheet Documentation  Taken 2/17/2022 1609 by Joyce Montejo, RN  Safety Promotion/Fall Prevention:   activity supervised   assistive device/personal items within reach   clutter free environment maintained   fall prevention program maintained   nonskid shoes/slippers when out of bed   room organization consistent  Intervention: Prevent and Manage VTE (Venous Thromboembolism) Risk  Recent Flowsheet Documentation  Taken 2/17/2022 1609 by Joyce Montejo, RN  Activity Management: ambulated to bathroom  Taken 2/17/2022 1533 by Joyce Montejo, RN  Activity Management: up in chair  Goal: Optimal Comfort and Wellbeing  Outcome: Met  Goal: Readiness for Transition of Care  Outcome: Met     Problem: OT General Care Plan  Goal: Toilet Transfer/Toileting (OT)  Description: Toilet Transfer/Toileting (OT)  Outcome: Met   Goal Outcome Evaluation:    Plan of Care Reviewed With: patient

## 2022-02-18 NOTE — PROGRESS NOTES
Clinic Care Coordination Contact  [facility]/Voicemail       Clinical Data: Care Coordinator Outreach  Outreach attempted x 1.  Left message on patient's voicemail with call back information and requested return call.   Care Coordinator will try to reach patient again in 1-2 business days.      Janeen Montes De Oca  [phone]  Care

## 2022-02-19 NOTE — PROGRESS NOTES
Clinic Care Coordination Contact  [facility]/Voicemail       Clinical Data: Care Coordinator Outreach  Outreach attempted x 2.  Left message on patient's voicemail with call back information and requested return call.  Plan:  Care Coordinator will do no further outreaches at this time.    Ai ESPANA Community Health Worker  Clinic Care Coordination  [facility]  Phone: [phone]

## 2022-02-19 NOTE — DISCHARGE SUMMARY
[facility]  Hospitalist Discharge Summary      Date of Admission:  2/14/2022  Date of Discharge:  2/17/2022  6:24 PM  Discharging Provider: DIDIER RATLIFF MD      Discharge Diagnoses   Principal Problem:    Compression fracture of T3 vertebra, initial encounter (H)  Active Problems:    Compression fracture of T11 vertebra, initial encounter (H)    Compression fracture of T8 vertebra, initial encounter (H)    Compression fracture of L1 vertebra, initial encounter (H)    Compression fracture of L2 vertebra, initial encounter (H)       Discharge Procedure Orders   Home Care Referral   Referral Priority: Routine: Next available opening Referral Type: Home Health Therapies & Aides   Number of Visits Requested: 1     Reason for your hospital stay   Order Comments: Thoracic fractures     Follow-up and recommended labs and tests   Order Comments: Follow up with primary care provider, SHELL PATHAK, within 7 days to evaluate medication change and for hospital follow- up.  The following labs/tests are recommended: cbc.     Activity   Order Comments: Your activity upon discharge: no heavy lifting for 4 weeks     Order Specific Question Answer Comments   Is discharge order? Yes      Diet   Order Comments: Follow this diet upon discharge: Orders Placed This Encounter      Combination Diet Regular Diet Adult       Order Specific Question Answer Comments   Is discharge order? Yes           Hospital Course   86 year old female with past medical history of thoracic and lumbar compression fracture, chronic pain, recurrent UTI, severe aortic stenosis, recent urgent care visit on 2/2/2022 and 2/9/2022 for back pain/flank pain and treated with 2 course of antibiotics who presented to ED for evaluation of ongoing back pain secondary to thoracic and lumbar fractures and concern for recurrent UTI and admitted for observation.  Patient was seen by neurosurgery     Concern for recurrent UTI;  --Patient was treated  for UTI from urgent care on 2/2/2022 with 3 days course of Bactrim and Cipro and again on 2/9/2022 prescribed 3 more days of Cipro.  --Abnormal UA on admission.  However, no reported dysuria, urgency, normal WBC count, CRP just 1.2, normal lactic acid and hemodynamically stable.  --Recent CT chest abdomen/pelvis from outside hospital dated 2/9/2022 reviewed, reported 3 mm nonobstructing lower pole right kidney stone.  Moderate left kidney atrophy but no reported hydronephrosis or other findings that can suggest pyelonephritis  --Patient received IV Levaquin until her urine culture came back negative, antibiotics discontinued  Back pain and abdominal pain likely secondary to thoracic and lumbar fractures     Acute right flank pain radiating to her anterior abdomen;  --Patient reports pain worse with bending, twisting, when moving around and better with rest.  --As mentioned above, recent CT unremarkable for acute pyelonephritis.  --Likely due to worsening compression fracture, refer below  No abdominal pain today, mostly back pain     Multiple compression fracture of thoracic and lumbar vertebrae;  --Patient had recent CT lumbar and thoracic spine on 2/9/2022 at outside hospital, report reviewed, reported worsening compression fractures.  Seen by neurosurgery, found to have new thoracic fractures at T3, T8 and worsening L2 fractures.  Patient has a past history of multiple compression fractures and is well-known to neurosurgery.  --Continue scheduled Tylenol, home Neurontin and as needed Motrin.  As well as as needed hydromorphone patient reports that she does not tolerate oxycodone.  Also seen by pain team.  Patient to continue wearing TLSO brace when out of bed  Initially recommendation was for TCU, however today seen by PT OT and patient was doing much better.  Will discharge home, agreed with with home care, also family will help her as well.      Thrombocytopenia, chronic, seems fairly stable.  Recheck as  outpatient        Consultations This Hospital Stay   CARE MANAGEMENT / SOCIAL WORK IP CONSULT  PHYSICAL THERAPY ADULT IP CONSULT  OCCUPATIONAL THERAPY ADULT IP CONSULT  PAIN MANAGEMENT ADULT IP CONSULT  NEUROSURGERY IP CONSULT  ORTHOSIS BRACE IP CONSULT  SOCIAL WORK IP CONSULT  OCCUPATIONAL THERAPY ADULT IP CONSULT  ACUPUNCTURE IP CONSULT    Code Status   Prior  Full code       Greater than 35 minutes spent on coordinating discharge, evaluating labs, studies, evaluating patient, evaluating specialist notes    DIDIER RATLIFF MD  [address] [address]  Phone: [phone]  Fax: [phone]  ______________________________________________________________________         Primary Care Physician   SHELL PATHAK    Discharge Orders      Home Care Referral      Reason for your hospital stay    Thoracic fractures     Follow-up and recommended labs and tests    Follow up with primary care provider, SHELL PATHAK, within 7 days to evaluate medication change and for hospital follow- up.  The following labs/tests are recommended: cbc.     Activity    Your activity upon discharge: no heavy lifting for 4 weeks     Diet    Follow this diet upon discharge: Orders Placed This Encounter      Combination Diet Regular Diet Adult         Significant Results and Procedures   Most Recent 3 CBC's:  Recent Labs   Lab Test 02/16/22  1043 02/14/22  1722 08/08/21  0506   WBC 4.6 5.7 4.5   HGB 10.3* 10.5* 8.0*   MCV 98 94 92   * 94* 111*     Most Recent 3 BMP's:  Recent Labs   Lab Test 02/16/22  1153 02/14/22  1722 09/27/21  1415 08/08/21  0506    137  --  139   POTASSIUM 4.4 4.5  --  3.7   CHLORIDE 105 108*  --  108*   CO2 26 26  --  24   BUN 12 20  --  15   CR 0.87 0.77 0.79 0.76   ANIONGAP 6 3*  --  7   LEIF 9.9 9.4 10.0 8.5    92  --  90   ,   Results for orders placed or performed during the hospital encounter of 02/14/22   CT External Imaging Spine     Narrative    Images were obtained from an external facility.  Click PACS Images   hyperlink to view images.  Textual results have been scanned into the   media tab.   CT External Imaging Spine    Narrative    Images were obtained from an external facility.  Click PACS Images   hyperlink to view images.  Textual results have been scanned into the   media tab.   XR Thoracic Lumbar Spine 2 Views    Narrative    EXAM: XR THORACIC LUMBAR SPINE 2 VW  LOCATION: [facility]  DATE/TIME: 2/16/2022 2:06 PM    INDICATION: T3, T8, L2 fractures new or worsening.  COMPARISON: CT thoracic and lumbar spine 02/09/2022.  TECHNIQUE: CR Thoraco-Lumbar Spine.      Impression    IMPRESSION: There is moderate convex right lumbar curvature apex at the L2-L3 level and measuring approximately 27 degrees from the superior T12 to the inferior L4 endplate.    There is severe approximately 60% height loss at the T8 level, progressed in the interval. There is severe anterior wedging of T11 with 70% or greater anterior height loss. There is severe anterior wedging of L1 and L2 with near complete central height   loss, similar at L1 and slightly progressed at L2. Vertebral body heights are otherwise preserved. Osseous structures appear demineralized.    Shoulder prosthesis obscure the upper thoracic levels on the lateral view.    Visualized portion of the lungs are clear. Coarse mitral annulus calcifications. Unremarkable bowel gas pattern.       Discharge Medications   Discharge Medication List as of 2/17/2022  5:09 PM      START taking these medications    Details   acetaminophen (TYLENOL) 325 MG tablet Take 3 tablets (975 mg) by mouth 3 times daily, OTC      diclofenac (VOLTAREN) 1 % topical gel Apply 2 g topically 4 times daily, Disp-50 g, R-0, E-Prescribe      HYDROmorphone (DILAUDID) 2 MG tablet Take 1 tablet (2 mg) by mouth 3 times daily as needed for severe pain, Disp-10 tablet, R-0, E-Prescribe      Lidocaine  "(LIDOCARE) 4 % Patch Place 2 patches onto the skin every 24 hours To prevent lidocaine toxicity, patient should be patch free for 12 hrs daily.Disp-30 patch, [id number]      polyethylene glycol (MIRALAX) 17 g packet Take 17 g by mouth daily, Disp-30 packet, R-0, E-Prescribe         CONTINUE these medications which have NOT CHANGED    Details   Calcium Carb-Cholecalciferol (CALCIUM 500 + D) 500-125 MG-UNIT TABS Take 1 tablet by mouth 2 times daily , Historical      gabapentin (NEURONTIN) 300 MG capsule Take 300 mg by mouth 3 times daily , Historical      IBUPROFEN PO Take 400 mg by mouth every 8 hours as needed , Historical      melatonin 5 MG tablet Take 5 mg by mouth nightly as needed for sleep, Historical      Vitamin D3 (CHOLECALCIFEROL) 25 mcg (1000 units) tablet Take 1 tablet by mouth daily, Historical           Allergies   Allergies   Allergen Reactions     Allopurinol Other (See Comments)     Cefdinir Hives     Clindamycin Nausea     C.Diff     Codeine Nausea and Vomiting     Tolerates oral morphine.     Flagyl [Metronidazole] Angioedema     Sulfa (Sulfonamide Antibiotics) [Sulfa Drugs] Nausea     Canker sores     Tramadol Unknown     Upset stomach       Physical Exam:       /59 (BP Location: Right arm)   Pulse 80   Temp 98.3  F (36.8  C) (Oral)   Resp 18   Ht 1.575 m (5' 2\")   Wt 46.3 kg (102 lb)   SpO2 95%   BMI 18.66 kg/m    General appearance: alert, appears stated age and cooperative  Head: Normocephalic, without obvious abnormality, atraumatic  Eyes: Clear conjuctiva  Neck: no JVD and supple, symmetrical, trachea midline  Lungs: clear to auscultation bilaterally  Heart: regular rate and rhythm, S1, S2 normal, no murmur, click, rub or gallop  Abdomen: soft, non-tender; bowel sounds normal; no masses,  no organomegaly  Extremities: Won's sign is negative, no sign of DVT  Skin: Skin color, texture, turgor normal. No rashes or lesions  Neurologic: Mental status: Alert, oriented, thought " content appropriate  Cranial nerves: normal  Sensory: normal  Motor:grossly normal

## 2022-12-13 NOTE — TELEPHONE ENCOUNTER
[facility] Call Center    Phone Message    May a detailed message be left on voicemail: no     Reason for Call: Other: Pily called to schedule a new cardiology appointment in [place] for Aortic Stenosis. Pily is specifically requesting Dr. Muñoz. Please reach out to her at [phone].     Action Taken: Other: [place] [facility]    Travel Screening: Not Applicable

## 2022-12-13 NOTE — TELEPHONE ENCOUNTER
Dot Ledesma  [facility] Scheduling Registration Pool - [facility] 44 minutes ago (2:26 PM)     DH  Per protocol/ New Aortic Stenosis

## 2022-12-13 NOTE — TELEPHONE ENCOUNTER
"Per Dr. Orellana inpatient (8/2021):    \"Severe aortic valve stenosis, last documented in 2015.  Patient has had no subsequent evaluation of her aortic valve as she elected at that time not to pursue any intervention.  Echocardiogram reveals moderate to severe concentric left ventricular hypertrophy with normal systolic function.  There is evidence of elevated left ventricular filling pressures.  Severe aortic valve stenosis is again noted with mean gradient of 44 mmHg.  Again briefly discussed option of possible TAVR as a means of treating her aortic valve disease.  Again she does not appear to be interested in pursuing this.  She understands this could ultimately lead to her death.\"      If pt continues to not want to pursue any treatment for her aortic stenosis, then please schedule with general cardiology.    Thanks  "

## 2022-12-14 NOTE — TELEPHONE ENCOUNTER
"Patient called with concern of SOB and edema of her feet and ankles. She is not established. Patient says this has been occurring for a while, mainly noticing being SOB when she is walking. Patient says she had seen her PCP who had her wearing compression socks. Patient states she had seen Dr. Muñoz 10 years ago. She currently has an appointment with Lisy Orellana scheduled for 2/22/23 and no other sooner appointments available. Informed patient to keep in contact with her PCP for symptoms or to present to ED should they worsen. Will also route to the nurses with Dr. Muñoz if they would like to contact patient.     1. RESPIRATORY STATUS: \"Describe your breathing?\" (e.g., wheezing, shortness of breath, unable to speak, severe coughing) SOB.  2. ONSET: \"When did this breathing problem begin?\" She states for a while.  3. PATTERN \"Does the difficult breathing come and go, or has it been constant since it started?\" She notices mainly with walking.  4. SEVERITY: \"How bad is your breathing?\" (e.g., mild, moderate, severe) Mild to moderate.  - MILD: No SOB at rest, mild SOB with walking, speaks normally in sentences, can lie down, no retractions, pulse < 100.   - MODERATE: SOB at rest, SOB with minimal exertion and prefers to sit, cannot lie down flat, speaks in phrases, mild retractions, audible wheezing, pulse 100-120.   - SEVERE: Very SOB at rest, speaks in single words, struggling to breathe, sitting hunched forward, retractions, pulse > 120   5. RECURRENT SYMPTOM: \"Have you had difficulty breathing before?\" If Yes, ask: \"When was the last time?\" and \"What happened that time?\" Patient says new symptom.  6. CARDIAC HISTORY: \"Do you have any history of heart disease?\" (e.g., heart attack, angina, bypass surgery, angioplasty) None in history. Patient is not yet established.  7. LUNG HISTORY: \"Do you have any history of lung disease?\" (e.g., pulmonary embolus, asthma, emphysema) None in history.  8. CAUSE: \"What do you " "think is causing the breathing problem?\" Cardiac-related.  9. OTHER SYMPTOMS: \"Do you have any other symptoms? (e.g., dizziness, runny nose, cough, chest pain, fever) Edema of her feet and ankles.  10. O2 SATURATION MONITOR: \"Do you use an oxygen saturation monitor (pulse oximeter) at home?\" If Yes, \"What is your reading (oxygen level) today?\" \"What is your usual oxygen saturation reading?\" (e.g., 95%) Not provided.    "

## 2022-12-21 NOTE — LETTER
December 29, 2022          Shirley Phipps  [address]  [facility] [address]        Dear Pily,      I received a notification today re the medication Evenity - that we previously discussed for osteoporosis. It doesn't look like you ended up starting this. If you would like to review treatment for osteoporosis you are welcome to give us a call to schedule. You can call # [phone] to schedule.       Take care,    Dr. Reyes

## 2023-01-01 ENCOUNTER — APPOINTMENT (OUTPATIENT)
Dept: CT IMAGING | Facility: HOSPITAL | Age: 88
DRG: 208 | End: 2023-01-01
Attending: INTERNAL MEDICINE
Payer: COMMERCIAL

## 2023-01-01 ENCOUNTER — APPOINTMENT (OUTPATIENT)
Dept: RADIOLOGY | Facility: HOSPITAL | Age: 88
DRG: 208 | End: 2023-01-01
Attending: INTERNAL MEDICINE
Payer: COMMERCIAL

## 2023-01-01 ENCOUNTER — APPOINTMENT (OUTPATIENT)
Dept: ULTRASOUND IMAGING | Facility: HOSPITAL | Age: 88
DRG: 208 | End: 2023-01-01
Attending: INTERNAL MEDICINE
Payer: COMMERCIAL

## 2023-01-01 ENCOUNTER — APPOINTMENT (OUTPATIENT)
Dept: RADIOLOGY | Facility: HOSPITAL | Age: 88
DRG: 208 | End: 2023-01-01
Attending: NURSE PRACTITIONER
Payer: COMMERCIAL

## 2023-01-01 ENCOUNTER — APPOINTMENT (OUTPATIENT)
Dept: CARDIOLOGY | Facility: HOSPITAL | Age: 88
DRG: 208 | End: 2023-01-01
Attending: INTERNAL MEDICINE
Payer: COMMERCIAL

## 2023-01-01 ENCOUNTER — ANESTHESIA EVENT (OUTPATIENT)
Dept: EMERGENCY MEDICINE | Facility: HOSPITAL | Age: 88
DRG: 208 | End: 2023-01-01
Payer: COMMERCIAL

## 2023-01-01 ENCOUNTER — APPOINTMENT (OUTPATIENT)
Dept: CT IMAGING | Facility: HOSPITAL | Age: 88
DRG: 208 | End: 2023-01-01
Attending: EMERGENCY MEDICINE
Payer: COMMERCIAL

## 2023-01-01 ENCOUNTER — TELEPHONE (OUTPATIENT)
Dept: CARDIOLOGY | Facility: CLINIC | Age: 88
End: 2023-01-01

## 2023-01-01 ENCOUNTER — HOSPITAL ENCOUNTER (INPATIENT)
Facility: HOSPITAL | Age: 88
LOS: 3 days | DRG: 208 | End: 2023-01-05
Attending: EMERGENCY MEDICINE | Admitting: INTERNAL MEDICINE
Payer: COMMERCIAL

## 2023-01-01 VITALS
TEMPERATURE: 99.1 F | HEIGHT: 63 IN | DIASTOLIC BLOOD PRESSURE: 43 MMHG | WEIGHT: 92.7 LBS | SYSTOLIC BLOOD PRESSURE: 71 MMHG | OXYGEN SATURATION: 76 % | BODY MASS INDEX: 16.43 KG/M2

## 2023-01-01 DIAGNOSIS — I50.9 ACUTE DECOMPENSATED HEART FAILURE (H): Primary | ICD-10-CM

## 2023-01-01 DIAGNOSIS — I50.21 ACUTE HFREF (HEART FAILURE WITH REDUCED EJECTION FRACTION) (H): ICD-10-CM

## 2023-01-01 DIAGNOSIS — J90 PLEURAL EFFUSION: ICD-10-CM

## 2023-01-01 DIAGNOSIS — I31.39 PERICARDIAL EFFUSION: ICD-10-CM

## 2023-01-01 DIAGNOSIS — R09.02 HYPOXIA: ICD-10-CM

## 2023-01-01 LAB
% LINING CELLS, BODY FLUID: 5 %
ALBUMIN SERPL BCG-MCNC: 4.6 G/DL (ref 3.5–5.2)
ALBUMIN UR-MCNC: 10 MG/DL
ALP SERPL-CCNC: 69 U/L (ref 35–104)
ALT SERPL W P-5'-P-CCNC: 23 U/L (ref 10–35)
ANION GAP SERPL CALCULATED.3IONS-SCNC: 10 MMOL/L (ref 7–15)
ANION GAP SERPL CALCULATED.3IONS-SCNC: 6 MMOL/L (ref 7–15)
ANION GAP SERPL CALCULATED.3IONS-SCNC: 8 MMOL/L (ref 7–15)
ANION GAP SERPL CALCULATED.3IONS-SCNC: 9 MMOL/L (ref 7–15)
APPEARANCE FLD: CLEAR
APPEARANCE UR: CLEAR
AST SERPL W P-5'-P-CCNC: 22 U/L (ref 10–35)
BASE EXCESS BLDA CALC-SCNC: 0.4 MMOL/L
BASE EXCESS BLDV CALC-SCNC: -3.9 MMOL/L
BASE EXCESS BLDV CALC-SCNC: 2.4 MMOL/L
BASOPHILS # BLD MANUAL: 0 10E3/UL (ref 0–0.2)
BASOPHILS NFR BLD MANUAL: 0 %
BILIRUB DIRECT SERPL-MCNC: 0.21 MG/DL (ref 0–0.3)
BILIRUB SERPL-MCNC: 0.7 MG/DL
BILIRUB UR QL STRIP: NEGATIVE
BUN SERPL-MCNC: 23.8 MG/DL (ref 8–23)
BUN SERPL-MCNC: 25.4 MG/DL (ref 8–23)
BUN SERPL-MCNC: 25.4 MG/DL (ref 8–23)
BUN SERPL-MCNC: 27.2 MG/DL (ref 8–23)
CALCIUM SERPL-MCNC: 9 MG/DL (ref 8.8–10.2)
CALCIUM SERPL-MCNC: 9 MG/DL (ref 8.8–10.2)
CALCIUM SERPL-MCNC: 9.4 MG/DL (ref 8.8–10.2)
CALCIUM SERPL-MCNC: 9.9 MG/DL (ref 8.8–10.2)
CELL COUNT BODY FLUID SOURCE: NORMAL
CHLORIDE SERPL-SCNC: 106 MMOL/L (ref 98–107)
CHLORIDE SERPL-SCNC: 107 MMOL/L (ref 98–107)
CHLORIDE SERPL-SCNC: 108 MMOL/L (ref 98–107)
CHLORIDE SERPL-SCNC: 110 MMOL/L (ref 98–107)
COHGB MFR BLD: 100 % (ref 95–96)
COLOR FLD: YELLOW
COLOR UR AUTO: ABNORMAL
CREAT SERPL-MCNC: 0.95 MG/DL (ref 0.51–0.95)
CREAT SERPL-MCNC: 0.95 MG/DL (ref 0.51–0.95)
CREAT SERPL-MCNC: 1.01 MG/DL (ref 0.51–0.95)
CREAT SERPL-MCNC: 1.21 MG/DL (ref 0.51–0.95)
D DIMER PPP FEU-MCNC: 1.89 UG/ML FEU (ref 0–0.5)
DEPRECATED HCO3 PLAS-SCNC: 25 MMOL/L (ref 22–29)
DEPRECATED HCO3 PLAS-SCNC: 27 MMOL/L (ref 22–29)
DEPRECATED HCO3 PLAS-SCNC: 30 MMOL/L (ref 22–29)
DEPRECATED HCO3 PLAS-SCNC: 34 MMOL/L (ref 22–29)
EOSINOPHIL # BLD MANUAL: 0 10E3/UL (ref 0–0.7)
EOSINOPHIL NFR BLD MANUAL: 0 %
ERYTHROCYTE [DISTWIDTH] IN BLOOD BY AUTOMATED COUNT: 15.4 % (ref 10–15)
ERYTHROCYTE [DISTWIDTH] IN BLOOD BY AUTOMATED COUNT: 15.9 % (ref 10–15)
ERYTHROCYTE [DISTWIDTH] IN BLOOD BY AUTOMATED COUNT: 16 % (ref 10–15)
ERYTHROCYTE [DISTWIDTH] IN BLOOD BY AUTOMATED COUNT: 16.1 % (ref 10–15)
FLUAV RNA SPEC QL NAA+PROBE: NEGATIVE
FLUBV RNA RESP QL NAA+PROBE: NEGATIVE
FRAGMENTS BLD QL SMEAR: SLIGHT
GFR SERPL CREATININE-BSD FRML MDRD: 43 ML/MIN/1.73M2
GFR SERPL CREATININE-BSD FRML MDRD: 54 ML/MIN/1.73M2
GFR SERPL CREATININE-BSD FRML MDRD: 58 ML/MIN/1.73M2
GFR SERPL CREATININE-BSD FRML MDRD: 58 ML/MIN/1.73M2
GLUCOSE BLDC GLUCOMTR-MCNC: 102 MG/DL (ref 70–99)
GLUCOSE BLDC GLUCOMTR-MCNC: 123 MG/DL (ref 70–99)
GLUCOSE BLDC GLUCOMTR-MCNC: 142 MG/DL (ref 70–99)
GLUCOSE BLDC GLUCOMTR-MCNC: 145 MG/DL (ref 70–99)
GLUCOSE BLDC GLUCOMTR-MCNC: 156 MG/DL (ref 70–99)
GLUCOSE BLDC GLUCOMTR-MCNC: 171 MG/DL (ref 70–99)
GLUCOSE BLDC GLUCOMTR-MCNC: 172 MG/DL (ref 70–99)
GLUCOSE BLDC GLUCOMTR-MCNC: 175 MG/DL (ref 70–99)
GLUCOSE BLDC GLUCOMTR-MCNC: 88 MG/DL (ref 70–99)
GLUCOSE BODY FLUID SOURCE: NORMAL
GLUCOSE FLD-MCNC: 111 MG/DL
GLUCOSE SERPL-MCNC: 114 MG/DL (ref 70–99)
GLUCOSE SERPL-MCNC: 131 MG/DL (ref 70–99)
GLUCOSE SERPL-MCNC: 161 MG/DL (ref 70–99)
GLUCOSE SERPL-MCNC: 177 MG/DL (ref 70–99)
GLUCOSE UR STRIP-MCNC: NEGATIVE MG/DL
HCO3 BLD-SCNC: 26 MMOL/L (ref 23–29)
HCO3 BLDV-SCNC: 27 MMOL/L (ref 24–30)
HCO3 BLDV-SCNC: 30 MMOL/L (ref 24–30)
HCT VFR BLD AUTO: 32.3 % (ref 35–47)
HCT VFR BLD AUTO: 34.3 % (ref 35–47)
HCT VFR BLD AUTO: 37.2 % (ref 35–47)
HCT VFR BLD AUTO: 38.8 % (ref 35–47)
HGB BLD-MCNC: 10.2 G/DL (ref 11.7–15.7)
HGB BLD-MCNC: 10.6 G/DL (ref 11.7–15.7)
HGB BLD-MCNC: 10.8 G/DL (ref 11.7–15.7)
HGB BLD-MCNC: 11.7 G/DL (ref 11.7–15.7)
HGB UR QL STRIP: ABNORMAL
HOLD SPECIMEN: NORMAL
HYALINE CASTS: 1 /LPF
INR PPP: 1.15 (ref 0.85–1.15)
INR PPP: 1.39 (ref 0.85–1.15)
KETONES UR STRIP-MCNC: NEGATIVE MG/DL
LD BODY BODY FLUID SOURCE: NORMAL
LDH FLD L TO P-CCNC: 74 U/L
LDH SERPL L TO P-CCNC: 283 U/L (ref 0–250)
LDH SERPL L TO P-CCNC: 293 U/L (ref 0–250)
LEUKOCYTE ESTERASE UR QL STRIP: NEGATIVE
LVEF ECHO: NORMAL
LYMPHOCYTES # BLD MANUAL: 0.6 10E3/UL (ref 0.8–5.3)
LYMPHOCYTES NFR BLD MANUAL: 4 %
LYMPHOCYTES NFR FLD MANUAL: 78 %
MAGNESIUM SERPL-MCNC: 1.8 MG/DL (ref 1.7–2.3)
MAGNESIUM SERPL-MCNC: 1.8 MG/DL (ref 1.7–2.3)
MAGNESIUM SERPL-MCNC: 2.1 MG/DL (ref 1.7–2.3)
MCH RBC QN AUTO: 29.7 PG (ref 26.5–33)
MCH RBC QN AUTO: 29.8 PG (ref 26.5–33)
MCH RBC QN AUTO: 29.9 PG (ref 26.5–33)
MCH RBC QN AUTO: 30 PG (ref 26.5–33)
MCHC RBC AUTO-ENTMCNC: 28.5 G/DL (ref 31.5–36.5)
MCHC RBC AUTO-ENTMCNC: 30.2 G/DL (ref 31.5–36.5)
MCHC RBC AUTO-ENTMCNC: 31.5 G/DL (ref 31.5–36.5)
MCHC RBC AUTO-ENTMCNC: 31.6 G/DL (ref 31.5–36.5)
MCV RBC AUTO: 105 FL (ref 78–100)
MCV RBC AUTO: 94 FL (ref 78–100)
MCV RBC AUTO: 95 FL (ref 78–100)
MCV RBC AUTO: 99 FL (ref 78–100)
METAMYELOCYTES # BLD MANUAL: 0.2 10E3/UL
METAMYELOCYTES NFR BLD MANUAL: 1 %
MONOCYTES # BLD MANUAL: 2.2 10E3/UL (ref 0–1.3)
MONOCYTES NFR BLD MANUAL: 14 %
MONOS+MACROS NFR FLD MANUAL: 8 %
MYELOCYTES # BLD MANUAL: 0.3 10E3/UL
MYELOCYTES NFR BLD MANUAL: 2 %
NEUTROPHILS # BLD MANUAL: 12.2 10E3/UL (ref 1.6–8.3)
NEUTROPHILS NFR BLD MANUAL: 79 %
NEUTS BAND NFR FLD MANUAL: 9 %
NITRATE UR QL: NEGATIVE
NT-PROBNP SERPL-MCNC: ABNORMAL PG/ML (ref 0–1800)
OXYHGB MFR BLD: 98.5 % (ref 95–96)
OXYHGB MFR BLDV: 71 % (ref 70–75)
OXYHGB MFR BLDV: 93 % (ref 70–75)
PCO2 BLD: 44 MM HG (ref 35–45)
PCO2 BLDV: 71 MM HG (ref 35–50)
PCO2 BLDV: 95 MM HG (ref 35–50)
PH BLD: 7.37 [PH] (ref 7.37–7.44)
PH BLDV: 7.05 [PH] (ref 7.35–7.45)
PH BLDV: 7.23 [PH] (ref 7.35–7.45)
PH BODY FLUID SOURCE: NORMAL
PH FLD: 8 PH
PH UR STRIP: 5.5 [PH] (ref 5–7)
PHOSPHATE SERPL-MCNC: 2.5 MG/DL (ref 2.5–4.5)
PLAT MORPH BLD: ABNORMAL
PLATELET # BLD AUTO: 114 10E3/UL (ref 150–450)
PLATELET # BLD AUTO: 76 10E3/UL (ref 150–450)
PLATELET # BLD AUTO: 90 10E3/UL (ref 150–450)
PLATELET # BLD AUTO: 94 10E3/UL (ref 150–450)
PO2 BLD: 108 MM HG (ref 75–85)
PO2 BLDV: 44 MM HG (ref 25–47)
PO2 BLDV: 89 MM HG (ref 25–47)
POTASSIUM SERPL-SCNC: 2.7 MMOL/L (ref 3.4–5.3)
POTASSIUM SERPL-SCNC: 3.4 MMOL/L (ref 3.4–5.3)
POTASSIUM SERPL-SCNC: 3.5 MMOL/L (ref 3.4–5.3)
POTASSIUM SERPL-SCNC: 4.1 MMOL/L (ref 3.4–5.3)
POTASSIUM SERPL-SCNC: 4.6 MMOL/L (ref 3.4–5.3)
POTASSIUM SERPL-SCNC: 4.8 MMOL/L (ref 3.4–5.3)
PROCALCITONIN SERPL IA-MCNC: 0.08 NG/ML
PROT FLD-MCNC: 2.1 G/DL
PROT SERPL-MCNC: 6 G/DL (ref 6.4–8.3)
PROT SERPL-MCNC: 7.3 G/DL (ref 6.4–8.3)
PROTEIN BODY FLUID SOURCE: NORMAL
RBC # BLD AUTO: 3.4 10E6/UL (ref 3.8–5.2)
RBC # BLD AUTO: 3.54 10E6/UL (ref 3.8–5.2)
RBC # BLD AUTO: 3.64 10E6/UL (ref 3.8–5.2)
RBC # BLD AUTO: 3.92 10E6/UL (ref 3.8–5.2)
RBC # FLD: 1000 /UL
RBC MORPH BLD: ABNORMAL
RBC URINE: 8 /HPF
RSV RNA SPEC NAA+PROBE: NEGATIVE
SAO2 % BLDV: 72.5 % (ref 70–75)
SAO2 % BLDV: 94.5 % (ref 70–75)
SARS-COV-2 RNA RESP QL NAA+PROBE: NEGATIVE
SODIUM SERPL-SCNC: 143 MMOL/L (ref 136–145)
SODIUM SERPL-SCNC: 145 MMOL/L (ref 136–145)
SODIUM SERPL-SCNC: 146 MMOL/L (ref 136–145)
SODIUM SERPL-SCNC: 146 MMOL/L (ref 136–145)
SP GR UR STRIP: 1.02 (ref 1–1.03)
SQUAMOUS EPITHELIAL: <1 /HPF
TEMPERATURE: 37 DEGREES C
TROPONIN T SERPL HS-MCNC: 62 NG/L
TROPONIN T SERPL HS-MCNC: 74 NG/L
TROPONIN T SERPL HS-MCNC: 80 NG/L
TSH SERPL DL<=0.005 MIU/L-ACNC: 0.39 UIU/ML (ref 0.3–4.2)
UROBILINOGEN UR STRIP-MCNC: <2 MG/DL
WBC # BLD AUTO: 11.3 10E3/UL (ref 4–11)
WBC # BLD AUTO: 12.7 10E3/UL (ref 4–11)
WBC # BLD AUTO: 15.4 10E3/UL (ref 4–11)
WBC # BLD AUTO: 8.8 10E3/UL (ref 4–11)
WBC # FLD AUTO: 106 /UL
WBC URINE: 1 /HPF

## 2023-01-01 PROCEDURE — 83735 ASSAY OF MAGNESIUM: CPT | Performed by: INTERNAL MEDICINE

## 2023-01-01 PROCEDURE — 272N000452 HC KIT SHRLOCK 5FR POWER PICC TRIPLE LUMEN

## 2023-01-01 PROCEDURE — 82248 BILIRUBIN DIRECT: CPT | Performed by: EMERGENCY MEDICINE

## 2023-01-01 PROCEDURE — 84484 ASSAY OF TROPONIN QUANT: CPT | Performed by: INTERNAL MEDICINE

## 2023-01-01 PROCEDURE — 87637 SARSCOV2&INF A&B&RSV AMP PRB: CPT | Performed by: EMERGENCY MEDICINE

## 2023-01-01 PROCEDURE — 88112 CYTOPATH CELL ENHANCE TECH: CPT | Mod: 26 | Performed by: PATHOLOGY

## 2023-01-01 PROCEDURE — 80053 COMPREHEN METABOLIC PANEL: CPT | Performed by: EMERGENCY MEDICINE

## 2023-01-01 PROCEDURE — 99291 CRITICAL CARE FIRST HOUR: CPT | Performed by: INTERNAL MEDICINE

## 2023-01-01 PROCEDURE — 83880 ASSAY OF NATRIURETIC PEPTIDE: CPT | Performed by: EMERGENCY MEDICINE

## 2023-01-01 PROCEDURE — 3E033XZ INTRODUCTION OF VASOPRESSOR INTO PERIPHERAL VEIN, PERCUTANEOUS APPROACH: ICD-10-PCS | Performed by: INTERNAL MEDICINE

## 2023-01-01 PROCEDURE — 94003 VENT MGMT INPAT SUBQ DAY: CPT

## 2023-01-01 PROCEDURE — 250N000011 HC RX IP 250 OP 636: Performed by: EMERGENCY MEDICINE

## 2023-01-01 PROCEDURE — 999N000065 XR CHEST PORT 1 VIEW

## 2023-01-01 PROCEDURE — 250N000009 HC RX 250: Performed by: NURSE PRACTITIONER

## 2023-01-01 PROCEDURE — 36415 COLL VENOUS BLD VENIPUNCTURE: CPT | Performed by: INTERNAL MEDICINE

## 2023-01-01 PROCEDURE — 99291 CRITICAL CARE FIRST HOUR: CPT | Performed by: NURSE PRACTITIONER

## 2023-01-01 PROCEDURE — 70450 CT HEAD/BRAIN W/O DYE: CPT

## 2023-01-01 PROCEDURE — 250N000013 HC RX MED GY IP 250 OP 250 PS 637: Performed by: INTERNAL MEDICINE

## 2023-01-01 PROCEDURE — 200N000001 HC R&B ICU

## 2023-01-01 PROCEDURE — 85027 COMPLETE CBC AUTOMATED: CPT | Performed by: NURSE PRACTITIONER

## 2023-01-01 PROCEDURE — 82805 BLOOD GASES W/O2 SATURATION: CPT | Performed by: INTERNAL MEDICINE

## 2023-01-01 PROCEDURE — 88305 TISSUE EXAM BY PATHOLOGIST: CPT | Mod: 26 | Performed by: PATHOLOGY

## 2023-01-01 PROCEDURE — 32555 ASPIRATE PLEURA W/ IMAGING: CPT

## 2023-01-01 PROCEDURE — 94002 VENT MGMT INPAT INIT DAY: CPT

## 2023-01-01 PROCEDURE — 93005 ELECTROCARDIOGRAM TRACING: CPT | Performed by: EMERGENCY MEDICINE

## 2023-01-01 PROCEDURE — 89050 BODY FLUID CELL COUNT: CPT | Performed by: INTERNAL MEDICINE

## 2023-01-01 PROCEDURE — 83615 LACTATE (LD) (LDH) ENZYME: CPT | Performed by: INTERNAL MEDICINE

## 2023-01-01 PROCEDURE — C9803 HOPD COVID-19 SPEC COLLECT: HCPCS

## 2023-01-01 PROCEDURE — 84132 ASSAY OF SERUM POTASSIUM: CPT | Performed by: NURSE PRACTITIONER

## 2023-01-01 PROCEDURE — 250N000009 HC RX 250: Performed by: INTERNAL MEDICINE

## 2023-01-01 PROCEDURE — 250N000011 HC RX IP 250 OP 636: Performed by: INTERNAL MEDICINE

## 2023-01-01 PROCEDURE — 87040 BLOOD CULTURE FOR BACTERIA: CPT | Performed by: NURSE PRACTITIONER

## 2023-01-01 PROCEDURE — 84100 ASSAY OF PHOSPHORUS: CPT | Performed by: NURSE PRACTITIONER

## 2023-01-01 PROCEDURE — 5A1945Z RESPIRATORY VENTILATION, 24-96 CONSECUTIVE HOURS: ICD-10-PCS | Performed by: INTERNAL MEDICINE

## 2023-01-01 PROCEDURE — 272N000710 US THORACENTESIS

## 2023-01-01 PROCEDURE — 85610 PROTHROMBIN TIME: CPT | Performed by: INTERNAL MEDICINE

## 2023-01-01 PROCEDURE — 250N000013 HC RX MED GY IP 250 OP 250 PS 637: Performed by: NURSE PRACTITIONER

## 2023-01-01 PROCEDURE — 250N000011 HC RX IP 250 OP 636: Performed by: NURSE PRACTITIONER

## 2023-01-01 PROCEDURE — 999N000009 HC STATISTIC AIRWAY CARE

## 2023-01-01 PROCEDURE — 258N000003 HC RX IP 258 OP 636: Performed by: INTERNAL MEDICINE

## 2023-01-01 PROCEDURE — 99207 PR NO BILLABLE SERVICE THIS VISIT: CPT | Performed by: INTERNAL MEDICINE

## 2023-01-01 PROCEDURE — 80048 BASIC METABOLIC PNL TOTAL CA: CPT | Performed by: INTERNAL MEDICINE

## 2023-01-01 PROCEDURE — 99233 SBSQ HOSP IP/OBS HIGH 50: CPT | Performed by: INTERNAL MEDICINE

## 2023-01-01 PROCEDURE — 81001 URINALYSIS AUTO W/SCOPE: CPT | Performed by: EMERGENCY MEDICINE

## 2023-01-01 PROCEDURE — 84155 ASSAY OF PROTEIN SERUM: CPT | Performed by: INTERNAL MEDICINE

## 2023-01-01 PROCEDURE — 84157 ASSAY OF PROTEIN OTHER: CPT | Performed by: INTERNAL MEDICINE

## 2023-01-01 PROCEDURE — 93306 TTE W/DOPPLER COMPLETE: CPT | Mod: 26 | Performed by: INTERNAL MEDICINE

## 2023-01-01 PROCEDURE — 36415 COLL VENOUS BLD VENIPUNCTURE: CPT | Performed by: EMERGENCY MEDICINE

## 2023-01-01 PROCEDURE — 87205 SMEAR GRAM STAIN: CPT | Performed by: INTERNAL MEDICINE

## 2023-01-01 PROCEDURE — 71275 CT ANGIOGRAPHY CHEST: CPT

## 2023-01-01 PROCEDURE — 84443 ASSAY THYROID STIM HORMONE: CPT | Performed by: EMERGENCY MEDICINE

## 2023-01-01 PROCEDURE — 99232 SBSQ HOSP IP/OBS MODERATE 35: CPT | Performed by: INTERNAL MEDICINE

## 2023-01-01 PROCEDURE — 258N000003 HC RX IP 258 OP 636: Performed by: NURSE ANESTHETIST, CERTIFIED REGISTERED

## 2023-01-01 PROCEDURE — 999N000287 HC ICU ADULT ROUNDING, EACH 10 MINS

## 2023-01-01 PROCEDURE — 272N000004 HC RX 272: Performed by: NURSE PRACTITIONER

## 2023-01-01 PROCEDURE — 84145 PROCALCITONIN (PCT): CPT | Performed by: EMERGENCY MEDICINE

## 2023-01-01 PROCEDURE — 82945 GLUCOSE OTHER FLUID: CPT | Performed by: INTERNAL MEDICINE

## 2023-01-01 PROCEDURE — 99223 1ST HOSP IP/OBS HIGH 75: CPT | Performed by: INTERNAL MEDICINE

## 2023-01-01 PROCEDURE — 999N000157 HC STATISTIC RCP TIME EA 10 MIN

## 2023-01-01 PROCEDURE — 85018 HEMOGLOBIN: CPT | Performed by: EMERGENCY MEDICINE

## 2023-01-01 PROCEDURE — 88305 TISSUE EXAM BY PATHOLOGIST: CPT | Mod: TC | Performed by: INTERNAL MEDICINE

## 2023-01-01 PROCEDURE — 250N000012 HC RX MED GY IP 250 OP 636 PS 637: Performed by: NURSE PRACTITIONER

## 2023-01-01 PROCEDURE — C9113 INJ PANTOPRAZOLE SODIUM, VIA: HCPCS | Performed by: INTERNAL MEDICINE

## 2023-01-01 PROCEDURE — 36600 WITHDRAWAL OF ARTERIAL BLOOD: CPT

## 2023-01-01 PROCEDURE — 250N000011 HC RX IP 250 OP 636: Performed by: NURSE ANESTHETIST, CERTIFIED REGISTERED

## 2023-01-01 PROCEDURE — 94660 CPAP INITIATION&MGMT: CPT

## 2023-01-01 PROCEDURE — 96374 THER/PROPH/DIAG INJ IV PUSH: CPT | Mod: 59

## 2023-01-01 PROCEDURE — 80048 BASIC METABOLIC PNL TOTAL CA: CPT | Performed by: NURSE PRACTITIONER

## 2023-01-01 PROCEDURE — 93306 TTE W/DOPPLER COMPLETE: CPT

## 2023-01-01 PROCEDURE — 99292 CRITICAL CARE ADDL 30 MIN: CPT | Performed by: INTERNAL MEDICINE

## 2023-01-01 PROCEDURE — 85027 COMPLETE CBC AUTOMATED: CPT | Performed by: INTERNAL MEDICINE

## 2023-01-01 PROCEDURE — 73100 X-RAY EXAM OF WRIST: CPT | Mod: LT

## 2023-01-01 PROCEDURE — 71045 X-RAY EXAM CHEST 1 VIEW: CPT

## 2023-01-01 PROCEDURE — 999N000065 XR ABDOMEN PORT 1 VIEW

## 2023-01-01 PROCEDURE — 99239 HOSP IP/OBS DSCHRG MGMT >30: CPT | Performed by: HOSPITALIST

## 2023-01-01 PROCEDURE — 85007 BL SMEAR W/DIFF WBC COUNT: CPT | Performed by: INTERNAL MEDICINE

## 2023-01-01 PROCEDURE — 85379 FIBRIN DEGRADATION QUANT: CPT | Performed by: EMERGENCY MEDICINE

## 2023-01-01 PROCEDURE — 83986 ASSAY PH BODY FLUID NOS: CPT | Performed by: INTERNAL MEDICINE

## 2023-01-01 PROCEDURE — 99285 EMERGENCY DEPT VISIT HI MDM: CPT | Mod: 25

## 2023-01-01 PROCEDURE — 370N000003 HC ANESTHESIA WARD SERVICE

## 2023-01-01 PROCEDURE — 36569 INSJ PICC 5 YR+ W/O IMAGING: CPT

## 2023-01-01 PROCEDURE — 999N000253 HC STATISTIC WEANING TRIALS

## 2023-01-01 PROCEDURE — 84484 ASSAY OF TROPONIN QUANT: CPT | Performed by: EMERGENCY MEDICINE

## 2023-01-01 PROCEDURE — 0W993ZZ DRAINAGE OF RIGHT PLEURAL CAVITY, PERCUTANEOUS APPROACH: ICD-10-PCS | Performed by: INTERNAL MEDICINE

## 2023-01-01 RX ORDER — NALOXONE HYDROCHLORIDE 0.4 MG/ML
0.4 INJECTION, SOLUTION INTRAMUSCULAR; INTRAVENOUS; SUBCUTANEOUS
Status: DISCONTINUED | OUTPATIENT
Start: 2023-01-01 | End: 2023-01-06 | Stop reason: HOSPADM

## 2023-01-01 RX ORDER — POTASSIUM CHLORIDE 20MEQ/15ML
20 LIQUID (ML) ORAL ONCE
Status: COMPLETED | OUTPATIENT
Start: 2023-01-01 | End: 2023-01-01

## 2023-01-01 RX ORDER — MIRTAZAPINE 15 MG/1
15 TABLET, FILM COATED ORAL AT BEDTIME
COMMUNITY

## 2023-01-01 RX ORDER — NALOXONE HYDROCHLORIDE 0.4 MG/ML
0.2 INJECTION, SOLUTION INTRAMUSCULAR; INTRAVENOUS; SUBCUTANEOUS
Status: DISCONTINUED | OUTPATIENT
Start: 2023-01-01 | End: 2023-01-06 | Stop reason: HOSPADM

## 2023-01-01 RX ORDER — POTASSIUM CHLORIDE 29.8 MG/ML
20 INJECTION INTRAVENOUS
Status: COMPLETED | OUTPATIENT
Start: 2023-01-01 | End: 2023-01-01

## 2023-01-01 RX ORDER — ONDANSETRON 4 MG/1
4 TABLET, ORALLY DISINTEGRATING ORAL EVERY 6 HOURS PRN
Status: DISCONTINUED | OUTPATIENT
Start: 2023-01-01 | End: 2023-01-06 | Stop reason: HOSPADM

## 2023-01-01 RX ORDER — ACETAMINOPHEN 325 MG/1
650 TABLET ORAL EVERY 6 HOURS PRN
Status: DISCONTINUED | OUTPATIENT
Start: 2023-01-01 | End: 2023-01-06 | Stop reason: HOSPADM

## 2023-01-01 RX ORDER — BISACODYL 10 MG
10 SUPPOSITORY, RECTAL RECTAL DAILY PRN
Status: DISCONTINUED | OUTPATIENT
Start: 2023-01-01 | End: 2023-01-06 | Stop reason: HOSPADM

## 2023-01-01 RX ORDER — LIDOCAINE 40 MG/G
CREAM TOPICAL
Status: DISCONTINUED | OUTPATIENT
Start: 2023-01-01 | End: 2023-01-06 | Stop reason: HOSPADM

## 2023-01-01 RX ORDER — POLYETHYLENE GLYCOL 3350 17 G/17G
17 POWDER, FOR SOLUTION ORAL DAILY
Status: DISCONTINUED | OUTPATIENT
Start: 2023-01-01 | End: 2023-01-01

## 2023-01-01 RX ORDER — FUROSEMIDE 10 MG/ML
40 INJECTION INTRAMUSCULAR; INTRAVENOUS EVERY 12 HOURS
Status: DISCONTINUED | OUTPATIENT
Start: 2023-01-01 | End: 2023-01-01

## 2023-01-01 RX ORDER — PROCHLORPERAZINE 25 MG
12.5 SUPPOSITORY, RECTAL RECTAL EVERY 12 HOURS PRN
Status: DISCONTINUED | OUTPATIENT
Start: 2023-01-01 | End: 2023-01-06 | Stop reason: HOSPADM

## 2023-01-01 RX ORDER — ONDANSETRON 2 MG/ML
4 INJECTION INTRAMUSCULAR; INTRAVENOUS EVERY 6 HOURS PRN
Status: DISCONTINUED | OUTPATIENT
Start: 2023-01-01 | End: 2023-01-06 | Stop reason: HOSPADM

## 2023-01-01 RX ORDER — HYDROMORPHONE HCL IN WATER/PF 6 MG/30 ML
0.2 PATIENT CONTROLLED ANALGESIA SYRINGE INTRAVENOUS EVERY 30 MIN PRN
Status: DISCONTINUED | OUTPATIENT
Start: 2023-01-01 | End: 2023-01-06 | Stop reason: HOSPADM

## 2023-01-01 RX ORDER — FENTANYL CITRATE 50 UG/ML
100 INJECTION, SOLUTION INTRAMUSCULAR; INTRAVENOUS ONCE
Status: COMPLETED | OUTPATIENT
Start: 2023-01-01 | End: 2023-01-01

## 2023-01-01 RX ORDER — ENOXAPARIN SODIUM 100 MG/ML
40 INJECTION SUBCUTANEOUS EVERY 24 HOURS
Status: DISCONTINUED | OUTPATIENT
Start: 2023-01-01 | End: 2023-01-01

## 2023-01-01 RX ORDER — FUROSEMIDE 10 MG/ML
20 INJECTION INTRAMUSCULAR; INTRAVENOUS EVERY 12 HOURS
Status: DISCONTINUED | OUTPATIENT
Start: 2023-01-01 | End: 2023-01-01

## 2023-01-01 RX ORDER — DEXMEDETOMIDINE HYDROCHLORIDE 4 UG/ML
.1-1.2 INJECTION, SOLUTION INTRAVENOUS CONTINUOUS
Status: DISCONTINUED | OUTPATIENT
Start: 2023-01-01 | End: 2023-01-01

## 2023-01-01 RX ORDER — IOPAMIDOL 755 MG/ML
75 INJECTION, SOLUTION INTRAVASCULAR ONCE
Status: COMPLETED | OUTPATIENT
Start: 2023-01-01 | End: 2023-01-01

## 2023-01-01 RX ORDER — OXYBUTYNIN CHLORIDE 5 MG/1
5 TABLET ORAL DAILY
COMMUNITY

## 2023-01-01 RX ORDER — NICOTINE POLACRILEX 4 MG
15-30 LOZENGE BUCCAL
Status: DISCONTINUED | OUTPATIENT
Start: 2023-01-01 | End: 2023-01-06 | Stop reason: HOSPADM

## 2023-01-01 RX ORDER — NOREPINEPHRINE BITARTRATE 0.02 MG/ML
.01-.6 INJECTION, SOLUTION INTRAVENOUS CONTINUOUS
Status: DISCONTINUED | OUTPATIENT
Start: 2023-01-01 | End: 2023-01-01

## 2023-01-01 RX ORDER — HEPARIN SODIUM 5000 [USP'U]/.5ML
5000 INJECTION, SOLUTION INTRAVENOUS; SUBCUTANEOUS EVERY 12 HOURS
Status: DISCONTINUED | OUTPATIENT
Start: 2023-01-01 | End: 2023-01-01

## 2023-01-01 RX ORDER — FENTANYL CITRATE 50 UG/ML
25-50 INJECTION, SOLUTION INTRAMUSCULAR; INTRAVENOUS
Status: DISCONTINUED | OUTPATIENT
Start: 2023-01-01 | End: 2023-01-01

## 2023-01-01 RX ORDER — FUROSEMIDE 10 MG/ML
40 INJECTION INTRAMUSCULAR; INTRAVENOUS ONCE
Status: COMPLETED | OUTPATIENT
Start: 2023-01-01 | End: 2023-01-01

## 2023-01-01 RX ORDER — CARBOXYMETHYLCELLULOSE SODIUM 5 MG/ML
1 SOLUTION/ DROPS OPHTHALMIC
Status: DISCONTINUED | OUTPATIENT
Start: 2023-01-01 | End: 2023-01-06 | Stop reason: HOSPADM

## 2023-01-01 RX ORDER — PROCHLORPERAZINE MALEATE 5 MG
5 TABLET ORAL EVERY 6 HOURS PRN
Status: DISCONTINUED | OUTPATIENT
Start: 2023-01-01 | End: 2023-01-06 | Stop reason: HOSPADM

## 2023-01-01 RX ORDER — FENTANYL CITRATE 50 UG/ML
100 INJECTION, SOLUTION INTRAMUSCULAR; INTRAVENOUS ONCE
Status: DISCONTINUED | OUTPATIENT
Start: 2023-01-01 | End: 2023-01-01 | Stop reason: CLARIF

## 2023-01-01 RX ORDER — QUETIAPINE FUMARATE 25 MG/1
25 TABLET, FILM COATED ORAL 3 TIMES DAILY PRN
Status: DISCONTINUED | OUTPATIENT
Start: 2023-01-01 | End: 2023-01-01

## 2023-01-01 RX ORDER — MIDAZOLAM HCL IN 0.9 % NACL/PF 1 MG/ML
1-8 PLASTIC BAG, INJECTION (ML) INTRAVENOUS CONTINUOUS
Status: DISCONTINUED | OUTPATIENT
Start: 2023-01-01 | End: 2023-01-01

## 2023-01-01 RX ORDER — CHLORHEXIDINE GLUCONATE ORAL RINSE 1.2 MG/ML
15 SOLUTION DENTAL EVERY 12 HOURS
Status: DISCONTINUED | OUTPATIENT
Start: 2023-01-01 | End: 2023-01-06 | Stop reason: HOSPADM

## 2023-01-01 RX ORDER — LORAZEPAM 2 MG/ML
.5-1 INJECTION INTRAMUSCULAR EVERY 30 MIN PRN
Status: DISCONTINUED | OUTPATIENT
Start: 2023-01-01 | End: 2023-01-06 | Stop reason: HOSPADM

## 2023-01-01 RX ORDER — PROPOFOL 10 MG/ML
5-40 INJECTION, EMULSION INTRAVENOUS CONTINUOUS
Status: DISCONTINUED | OUTPATIENT
Start: 2023-01-01 | End: 2023-01-01

## 2023-01-01 RX ORDER — ATROPINE SULFATE 10 MG/ML
1 SOLUTION/ DROPS OPHTHALMIC
Status: DISCONTINUED | OUTPATIENT
Start: 2023-01-01 | End: 2023-01-06 | Stop reason: HOSPADM

## 2023-01-01 RX ORDER — LORAZEPAM 2 MG/ML
1 INJECTION INTRAMUSCULAR ONCE
Status: COMPLETED | OUTPATIENT
Start: 2023-01-01 | End: 2023-01-01

## 2023-01-01 RX ORDER — HYDROMORPHONE HCL IN WATER/PF 6 MG/30 ML
0.2 PATIENT CONTROLLED ANALGESIA SYRINGE INTRAVENOUS EVERY 10 MIN PRN
Status: DISCONTINUED | OUTPATIENT
Start: 2023-01-01 | End: 2023-01-06 | Stop reason: HOSPADM

## 2023-01-01 RX ORDER — ROPIVACAINE IN 0.9% SOD CHL/PF 0.1 %
.03-.125 PLASTIC BAG, INJECTION (ML) EPIDURAL CONTINUOUS
Status: DISCONTINUED | OUTPATIENT
Start: 2023-01-01 | End: 2023-01-01

## 2023-01-01 RX ORDER — ACETAMINOPHEN 650 MG/1
650 SUPPOSITORY RECTAL EVERY 6 HOURS PRN
Status: DISCONTINUED | OUTPATIENT
Start: 2023-01-01 | End: 2023-01-06 | Stop reason: HOSPADM

## 2023-01-01 RX ORDER — PROPOFOL 10 MG/ML
INJECTION, EMULSION INTRAVENOUS PRN
Status: DISCONTINUED | OUTPATIENT
Start: 2023-01-01 | End: 2023-01-01

## 2023-01-01 RX ORDER — POTASSIUM CHLORIDE 7.45 MG/ML
10 INJECTION INTRAVENOUS
Status: DISCONTINUED | OUTPATIENT
Start: 2023-01-01 | End: 2023-01-01

## 2023-01-01 RX ORDER — DEXTROSE MONOHYDRATE 25 G/50ML
25-50 INJECTION, SOLUTION INTRAVENOUS
Status: DISCONTINUED | OUTPATIENT
Start: 2023-01-01 | End: 2023-01-06 | Stop reason: HOSPADM

## 2023-01-01 RX ADMIN — POTASSIUM CHLORIDE 20 MEQ: 29.8 INJECTION, SOLUTION INTRAVENOUS at 06:21

## 2023-01-01 RX ADMIN — PHENYLEPHRINE HYDROCHLORIDE 100 MCG: 10 INJECTION INTRAVENOUS at 05:11

## 2023-01-01 RX ADMIN — CHLORHEXIDINE GLUCONATE 15 ML: 1.2 RINSE ORAL at 19:53

## 2023-01-01 RX ADMIN — LORAZEPAM 1 MG: 2 INJECTION INTRAMUSCULAR; INTRAVENOUS at 14:30

## 2023-01-01 RX ADMIN — PROPOFOL 10 MCG/KG/MIN: 10 INJECTION, EMULSION INTRAVENOUS at 11:25

## 2023-01-01 RX ADMIN — HYDROMORPHONE HYDROCHLORIDE 0.2 MG: 0.2 INJECTION, SOLUTION INTRAMUSCULAR; INTRAVENOUS; SUBCUTANEOUS at 19:05

## 2023-01-01 RX ADMIN — SODIUM CHLORIDE 500 ML: 9 INJECTION, SOLUTION INTRAVENOUS at 06:29

## 2023-01-01 RX ADMIN — Medication 40 MG: at 06:39

## 2023-01-01 RX ADMIN — INSULIN ASPART 1 UNITS: 100 INJECTION, SOLUTION INTRAVENOUS; SUBCUTANEOUS at 11:30

## 2023-01-01 RX ADMIN — FENTANYL CITRATE 50 MCG: 50 INJECTION INTRAMUSCULAR; INTRAVENOUS at 08:37

## 2023-01-01 RX ADMIN — FENTANYL CITRATE 50 MCG: 50 INJECTION INTRAMUSCULAR; INTRAVENOUS at 05:29

## 2023-01-01 RX ADMIN — FUROSEMIDE 40 MG: 10 INJECTION, SOLUTION INTRAVENOUS at 08:37

## 2023-01-01 RX ADMIN — CHLORHEXIDINE GLUCONATE 15 ML: 1.2 RINSE ORAL at 08:37

## 2023-01-01 RX ADMIN — IOPAMIDOL 75 ML: 755 INJECTION, SOLUTION INTRAVENOUS at 18:53

## 2023-01-01 RX ADMIN — Medication 40 MG: at 06:30

## 2023-01-01 RX ADMIN — INSULIN ASPART 1 UNITS: 100 INJECTION, SOLUTION INTRAVENOUS; SUBCUTANEOUS at 23:52

## 2023-01-01 RX ADMIN — HEPARIN SODIUM 5000 UNITS: 10000 INJECTION, SOLUTION INTRAVENOUS; SUBCUTANEOUS at 08:39

## 2023-01-01 RX ADMIN — HEPARIN SODIUM 5000 UNITS: 10000 INJECTION, SOLUTION INTRAVENOUS; SUBCUTANEOUS at 08:14

## 2023-01-01 RX ADMIN — Medication 25 MCG/HR: at 12:07

## 2023-01-01 RX ADMIN — DEXMEDETOMIDINE HYDROCHLORIDE 0.6 MCG/KG/HR: 400 INJECTION INTRAVENOUS at 17:41

## 2023-01-01 RX ADMIN — PANTOPRAZOLE SODIUM 40 MG: 40 INJECTION, POWDER, FOR SOLUTION INTRAVENOUS at 09:57

## 2023-01-01 RX ADMIN — HYDROMORPHONE HYDROCHLORIDE 0.2 MG: 0.2 INJECTION, SOLUTION INTRAMUSCULAR; INTRAVENOUS; SUBCUTANEOUS at 18:10

## 2023-01-01 RX ADMIN — INSULIN ASPART 1 UNITS: 100 INJECTION, SOLUTION INTRAVENOUS; SUBCUTANEOUS at 16:41

## 2023-01-01 RX ADMIN — GELATIN ABSORBABLE SPONGE SIZE 100 1 EACH: MISC at 12:22

## 2023-01-01 RX ADMIN — HEPARIN SODIUM 5000 UNITS: 10000 INJECTION, SOLUTION INTRAVENOUS; SUBCUTANEOUS at 20:06

## 2023-01-01 RX ADMIN — Medication 100 MCG: at 09:38

## 2023-01-01 RX ADMIN — FENTANYL CITRATE 100 MCG: 50 INJECTION, SOLUTION INTRAMUSCULAR; INTRAVENOUS at 14:21

## 2023-01-01 RX ADMIN — POLYETHYLENE GLYCOL 3350 17 G: 17 POWDER, FOR SOLUTION ORAL at 08:14

## 2023-01-01 RX ADMIN — HEPARIN SODIUM 5000 UNITS: 10000 INJECTION, SOLUTION INTRAVENOUS; SUBCUTANEOUS at 20:33

## 2023-01-01 RX ADMIN — CHLORHEXIDINE GLUCONATE 15 ML: 1.2 RINSE ORAL at 20:33

## 2023-01-01 RX ADMIN — DEXMEDETOMIDINE HYDROCHLORIDE 0.2 MCG/KG/HR: 400 INJECTION INTRAVENOUS at 08:28

## 2023-01-01 RX ADMIN — CHLORHEXIDINE GLUCONATE 15 ML: 1.2 RINSE ORAL at 08:12

## 2023-01-01 RX ADMIN — MIDAZOLAM HYDROCHLORIDE 1 MG/HR: 1 INJECTION, SOLUTION INTRAVENOUS at 05:24

## 2023-01-01 RX ADMIN — POTASSIUM CHLORIDE 20 MEQ: 29.8 INJECTION, SOLUTION INTRAVENOUS at 05:17

## 2023-01-01 RX ADMIN — LIDOCAINE HYDROCHLORIDE 1 ML: 10 INJECTION, SOLUTION EPIDURAL; INFILTRATION; INTRACAUDAL; PERINEURAL at 08:16

## 2023-01-01 RX ADMIN — PHENYLEPHRINE HYDROCHLORIDE 100 MCG: 10 INJECTION INTRAVENOUS at 05:10

## 2023-01-01 RX ADMIN — CHLORHEXIDINE GLUCONATE 15 ML: 1.2 RINSE ORAL at 09:57

## 2023-01-01 RX ADMIN — ACETAMINOPHEN 650 MG: 325 TABLET ORAL at 02:16

## 2023-01-01 RX ADMIN — PHENYLEPHRINE HYDROCHLORIDE 100 MCG: 10 INJECTION INTRAVENOUS at 05:12

## 2023-01-01 RX ADMIN — FUROSEMIDE 40 MG: 10 INJECTION, SOLUTION INTRAVENOUS at 09:57

## 2023-01-01 RX ADMIN — Medication 50 MCG: at 05:00

## 2023-01-01 RX ADMIN — FUROSEMIDE 20 MG: 10 INJECTION, SOLUTION INTRAMUSCULAR; INTRAVENOUS at 08:13

## 2023-01-01 RX ADMIN — PROPOFOL 30 MG: 10 INJECTION, EMULSION INTRAVENOUS at 05:10

## 2023-01-01 RX ADMIN — INSULIN ASPART 1 UNITS: 100 INJECTION, SOLUTION INTRAVENOUS; SUBCUTANEOUS at 04:46

## 2023-01-01 RX ADMIN — FUROSEMIDE 20 MG: 10 INJECTION, SOLUTION INTRAMUSCULAR; INTRAVENOUS at 19:53

## 2023-01-01 RX ADMIN — PHENYLEPHRINE HYDROCHLORIDE 100 MCG: 10 INJECTION INTRAVENOUS at 05:14

## 2023-01-01 RX ADMIN — FUROSEMIDE 40 MG: 10 INJECTION, SOLUTION INTRAVENOUS at 20:32

## 2023-01-01 RX ADMIN — SENNOSIDES 5 ML: 8.8 LIQUID ORAL at 20:06

## 2023-01-01 RX ADMIN — SENNOSIDES 5 ML: 8.8 LIQUID ORAL at 08:12

## 2023-01-01 RX ADMIN — PHENYLEPHRINE HYDROCHLORIDE 100 MCG: 10 INJECTION INTRAVENOUS at 05:15

## 2023-01-01 RX ADMIN — HYDROMORPHONE HYDROCHLORIDE 0.2 MG: 0.2 INJECTION, SOLUTION INTRAMUSCULAR; INTRAVENOUS; SUBCUTANEOUS at 17:51

## 2023-01-01 RX ADMIN — FUROSEMIDE 40 MG: 10 INJECTION, SOLUTION INTRAVENOUS at 19:29

## 2023-01-01 RX ADMIN — POTASSIUM CHLORIDE 20 MEQ: 20 SOLUTION ORAL at 05:54

## 2023-01-01 RX ADMIN — Medication 0.03 MCG/KG/MIN: at 05:40

## 2023-01-01 RX ADMIN — Medication 50 MCG: at 08:23

## 2023-01-01 RX ADMIN — DOCUSATE SODIUM 50 MG: 50 LIQUID ORAL at 20:06

## 2023-01-01 RX ADMIN — DEXMEDETOMIDINE HYDROCHLORIDE 0.6 MCG/KG/HR: 400 INJECTION INTRAVENOUS at 06:47

## 2023-01-01 RX ADMIN — LORAZEPAM 1 MG: 2 INJECTION INTRAMUSCULAR; INTRAVENOUS at 19:44

## 2023-01-01 RX ADMIN — SENNOSIDES 5 ML: 8.8 LIQUID ORAL at 12:07

## 2023-01-01 RX ADMIN — Medication 50 MCG: at 07:51

## 2023-01-01 RX ADMIN — DOCUSATE SODIUM: 50 LIQUID ORAL at 08:13

## 2023-01-01 RX ADMIN — DOCUSATE SODIUM 50 MG: 50 LIQUID ORAL at 12:07

## 2023-01-01 RX ADMIN — ACETAMINOPHEN 650 MG: 325 TABLET ORAL at 02:26

## 2023-01-01 RX ADMIN — INSULIN ASPART 1 UNITS: 100 INJECTION, SOLUTION INTRAVENOUS; SUBCUTANEOUS at 19:56

## 2023-01-01 RX ADMIN — POLYETHYLENE GLYCOL 3350 17 G: 17 POWDER, FOR SOLUTION ORAL at 12:07

## 2023-01-01 ASSESSMENT — ACTIVITIES OF DAILY LIVING (ADL)
ADLS_ACUITY_SCORE: 37
ADLS_ACUITY_SCORE: 41
ADLS_ACUITY_SCORE: 41
ADLS_ACUITY_SCORE: 35
ADLS_ACUITY_SCORE: 41
ADLS_ACUITY_SCORE: 37
ADLS_ACUITY_SCORE: 35
ADLS_ACUITY_SCORE: 37
ADLS_ACUITY_SCORE: 37
ADLS_ACUITY_SCORE: 33
ADLS_ACUITY_SCORE: 41
ADLS_ACUITY_SCORE: 41
EQUIPMENT_CURRENTLY_USED_AT_HOME: OTHER (SEE COMMENTS)
ADLS_ACUITY_SCORE: 37
ADLS_ACUITY_SCORE: 35
ADLS_ACUITY_SCORE: 35
ADLS_ACUITY_SCORE: 41
ADLS_ACUITY_SCORE: 37
ADLS_ACUITY_SCORE: 37
FALL_HISTORY_WITHIN_LAST_SIX_MONTHS: YES
ADLS_ACUITY_SCORE: 35
ADLS_ACUITY_SCORE: 35
DEPENDENT_IADLS:: CLEANING;SHOPPING;TRANSPORTATION
ADLS_ACUITY_SCORE: 37
DRESSING/BATHING_DIFFICULTY: OTHER (SEE COMMENTS)
ADLS_ACUITY_SCORE: 41
ADLS_ACUITY_SCORE: 39
WEAR_GLASSES_OR_BLIND: OTHER (SEE COMMENTS)
ADLS_ACUITY_SCORE: 37
ADLS_ACUITY_SCORE: 41
ADLS_ACUITY_SCORE: 41
ADLS_ACUITY_SCORE: 37
ADLS_ACUITY_SCORE: 41
ADLS_ACUITY_SCORE: 37
DIFFICULTY_EATING/SWALLOWING: OTHER (SEE COMMENTS)
ADLS_ACUITY_SCORE: 37
ADLS_ACUITY_SCORE: 37
ADLS_ACUITY_SCORE: 41
ADLS_ACUITY_SCORE: 35
WALKING_OR_CLIMBING_STAIRS_DIFFICULTY: OTHER (SEE COMMENTS)
ADLS_ACUITY_SCORE: 37
TOILETING_ISSUES: OTHER (SEE COMMENTS)
CONCENTRATING,_REMEMBERING_OR_MAKING_DECISIONS_DIFFICULTY: OTHER (SEE COMMENTS)
ADLS_ACUITY_SCORE: 35
ADLS_ACUITY_SCORE: 41
ADLS_ACUITY_SCORE: 35
ADLS_ACUITY_SCORE: 41
ADLS_ACUITY_SCORE: 37
DOING_ERRANDS_INDEPENDENTLY_DIFFICULTY: OTHER (SEE COMMENTS)
ADLS_ACUITY_SCORE: 35

## 2023-01-02 PROBLEM — R09.02 HYPOXIA: Status: ACTIVE | Noted: 2021-08-07

## 2023-01-02 PROBLEM — I31.39 PERICARDIAL EFFUSION: Status: ACTIVE | Noted: 2023-01-01

## 2023-01-02 PROBLEM — I50.21 ACUTE HFREF (HEART FAILURE WITH REDUCED EJECTION FRACTION) (H): Status: ACTIVE | Noted: 2023-01-01

## 2023-01-02 PROBLEM — J90 PLEURAL EFFUSION: Status: ACTIVE | Noted: 2023-01-01

## 2023-01-02 NOTE — ED PROVIDER NOTES
EMERGENCY DEPARTMENT ENCOUNTER      NAME: Shirley Phipps  AGE: 87 year old female  YOB: 1935  MRN: [id number]  EVALUATION DATE & TIME: 2023  4:50 PM    PCP: Desirae Thomson    ED PROVIDER: Luís White M.D.      Chief Complaint   Patient presents with     Shortness of Breath       FINAL IMPRESSION:  1. Acute HFrEF (heart failure with reduced ejection fraction) (H)    2. Pleural effusion    3. Pericardial effusion    4. Hypoxia          ED COURSE & MEDICAL DECISION MAKIN:19 PM I met with patient for initial interview and encounter. PPE worn includes N95 mask.  8:59 PM I spoke to hospitalist Dr. Paiz regarding plan for admission.    87 year old female presents to the Emergency Department for evaluation of shortness of breath.  She has bilateral lower extremity edema and is hypoxic in triage, not in any significant respiratory distress on supplemental oxygen here.  Her labs and imaging suggest hypervolemia.  She has bilateral pleural effusions and congestion.  BNP is elevated to 45,000.  EKG and troponin are reassuring against acute ischemia.  Nothing seems infectious in her presentation today with a stable white blood cell count, no fever, reassuring procalcitonin level.  Moderate pericardial effusion was noted on CT.  Patient is not tachycardic, hypotensive, or having any syncopal symptoms to raise concern for tamponade.  She was given a first dose of 40 mg IV Lasix here, will be admitted for continued management of new onset congestive heart failure.  Updated the patient and spoke with the admitting hospitalist      Medical Decision Making    History:    Supplemental history from: Documented in HPI, if applicable    External Record(s) reviewed: Documented in HPI, if applicable.    Work Up:    Chart documentation includes differential considered and any EKGs or imaging independently interpreted by provider.    In additional to work up documented, I considered the following work  up: See chart documentation, if applicable.    External consultation:    Discussion of management with another provider: See chart documentation, if applicable    Complicating factors:    Care impacted by chronic illness: Hyperlipidemia, Hypertension and Mental Health    Care affected by social determinants of health: N/A    Disposition considerations: Admit.            MEDICATIONS GIVEN IN THE EMERGENCY:  Medications   lidocaine 1 % 0.1-1 mL (has no administration in time range)   lidocaine (LMX4) cream (has no administration in time range)   sodium chloride (PF) 0.9% PF flush 3 mL (3 mLs Intracatheter Given 1/2/23 2228)   sodium chloride (PF) 0.9% PF flush 3 mL (has no administration in time range)   melatonin tablet 1 mg (has no administration in time range)   enoxaparin ANTICOAGULANT (LOVENOX) injection 40 mg (has no administration in time range)   acetaminophen (TYLENOL) tablet 650 mg (has no administration in time range)     Or   acetaminophen (TYLENOL) Suppository 650 mg (has no administration in time range)   ondansetron (ZOFRAN ODT) ODT tab 4 mg (has no administration in time range)     Or   ondansetron (ZOFRAN) injection 4 mg (has no administration in time range)   furosemide (LASIX) injection 40 mg (has no administration in time range)   iopamidol (ISOVUE-370) solution 75 mL (75 mLs Intravenous Given 1/2/23 1853)   furosemide (LASIX) injection 40 mg (40 mg Intravenous Given 1/2/23 1929)       NEW PRESCRIPTIONS STARTED AT TODAY'S ER VISIT  New Prescriptions    No medications on file          =================================================================    HPI    Patient information was obtained from: Patient     Use of : N/A         Shirley Phipps is a 87 year old female with a pertinent history of GERD, HLD, HTN, anxiety and depression, who presents to this ED via EMS for evaluation of shortness of breath.    Patient reports she has had a little over 3 days of dyspnea with exertion  which has gradually worsened to the point that it is nearly constant. She also reports she has noticed gradual onset of bilateral lower extremity swelling without pain over the past few weeks. She denies any history of similar swelling. Additionally reports a nonproductive cough and vague urinary symptoms which are giving her concern for a possible UTI. She denies any fever, chest pain, abdominal pain, or any other complaints. Patient currently lives independently at home.      REVIEW OF SYSTEMS   All systems reviewed and negative except as noted in HPI.    PAST MEDICAL HISTORY:  Past Medical History:   Diagnosis Date     Anemia      Aortic stenosis      Arthritis      Bicuspid aortic valve      Bladder infection      Cancer (H)     SQUAMOUS 2X     Closed burst fracture of lumbar vertebra (H) 03/26/2019    L4 BURST FX WITH RETROPULSION     Dyslipidemia      Fall 03/26/2019     Generalized anxiety disorder      GERD (gastroesophageal reflux disease)      History of transfusion     SHE DOESN'T THINK SHE HAD A TRANSFUSION REACTION     Hypertension        PAST SURGICAL HISTORY:  Past Surgical History:   Procedure Laterality Date     COMBINED CYSTOSCOPY, INSERT STENT URETER(S) Right 8/4/2021    Procedure: CYSTOURETEROSCOPY, RETROGRADE PYLEOGRAM RIGHT ;  Surgeon: Marbin Liu MD;  Location: [facility] OR     OTHER SURGICAL HISTORY Right     surgery for prior wrist fracture     OTHER SURGICAL HISTORY Right     surgery for prior knee fracture     [facility]  12/25/2016          ZZC RECONSTR TOTAL SHOULDER IMPLANT Left 12/26/2016    Procedure: LEFT REVERSE TOTAL SHOULDER ARTHROPLASTY;  Surgeon: Maurilio Marcelino MD;  Location: [facility];  Service: Orthopedics           CURRENT MEDICATIONS:    Current Facility-Administered Medications   Medication     acetaminophen (TYLENOL) tablet 650 mg    Or     acetaminophen (TYLENOL) Suppository 650 mg     enoxaparin ANTICOAGULANT (LOVENOX) injection 40 mg     furosemide (LASIX)  "injection 40 mg     lidocaine (LMX4) cream     lidocaine 1 % 0.1-1 mL     melatonin tablet 1 mg     ondansetron (ZOFRAN ODT) ODT tab 4 mg    Or     ondansetron (ZOFRAN) injection 4 mg     sodium chloride (PF) 0.9% PF flush 3 mL     sodium chloride (PF) 0.9% PF flush 3 mL     Current Outpatient Medications   Medication     acetaminophen (TYLENOL) 325 MG tablet     Calcium Carb-Cholecalciferol 500-125 MG-UNIT TABS     gabapentin (NEURONTIN) 300 MG capsule     IBUPROFEN PO     melatonin 5 MG tablet     mirtazapine (REMERON) 15 MG tablet     oxybutynin (DITROPAN) 5 MG tablet     Vitamin D3 (CHOLECALCIFEROL) 25 mcg (1000 units) tablet         ALLERGIES:  Allergies   Allergen Reactions     Allopurinol Other (See Comments)     Cefdinir Hives     Clindamycin Nausea     C.Diff     Codeine Nausea and Vomiting     Tolerates oral morphine.     Flagyl [Metronidazole] Angioedema     Sulfa (Sulfonamide Antibiotics) [Sulfa Drugs] Nausea     Canker sores     Tramadol Unknown     Upset stomach       FAMILY HISTORY:  Family History   Problem Relation Age of Onset     Diabetes Type 2  Other      Breast Cancer Mother      Diabetes Father      Diabetes Brother        SOCIAL HISTORY:   Social History     Socioeconomic History     Marital status:    Tobacco Use     Smoking status: Never     Smokeless tobacco: Never   Substance and Sexual Activity     Alcohol use: Yes     Alcohol/week: 7.0 standard drinks     Comment: Alcoholic Drinks/day: 1 shot of bee daily     Drug use: No       VITALS:  /60   Pulse 92   Temp 98.3  F (36.8  C)   Resp 22   Ht 1.6 m (5' 3\")   Wt 46.3 kg (102 lb 1.6 oz)   SpO2 98%   BMI 18.09 kg/m      PHYSICAL EXAM    Constitutional: Chronically ill-appearing elderly female patient, laying in bed, no acute distress  HENT: Normocephalic, Atraumatic. Neck Supple.  Eyes: EOMI, Conjunctiva normal.  Respiratory: Breathing is unlabored on supplemental oxygen via nasal cannula, lung sounds are diminished at " the bilateral bases particularly on the left with bilateral crackles.  Cardiovascular: Normal heart rate, Regular rhythm.  Bilateral lower extremity  Abdomen: Soft, nontender  Musculoskeletal: Good range of motion in all major joints. No major deformities noted.  Integument: Warm, Dry.  Neurologic: Alert & awake, Normal motor function, Normal sensory function, No focal deficits noted.   Psychiatric: Cooperative. Affect appropriate.     LAB:  All pertinent labs reviewed and interpreted.  Labs Ordered and Resulted from Time of ED Arrival to Time of ED Departure   CBC WITH PLATELETS - Abnormal       Result Value    WBC Count 8.8      RBC Count 3.92      Hemoglobin 11.7      Hematocrit 38.8      MCV 99      MCH 29.8      MCHC 30.2 (*)     RDW 16.0 (*)     Platelet Count 114 (*)    BASIC METABOLIC PANEL - Abnormal    Sodium 143      Potassium 4.1      Chloride 108 (*)     Carbon Dioxide (CO2) 27      Anion Gap 8      Urea Nitrogen 23.8 (*)     Creatinine 0.95      Calcium 9.9      Glucose 114 (*)     GFR Estimate 58 (*)    NT PROBNP INPATIENT - Abnormal    N terminal Pro BNP Inpatient 45,486 (*)    TROPONIN T, HIGH SENSITIVITY - Abnormal    Troponin T, High Sensitivity 62 (*)    ROUTINE UA WITH MICROSCOPIC REFLEX TO CULTURE - Abnormal    Color Urine Light Yellow      Appearance Urine Clear      Glucose Urine Negative      Bilirubin Urine Negative      Ketones Urine Negative      Specific Gravity Urine 1.025      Blood Urine 0.06 mg/dL (*)     pH Urine 5.5      Protein Albumin Urine 10 (*)     Urobilinogen Urine <2.0      Nitrite Urine Negative      Leukocyte Esterase Urine Negative      RBC Urine 8 (*)     WBC Urine 1      Squamous Epithelials Urine <1      Hyaline Casts Urine 1     D DIMER QUANTITATIVE - Abnormal    D-Dimer Quantitative 1.89 (*)    PROCALCITONIN - Abnormal    Procalcitonin 0.08 (*)    TROPONIN T, HIGH SENSITIVITY - Abnormal    Troponin T, High Sensitivity 74 (*)    BLOOD GAS VENOUS - Abnormal    pH  Venous 7.23 (*)     pCO2 Venous 71 (*)     pO2 Venous 44      Bicarbonate Venous 30      Base Excess/Deficit (+/-) 2.4      Oxyhemoglobin Venous 71.0      O2 Sat, Venous 72.5     INFLUENZA A/B & SARS-COV2 PCR MULTIPLEX - Normal    Influenza A PCR Negative      Influenza B PCR Negative      RSV PCR Negative      SARS CoV2 PCR Negative     HEPATIC FUNCTION PANEL - Normal    Protein Total 7.3      Albumin 4.6      Bilirubin Total 0.7      Alkaline Phosphatase 69      AST 22      ALT 23      Bilirubin Direct 0.21     TSH WITH FREE T4 REFLEX - Normal    TSH 0.39     INR - Normal    INR 1.15     BASIC METABOLIC PANEL   INR   BLOOD GAS VENOUS       RADIOLOGY:  Reviewed all pertinent imaging. Please see official radiology report.  CT Chest Pulmonary Embolism w Contrast   Final Result   IMPRESSION:   1.  No pulmonary embolus.   2.  Large right and moderate left pleural effusions.   3.  Moderate pericardial effusion.   4.  Dependent atelectasis.   5.  Progression of thoracic spine compression fractures.      Echocardiogram Complete    (Results Pending)       EKG:    Performed at: 1638    Impression:   Sinus rhythm  Right bundle branch block    Rate: 87  Rhythm: Sinus rhythm  Axis: 43  SC Interval: 174  QRS Interval: 120  QTc Interval: 454  ST Changes: Nonspecific T wave abnormality now evident in inferior leads  Comparison: When compared to ECG of 2/14/2022: Nonspecific T wave abnormality now evident in inferior leads    I have independently reviewed and interpreted the EKG(s) documented above.        I, Tu Rosenberg, am serving as a scribe to document services personally performed by Dr. Luís White, based on my observation and the provider's statements to me. I, Luís White MD attest that Tu Rosenberg is acting in a scribe capacity, has observed my performance of the services and has documented them in accordance with my direction.    Luís White M.D.  Emergency Medicine  [facility]  [facility] EMERGENCY DEPARTMENT  [address] [place] [id number]  [phone]  Dept: [phone]     Luís White MD  01/03/23 0107

## 2023-01-02 NOTE — ED NOTES
ED Provider In Triage Note  [facility]  Encounter Date: Jan 2, 2023    Chief Complaint   Patient presents with     Shortness of Breath       Brief HPI:   Shirley Phipps is a 87 year old female presenting to the Emergency Department with a chief complaint of SOB for three days, increasing LE edema. 88% on room air. got NTG, aspirin. per medics: feces on bed, no food in house. Lives alone.       Brief Physical Exam:  /68   Pulse 94   Temp 98.4  F (36.9  C)   Resp 18   SpO2 98%   General: cachectic   HEENT: Atraumatic  Resp: No respiratory distress. 98% on 2 L  Abdomen: Non-peritoneal  Neuro: Alert, oriented, answers questions appropriately  Psych: Behavior appropriate    Plan Initiated in Triage:  Ddx includes: chf, infection. Acs, failure to thrive    PIT Dispo:   Return to lobby while awaiting workup and ED bed availability    Derek Castañeda MD on 1/2/2023 at 4:24 PM    Patient was evaluated by the Physician in Triage due to a limitation of available rooms in the Emergency Department. A plan of care was discussed based on the information obtained on the initial evaluation and patient was consuled to return back to the Emergency Department lobby after this initial evalutaiton until results were obtained or a room became available in the Emergency Department. Patient was counseled not to leave prior to receiving the results of their workup.     Derek Castañeda MD  [facility] EMERGENCY DEPARTMENT  [address] [place] [id number]  [phone]     Derek Castañeda MD  01/02/23 2415

## 2023-01-02 NOTE — ED TRIAGE NOTES
Arrived via [facility] - c/o SOB x 3 days. Increased pedal edema. Unfit to live at home - feces on the bed and no food in the house. RA 88% - 325 ASA 0.4mg nitroglycerin. 98% 4lpm. 150/70. EKG WNL per paramedics. Pt winded with exertion. Alert and oriented.     PT as 99% on 4lpm so RN pt it to 2lpm and pt remained 98%.

## 2023-01-02 NOTE — ED NOTES
"ER NURSING STATUS NOTE  Shirley Phipps  MRN: [id number]       Initial reason for ER visit: shortness of breath, pt also has concerns for UTI    Current length of ER stay: 2 hours    Back story: ER visit on 1/2 for c/o SOB.      Medical Hx:   Past Medical History:   Diagnosis Date     Anemia      Aortic stenosis      Arthritis      Bicuspid aortic valve      Bladder infection      Cancer (H)     SQUAMOUS 2X     Closed burst fracture of lumbar vertebra (H) 03/26/2019    L4 BURST FX WITH RETROPULSION     Dyslipidemia      Fall 03/26/2019     GERD (gastroesophageal reflux disease)      History of transfusion     SHE DOESN'T THINK SHE HAD A TRANSFUSION REACTION     Hypertension         Current status:    Family at bedside: No  Neuro: A/Ox3 pt is forgetful at baseline   Cardiac: WNL  Respiratory: short of breath, no hx of COPD. Does not wear O2 at baseline   Digestive: WNL  Urinary: Pt denies urinary issues at this time but \"feels like a UTI maybe\"   Mobility: Pt is independent at home, requires SBA now   Skin: 1+ pitting edema to lower ext bilat   Abnormal labs/scans:   IV Access site: 18g R AC   Psych: WNL  Current living status: home independantly  VS obtained: low 02 while on RA, otherwise WNL  Pain status: denies   Preferred Language: English  Plan: Admitting to:      "

## 2023-01-03 NOTE — PLAN OF CARE
Problem: Mechanical Ventilation Invasive  Goal: Mechanical Ventilation Liberation  Outcome: Progressing  Goal: Absence of Ventilator-Induced Lung Injury  Outcome: Progressing    Sneha Lucio RT

## 2023-01-03 NOTE — PROGRESS NOTES
[facility]    Chart is reviewed, discussed with ICU team.  Patient was on BiPAP secondary to respiratory failure from acute CHF/pleural effusion.  Patient was unresponsive on BiPAP and proceeded to intubation afterwards.  Patient will probably remain intubated today and reevaluated tomorrow for possible extubation depending on response to diuresis.  Hospitalist service will follow peripherally for now      Ventura Henderson MD  [facility] Medicine Service  [phone]

## 2023-01-03 NOTE — PLAN OF CARE
[facility] - ICU    RN Progress Note:            Pertinent Assessments:      Please refer to flowsheet rows for full assessment     - Noted with left wrist dislocation vs fracture  - Bleeding from the right PICC line insertion site         Mobility Level:     2         Key Events - This Shift:     - WELL[place] Bernal was informed regarding the bone protruding at the left wrist, Xray was done. It was an old healed fracture  - Applied gel foam and pressure dressing at the PICC insertion site.  - Started tube feeding at 1600H  - Right thoracentesis was done and there was no noted bleeding at the insertion site. 750ml was taken out and samples were sent to laboratory by ultrasound staff who assisted the procedure              Plan:     - Monitor for bleeding at the PICC line. Titrate sedation according to goal.

## 2023-01-03 NOTE — PROGRESS NOTES
Brief ICU note    Called by hospitalist this morning to evaluate Pily.  87-year-old woman with severe untreated aortic stenosis admitted with respiratory failure in the setting of bilateral pleural effusion, heart failure.    She was started on BiPAP for mild acute on chronic respiratory acidosis but this morning was found to be unresponsive.    My exam she has mild asymmetric pupils, unclear of baseline.  Completely unresponsive to painful stimuli and flaccid limbs.    I spoke with her family was not aware that she was admitted to the hospital.  They confirmed that she is intubation okay with no CPR.  I explained that intubation would carry some risks for her heart, but given that she is completely unresponsive regardless of what blood gases or anything else shows, she meets criteria for intubation.    Will have anesthesia service intubate since she is admitted.  Emergency department staff updated.    Demetri Meyer MD

## 2023-01-03 NOTE — ED NOTES
Patient was intubated for airway protection, by CRNA.  ETT size 7.0 placed 22 at the teeth/gums, there were 1 attempts to intubated patient. BS post intubation bilaterol. Patient placed on ventilator on the following settings:  Vent Mode: CMV/AC  (Continuous Mandatory Ventilation/ Assist Control)  Resp Rate (Set): 14 breaths/min  Tidal Volume (Set, mL): 360 mL  PEEP (cm H2O): 5 cmH2O  Resp: 14      Kenneth H. Kjellberg

## 2023-01-03 NOTE — PROGRESS NOTES
RT PROGRESS NOTE    VENT DAY# 1    CURRENT SETTINGS:   Vent Mode: CMV/AC  (Continuous Mandatory Ventilation/ Assist Control)  FiO2 (%): 30 %  Resp Rate (Set): 16 breaths/min  Tidal Volume (Set, mL): 360 mL  PEEP (cm H2O): 5 cmH2O  Resp: (!) 8      PATIENT PARAMETERS:  PIP 20  Pplat:  17  Pmean:  8.4  Compliance: 30  SBT: No     Secretions:  scant amount via ETT  02 Sats:  100%  BS: clear/ diminished     ETT SIZE 7.0 Secured at 21 cm at teeth/gums    Respiratory Medications: none     NOTE / SHIFT SUMMARY:   Pt. remains on full vent support, settings above, RR increased to 16 per order. ETT repositioned Q2 for skin integrity, titrate oxygen as tolerated, Rt following      Sneha Lucio RT

## 2023-01-03 NOTE — ANESTHESIA PROCEDURE NOTES
Airway       Patient location during procedure: ED       Procedure Start/Stop Times: 1/3/2023 5:10 AM  Staff -        CRNA: Elena España APRN CRNA       Performed By: CRNA  Consent for Airway        Urgency: emergent       Consent: The procedure was performed in an emergent situation.  Indications and Patient Condition       Indications for airway management: airway protection, altered level of consciousness, respiratory insufficiency and hemodynamic instability       Mallampati: I     Induction type:RSI       Mask difficulty assessment: 0 - not attempted    Final Airway Details       Final airway type: endotracheal airway       Successful airway: Single subglottic suction  Endotracheal Airway Details        ETT size (mm): 7.0       Cuffed: yes       Successful intubation technique: video laryngoscopy       VL Blade Size: Glidescope 3       Grade View of Cords: 1       Adjucts: stylet       Position: Center       Measured from: lips       Secured at (cm): 21       Bite block used: None    Post intubation assessment        ETT secured, Vent settings by primary/ICU team, Primary/ICU team to review CXR, Sedation to be ordered by primary/ICU team and No apparent complications       Placement verified by: capnometry, equal breath sounds, chest rise and x-ray        Number of attempts at approach: 1       Number of other approaches attempted: 0       Secured with: commercial tube fernandez       Ease of procedure: easy       Dentition: Unchanged    Medication(s) Administered   Medication Administration Time: 1/3/2023 5:10 AM

## 2023-01-03 NOTE — CONSULTS
Care Management Initial Consult    General Information  Assessment completed with: Children, Son Jay  Type of CM/SW Visit: Initial Assessment    Primary Care Provider verified and updated as needed: Yes   Readmission within the last 30 days: no previous admission in last 30 days         Advance Care Planning: Advance Care Planning Reviewed: no concerns identified          Communication Assessment  Patient's communication style:  (unknown)    Hearing Difficulty or Deaf: other (see comments) (unknown)   Wear Glasses or Blind: other (see comments) (known)    Cognitive  Cognitive/Neuro/Behavioral: level of consciousness  Level of Consciousness: unresponsive                   Living Environment:   People in home: alone     Current living Arrangements: other (see comments) ([facility] level [facility])      Able to return to prior arrangements:  (Pending therapy recs)       Family/Social Support:  Care provided by: self, child(rabia) (Son helps with transport, cleaning, heavy lifiting.)  Provides care for: no one, unable/limited ability to care for self  Marital Status:   Children          Description of Support System: Involved, Supportive         Current Resources:   Patient receiving home care services: No     Community Resources: None  Equipment currently used at home: walker, standard  Supplies currently used at home:      Employment/Financial:  Employment Status: retired        Financial Concerns:             Lifestyle & Psychosocial Needs:  Social Determinants of Health     Tobacco Use: Low Risk      Smoking Tobacco Use: Never     Smokeless Tobacco Use: Never     Passive Exposure: Not on file   Alcohol Use: Not on file   Financial Resource Strain: Not on file   Food Insecurity: Not on file   Transportation Needs: Not on file   Physical Activity: Not on file   Stress: Not on file   Social Connections: Not on file   Intimate Partner Violence: Not on file   Depression: Not on file   Housing Stability: Not on file        Functional Status:  Prior to admission patient needed assistance:   Dependent ADLs:: Ambulation-walker  Dependent IADLs:: Cleaning, Shopping, Transportation       Mental Health Status:          Chemical Dependency Status:                Values/Beliefs:  Spiritual, Cultural Beliefs, Zoroastrianism Practices, Values that affect care:                 Additional Information:  Assessed. Spoke with son Jay. Pt lives in a one level town home alone. Ind with movement, and uses walker. Son Jay helps with transport, cleaning,and heavy lifting. Pt heats up microwave type meals for self. Son open to safest options for pt when it comes discharge plan. Jay can transport pending medical progression.       Pt currently intubated, with severe aortic stenosis. Further CM assessment will need to be completed when pt medically able.      CM will continue to follow plan of care, review recommendations, and assist with any discharge needs anticipated.       Jennie Gomez RN

## 2023-01-03 NOTE — PROGRESS NOTES
PROVIDER RESTRAINT FOR NON- VIOLENT BEHAVIOR FACE TO FACE EVALUATION    Time of Face to Face Evaluation (within one hour of initiation): 1516 on 1/3/2023     Patient's Immediate Situation:  Patient demonstrated the following behaviors: pulling at required tubes and drains     Patient's Reaction to the intervention:  Does patient understand the reason for restraint/seclusion? Unable to express    Medical Condition:  Is there any evidence of compromise of Skin integrity, Respiratory, Cardiovascular, Musculoskeletal, Hydration? no    Behavioral Condition:  In consultation with the RN, is there a need to continue this restraint or seclusion? Yes    See Restraint Flowsheet for complete restraint documentation and assessment.    Dolores Christianson, APRN CNP

## 2023-01-03 NOTE — PHARMACY-ADMISSION MEDICATION HISTORY
Pharmacy Note - Admission Medication History    Pertinent Provider Information: Patient is very confused about her medications. She only recognized gabapentin and told me she was taking ibuprofen and vitamin D. Patient has recent fill history for mirtazapine and oxybutynin but did not recognize the medications or indications. Unsure if she is taking them but added to the list at this time. Patient manages her own medications.      ______________________________________________________________________    Prior To Admission (PTA) med list completed and updated in EMR.       PTA Med List   Medication Sig Note Last Dose     acetaminophen (TYLENOL) 325 MG tablet Take 3 tablets (975 mg) by mouth 3 times daily (Patient taking differently: Take 975 mg by mouth every 8 hours as needed)  Unknown     Calcium Carb-Cholecalciferol 500-125 MG-UNIT TABS Take 1 tablet by mouth 2 times daily   Unknown     gabapentin (NEURONTIN) 300 MG capsule Take 300 mg by mouth 3 times daily  1/2/2023: Last filled 9/4/22 for 540 capsules Unknown     IBUPROFEN PO Take 400 mg by mouth every 8 hours as needed   Unknown     melatonin 5 MG tablet Take 5 mg by mouth nightly as needed for sleep  Unknown     mirtazapine (REMERON) 15 MG tablet Take 15 mg by mouth At Bedtime 1/2/2023: Fairly consistent fill history in 2022. Patient did not recognize name of medication or if she was taking it   Unknown     oxybutynin (DITROPAN) 5 MG tablet Take 5 mg by mouth daily 1/2/2023: Filled 10/11/22 for 90 tablets. Patient unsure if she is taking this medication  Unknown     Vitamin D3 (CHOLECALCIFEROL) 25 mcg (1000 units) tablet Take 1 tablet by mouth daily  Unknown       Information source(s): Patient, Clinic records and [facility]  Method of interview communication: in-person    Summary of Changes to PTA Med List  New: mirtazapine, oxybutynin  Discontinued: evenit, miralax, diclofenac gel, lidocaine patch  Changed: none    Patient was asked about  OTC/herbal products specifically.  PTA med list reflects this.    In the past week, patient estimated taking medication this percent of the time:  50-90% due to other.    Allergies were reviewed, assessed, and updated with the patient.      Patient does not use any multi-dose medications prior to admission.    The information provided in this note is only as accurate as the sources available at the time of the update(s).    Thank you for the opportunity to participate in the care of this patient.    Aniya Snell [facility]  1/2/2023 10:30 PM

## 2023-01-03 NOTE — ED NOTES
Placed patient on bipap 14/6,14,50%. Patients Vt are low along with low minute ventilation. I adjusted bipap still not achieving Vt goals. RT to monitor closely.

## 2023-01-03 NOTE — H&P
[facility]    History and Physical - Hospitalist Service       Date of Admission:  1/2/2023    Assessment & Plan      Shirley Phipps is admitted on 1/2/2023. She is a 86 y/o woman with PMH of severe aortic stenosis, hypertension, hyperlipidemia and GERD who presented with significant worsening shortness of breath with oxygen desaturation.  Stat investigations showed elevated BNP, slightly elevated troponin, CT chest pulmonary angiogram showed large right and moderate left pleural effusion with moderate pericardiac effusion.     She is being managed for acute decompensated CHF, severe aortic stenosis with bilateral pleural effusion for possible diagnostic/therapeutic thoracentesis versus conservative management.       # Acute Decompensated Heart failure   Complete serial cardiac enzymes to rule out possible ischemic injury  Continuous cardiac monitoring for arrhythmia  Stat dose of furosemide given for diuresis, will continue furosemide 40 mg twice daily to achieve euvolemia  Electrolyte monitoring and replacement  TTE to evaluate LVEF and valvular function  Oxygen therapy as needed to maintain adequate oxygen saturation  Routine cardiology consult please for further recommendations and possible cardiology intervention which results of TTE [cardiac catheterization and/ TAVR]  Possible CT surgery evaluation as per cardiology  Monitor for respiratory distress    # Hypoxic/ Hypercapnic Respiratory Failure + Bilateral Pleural Effusion + Moderate Pericardial Effusion  Continue oxygen therapy to maintain adequate oxygen saturation  NIPPV management initiated in view of elevated PCO2 level  Follow-up morning VBG  Diuresis as mentioned above for bilateral pleural effusion  N.p.o. + diagnostic/therapeutic thoracentesis scheduled for urgent improvement respiratory status pending diuresis management  Continue cardiac monitoring as stated above  TTE to further evaluate moderate pericardial confusion  + cardiology evaluation as above  Monitor for signs of respiratory distress  Monitor closely for signs of cardiac tamponade    #Severe aortic stenosis + hypertension  Management as stated above  Cardiology evaluation as stated above + possible CT surgery evaluation as per cardiology  Optimal blood pressure control, will resume PTA antihypertensive medication as blood pressure permits    #Hyperlipidemia + GERD  Resume other home medication as per comorbidities following medication reconciliation        Addendum [4:29 am]   Patient reassessed at bedside following short course of NIPPV management for acute respiratory failure with CO2 retention  She is unresponsive to both tactile and painful stimuli  BiPAP setting also noted not to be pulling adequate tidal volumes    Of note, CODE STATUS discussed with patient on initial evaluation and she would like to be intubated if necessary but no chest compression.  Case discussed with the intensivist for possible early intubation in view of obtunded state; intensivist to assess patient at bedside.   Stat intensivist consult placed.        Diet: NPO for Medical/Clinical Reasons Except for: Meds  Meant days progressively prophylaxis: Enoxaparin (Lovenox) SQ  Andres Catheter: Not present  Lines: None     Cardiac Monitoring: ACTIVE order. Indication: Acute decompensated heart failure (48 hours)  Code Status: No CPR- Pre-arrest intubation OK    Clinically Significant Risk Factors Present on Admission                # Thrombocytopenia: Lowest platelets = 114 in last 2 days, will monitor for bleeding     # Acute Respiratory Failure: based on blood gas results.  Continue supplemental oxygen as needed             Disposition Plan      Expected Discharge Date: 01/04/2023                  Massiel Paiz MD  Hospitalist Service  [facility]  Securely message with [employer] (more info)  Text page via [employer](ye[employer]      ______________________________________________________________________    Chief Complaint   Worsening shortness of breath + generalized body weakness.    History is obtained from the patient    History of Present Illness   Shirley Phipps is a 86 y/o woman with PMH of severe aortic stenosis, hypertension, hyperlipidemia and GERD who presented with significant worsening shortness of breath with oxygen desaturation.  Patient reported progressive shortness of breath that has been ongoing for several weeks.  Shortness of breath was initially present with exertion and activity however this progressively worsened over time to be present at rest with inability to perform basic daily activity.  She denies associated chest pain, palpitation, dizziness, weight gain but endorses increasing orthopnea and paroxysmal nocturnal dyspnea.  She denies fever, cough, nausea/vomiting, upper respiratory tract infection symptoms or symptoms suggestive of evolving infection.    Of note, patient has established diagnosis of severe aortic stenosis for which possible TAVR has been advised however patient was asymptomatic at that time and had not followed up to date.  She denies significant weight loss, night sweats, or other constitutional symptoms.  On EMS arrival, patient was hypoxic with oxygen saturation of 88% which improved with nasal cannula oxygen support.     Review of other system grossly at baseline.      Past Medical History    Past Medical History:   Diagnosis Date     Anemia      Aortic stenosis      Arthritis      Bicuspid aortic valve      Bladder infection      Cancer (H)     SQUAMOUS 2X     Closed burst fracture of lumbar vertebra (H) 03/26/2019    L4 BURST FX WITH RETROPULSION     Dyslipidemia      Fall 03/26/2019     Generalized anxiety disorder      GERD (gastroesophageal reflux disease)      History of transfusion     SHE DOESN'T THINK SHE HAD A TRANSFUSION REACTION     Hypertension        Past Surgical History    Past Surgical History:   Procedure Laterality Date     COMBINED CYSTOSCOPY, INSERT STENT URETER(S) Right 8/4/2021    Procedure: CYSTOURETEROSCOPY, RETROGRADE PYLEOGRAM RIGHT ;  Surgeon: Marbin Liu MD;  [place] [facility] [place]     OTHER SURGICAL HISTORY Right     surgery for prior wrist fracture     OTHER SURGICAL HISTORY Right     surgery for prior knee fracture     PICC  12/25/2016          ZZC RECONSTR TOTAL SHOULDER IMPLANT Left 12/26/2016    Procedure: LEFT REVERSE TOTAL SHOULDER ARTHROPLASTY;  Surgeon: Maurilio Marcelino MD;  [place] [facility] [place]  Service: Orthopedics       Prior to Admission Medications   Prior to Admission Medications   Prescriptions Last Dose Informant Patient Reported? Taking?   Calcium Carb-Cholecalciferol 500-125 MG-UNIT TABS Unknown  Yes Yes   Sig: Take 1 tablet by mouth 2 times daily    IBUPROFEN PO Unknown  Yes Yes   Sig: Take 400 mg by mouth every 8 hours as needed    Vitamin D3 (CHOLECALCIFEROL) 25 mcg (1000 units) tablet Unknown  Yes Yes   Sig: Take 1 tablet by mouth daily   acetaminophen (TYLENOL) 325 MG tablet Unknown  No Yes   Sig: Take 3 tablets (975 mg) by mouth 3 times daily   Patient taking differently: Take 975 mg by mouth every 8 hours as needed   gabapentin (NEURONTIN) 300 MG capsule Unknown  Yes Yes   Sig: Take 300 mg by mouth 3 times daily    melatonin 5 MG tablet Unknown  Yes Yes   Sig: Take 5 mg by mouth nightly as needed for sleep   mirtazapine (REMERON) 15 MG tablet Unknown  Yes Yes   Sig: Take 15 mg by mouth At Bedtime   oxybutynin (DITROPAN) 5 MG tablet Unknown  Yes Yes   Sig: Take 5 mg by mouth daily      Facility-Administered Medications: None        Review of Systems    The 10 point Review of Systems is negative other than noted in the HPI or here.      Physical Exam   Vital Signs: Temp: 98.3  F (36.8  C)   BP: 121/60 Pulse: 92   Resp: 22 SpO2: 98 % O2 Device: Nasal cannula Oxygen Delivery: 2 LPM  Weight: 102 lbs 1.6 oz    Constitutional:  Awake, alert and oriented, in appreciable respiratory distress with use of accessory muscles, anicteric, afebrile, acyanotic, underweight  Respiratory: Increased work of breathing, decreased breath sounds in both lower lung zones and extending to the medial right lung zone, scattered Rales in the upper lung zones bilaterally  Cardiovascular: S1-S2 heard with systolic murmur  GI: Soft, nontender, round, BS +  Skin: no bruising or bleeding and normal skin color, texture, turgor  Musculoskeletal: Bilateral pitting edema extending to the knees   Neurologic: Awake, alert, oriented to name, place and time.  Cranial nerves II-XII are grossly intact.  Motor strength weak likely secondary to generalized body weakness, sensation intact, no focal neurological deficit appreciated  Neuropsychiatric: General: normal, calm and normal eye contact  Level of consciousness: alert / normal  Affect: normal and pleasant    Medical Decision Making       70 MINUTES SPENT BY ME on the date of service doing chart review, history, exam, documentation & further activities per the note.      Data     I have personally reviewed the following data over the past 24 hrs:    8.8  \   11.7   / 114 (L)     143 108 (H) 23.8 (H) /  114 (H)   4.1 27 0.95 \       ALT: 23 AST: 22 AP: 69 TBILI: 0.7   ALB: 4.6 TOT PROTEIN: 7.3 LIPASE: N/A       Trop: 74 (H) BNP: 45,486 (H)       TSH: 0.39 T4: N/A A1C: N/A       Procal: 0.08 (H) CRP: N/A Lactic Acid: N/A       INR:  1.15 PTT:  N/A   D-dimer:  1.89 (H) Fibrinogen:  N/A       Imaging results reviewed over the past 24 hrs:   Recent Results (from the past 24 hour(s))   CT Chest Pulmonary Embolism w Contrast    Narrative    EXAM: CT CHEST PULMONARY EMBOLISM W CONTRAST  LOCATION: [facility]  DATE/TIME: 1/2/2023 7:06 PM    INDICATION: Dyspnea, leg swelling, elevated d dimer  COMPARISON: 2/9/2022  TECHNIQUE: CT chest pulmonary angiogram during arterial phase injection of IV contrast.  Multiplanar reformats and MIP reconstructions were performed. Dose reduction techniques were used.   CONTRAST: isovue 370 75ml    FINDINGS:  ANGIOGRAM CHEST: Pulmonary arteries are normal caliber and negative for pulmonary emboli. Thoracic aorta is negative for dissection. No CT evidence of right heart strain.    LUNGS AND PLEURA: Large right and moderate left pleural effusions. Dependent atelectasis. A few scattered stable pulmonary micronodules.    MEDIASTINUM/AXILLAE: Replaced aortic valve. Ascending aortic graft stable at 4.8 cm. Mitral annulus calcification. Moderate pericardial effusion measuring up to 2.2 cm in maximal thickness has increased from prior. No lymphadenopathy.    CORONARY ARTERY CALCIFICATION: Mild.    UPPER ABDOMEN: Atrophic left kidney. A simple left renal cyst, no follow-up required. Stable right nephrolithiasis.    MUSCULOSKELETAL: Degenerative changes of the spine. Severe osseous demineralization. Progression of compression fractures at T8 and T10. Multiple other stable compression fractures.      Impression    IMPRESSION:  1.  No pulmonary embolus.  2.  Large right and moderate left pleural effusions.  3.  Moderate pericardial effusion.  4.  Dependent atelectasis.  5.  Progression of thoracic spine compression fractures.

## 2023-01-03 NOTE — PROGRESS NOTES
"CLINICAL NUTRITION SERVICES - ASSESSMENT NOTE     Nutrition Prescription    RECOMMENDATIONS FOR MDs/PROVIDERS TO ORDER:    Malnutrition Status:    Unable to determine due to lack of data    Recommendations already ordered by Registered Dietitian (RD):  Jevity 1.5 Giuseppe @ goal of  40ml/hr  (960ml/day)  will provide: 1440 kcals, 61 g PRO, 729 ml free H20, 207 g CHO, and 20 g fiber daily.  Initiate at 20mL/hr and advance by 10mL/hr q 8 hrs as tolerated to goal rate  FWF: 120mL q 4 hrs to meet hydration needs    Future/Additional Recommendations:  Monitor TF tolerance and labs     REASON FOR ASSESSMENT  Shirley Phipps is a/an 87 year old female assessed by the dietitian for Provider Order - Registered Dietitian to Assess and Order TF per Medical Nutrition Therapy Protocol     NUTRITION HISTORY  Patient intubated, no propofol running  Unable to collect nutrition history d/t patient intubated  No BM noted in chart    From MD note: Acute respiratory failure, acute heart failure, HERLINDA d/t resp failure and diuresis    CURRENT NUTRITION ORDERS  Diet: NPO  Intake/Tolerance: NPO since admit    LABS  Labs reviewed - K 4.6, BUN 25.4(H), cre 1.21(H), Glu 131    MEDICATIONS  Medications reviewed - lasix, protonix, levophed    ANTHROPOMETRICS  Height: 160 cm (5' 2.992\")  Most Recent Weight: 44.9 kg (99 lb)    IBW: 50 kg  BMI: Underweight BMI <18.5  Weight History:   Wt Readings from Last 10 Encounters:   01/03/23 44.9 kg (99 lb)   02/15/22 46.3 kg (102 lb)   12/15/21 40.8 kg (90 lb)   09/24/21 40.8 kg (90 lb)   08/20/21 43.5 kg (96 lb)   08/08/21 43.6 kg (96 lb 1.6 oz)   08/01/21 40.4 kg (89 lb)   07/17/21 43.1 kg (95 lb)   08/01/19 44.9 kg (99 lb)   12/29/16 54.1 kg (119 lb 3.2 oz)   Dosing Weight: 50 kg IBW    ASSESSED NUTRITION NEEDS  Estimated Energy Needs: 6132-0161 kcals/day (25 - 30 kcals/kg)  Justification: Critical care guidelines  Estimated Protein Needs:  grams protein/day (1.2-2g/kg)  Justification: Critical care " guidelines  Estimated Fluid Needs:  (1 mL/kcal)   Justification: Maintenance    MALNUTRITION  % Intake: Unable to assess  % Weight Loss: Weight loss does not meet criteria  Subcutaneous Fat Loss: None observed  Muscle Loss: Temporal:  Moderate - may be appropriate with age, visual only. Covers were pulled up to chin and gear on face made it difficult to assess accurately.   Fluid Accumulation/Edema: None noted  Malnutrition Diagnosis: Unable to determine due to lack of data    NUTRITION DIAGNOSIS  Inadequate oral intake related to NPO diet as evidenced by mechanical ventilation and need for enteral nutrition    INTERVENTIONS  Implementation  Enteral Nutrition - Initiate   Jevity 1.5 Giuseppe @ goal of  40ml/hr  (960ml/day)  will provide: 1440 kcals, 61 g PRO, 729 ml free H20, 207 g CHO, and 20 g fiber daily.  Initiate at 20mL/hr and advance by 10mL/hr q 8 hrs as tolerated to goal rate  FWF: 120mL q 4 hrs to meet hydration needs    Goals  Tolerate TF  Meet estimated needs     Monitoring/Evaluation  Monitor TF tolerance and labs  Adriana Acosta RDN, LD

## 2023-01-03 NOTE — CONSULTS
[facility] [address], [place] [address]   [email or web address]   OFFICE: [phone]     CARDIOLOGY INPATIENT CONSULT NOTE     Impression and Plan     Assessment:  1. # Acute respiratory failure with hypoxia, requiring mechanical ventilation  2. # Severe symptomatic aortic valve stenosis and mild regurgitation  3. # Small pericardial effusion without evidence of tamponade physiology  4. # severely decompensated acute heart failure with preserved LVEF - due to severe aortic valve stenosis.  5. # Moderate-sized pleural effusion due to #2 above  6. # Acute kidney injury due to acute respiratory failure and diuresis  7. # Acute respiratory acidosis  8. # Leukocytosis with left shift    # denotes present on admission    Plan:    Continue diuresis.    Continue other supportive care with mechanical ventilation    Without addressing aortic valve stenosis, Pily is unlikely to have sustained improvement in her clinical status. Balloon valvuloplasty would be increased risk due to presence of mild aortic regurgitation. Will need ongoing discussions with family regarding goals of care. Prognosis will be determined by whether she responds to diuresis and can be extubated.    I personally viewed the echo images as they were being obtained by the echo technician.  She has grossly normal LV systolic function with severe aortic valve stenosis and mild regurgitation.  There is been an interval increase in the mean gradient to 52 mmHg    Discussed with CHERYL William with critical care medicine.    Primary Cardiologist: not previously established.    History of Present Illness      Ms. Shirley Phipps is a 87 year old female with known severe aortic valve stenosis who presented to the emergency department with significant worsening shortness of breath and hypoxia.  By review of admission H&P the shortness of breath was gradual and progressive in onset starting with  dyspnea on exertion later developed to orthopnea and PND.  She is dyspneic at rest on presentation.  After admission she decompensated further and is now intubated requiring mechanical ventilation.    Other than noted above, Ms. Phipps denies any chest pain/pressure/tightness, shortness of breath at rest or with exertion, light headedness/dizziness, pre-syncope, syncope, lower extremity swelling, palpitations, paroxysmal nocturnal dyspnea (PND), or orthopnea.          Review of Systems:  Further review of systems is otherwise negative/noncontributory (based on review of medical record (admission H&P) and 13 point review of systems reviewed. Pertinent positives noted).    Cardiac Diagnostics     ECG: Personally reviewed and interpreted: sinus rhythm RBBB    Telemetry (personally reviewed): sinus rhythm.    Most recent:  Echocardiogram (results reviewed):   TTE 8/7/21  Left ventricular size, wall motion and function are normal. The ejection fraction is 60-65%. There is moderate to severe concentric left ventricular hypertrophy. Diastolic Doppler findings (E/E' ratio and/or other parameters)  suggest left ventricular filling pressures are increased.  Normal right ventricle size and systolic function.  The left atrium is moderately dilated.  Severe valvular aortic stenosis.  The mean AoV pressure gradient is 44mmHg.  There is mild mitral stenosis.  There is mild to moderate (1-2+) mitral regurgitation.  There is mild to moderate (1-2+) tricuspid regurgitation.  The estimated pulmonary artery systolic pressure is 56 mmHg.  Small pericardial effusion  There are no echocardiographic indications of cardiac tamponade.  No previous study for comparison.        Medical History  Surgical History Family History Social History   Past Medical History:   Diagnosis Date     Anemia      Aortic stenosis      Arthritis      Bicuspid aortic valve      Bladder infection      Cancer (H)     SQUAMOUS 2X     Closed burst fracture of lumbar  vertebra (H) 03/26/2019    L4 BURST FX WITH RETROPULSION     Dyslipidemia      Fall 03/26/2019     Generalized anxiety disorder      GERD (gastroesophageal reflux disease)      History of transfusion     SHE DOESN'T THINK SHE HAD A TRANSFUSION REACTION     Hypertension      Past Surgical History:   Procedure Laterality Date     COMBINED CYSTOSCOPY, INSERT STENT URETER(S) Right 8/4/2021    Procedure: CYSTOURETEROSCOPY, RETROGRADE PYLEOGRAM RIGHT ;  Surgeon: Marbin Liu MD;  Location: [facility]     OTHER SURGICAL HISTORY Right     surgery for prior wrist fracture     OTHER SURGICAL HISTORY Right     surgery for prior knee fracture     PICC  12/25/2016          PICC TRIPLE LUMEN PLACEMENT  1/3/2023          ZZC RECONSTR TOTAL SHOULDER IMPLANT Left 12/26/2016    Procedure: LEFT REVERSE TOTAL SHOULDER ARTHROPLASTY;  Surgeon: Maurilio Marcelino MD;  [place] [facility] [place]  Service: Orthopedics     Family History   Problem Relation Age of Onset     Diabetes Type 2  Other      Breast Cancer Mother      Diabetes Father      Diabetes Brother            Social History     Socioeconomic History     Marital status:      Spouse name: Not on file     Number of children: Not on file     Years of education: Not on file     Highest education level: Not on file   Occupational History     Not on file   Tobacco Use     Smoking status: Never     Smokeless tobacco: Never   Substance and Sexual Activity     Alcohol use: Yes     Alcohol/week: 7.0 standard drinks     Comment: Alcoholic Drinks/day: 1 shot of bee daily     Drug use: No     Sexual activity: Not on file   Other Topics Concern     Parent/sibling w/ CABG, MI or angioplasty before 65F 55M? Not Asked   Social History Narrative     Not on file     Social Determinants of Health     Financial Resource Strain: Not on file   Food Insecurity: Not on file   Transportation Needs: Not on file   Physical Activity: Not on file   Stress: Not on file   Social  "Connections: Not on file   Intimate Partner Violence: Not on file   Housing Stability: Not on file             Physical Examination   VITALS: /56   Pulse 72   Temp (!) 77.5  F (25.3  C)   Resp 16   Ht 1.6 m (5' 3\")   Wt 46.3 kg (102 lb 1.6 oz)   SpO2 100%   BMI 18.09 kg/m    BMI: Body mass index is 18.09 kg/m .  Wt Readings from Last 3 Encounters:   01/02/23 46.3 kg (102 lb 1.6 oz)   02/15/22 46.3 kg (102 lb)   12/15/21 40.8 kg (90 lb)       Intake/Output Summary (Last 24 hours) at 1/3/2023 1022  Last data filed at 1/2/2023 2233  Gross per 24 hour   Intake --   Output 850 ml   Net -850 ml       General Appearance:  Intubated, sedated.   Head:  Normocephalic, without obvious abnormality   Eyes:  PERRL, conjunctiva/corneas clear, EOM's intact   Ears:  Grossly normal external ear canals bilaterally   Nose: Nares normal, septum midline, no drainage   Neck: Supple   Lungs:   Diminished breath sounds at bases.   Chest Wall:  No tenderness or deformity   Heart:  Late peaking systolic murmur occludes A2.   Abdomen:   Soft, non-tender   Extremities:  no edema   Skin: Skin color, texture, turgor normal, no rashes or lesions            Imaging      Chest x-ray  IMPRESSION: Endotracheal tube 1.5 cm above surekha and could be retracted. Bibasilar atelectasis or infiltrate and bilateral pleural effusions. Mitral annular calcification. Left shoulder arthroplasty. Degenerative change osseous structures. Remote left   rib fracture. Lower thoracic compression deformities.    CT chest   IMPRESSION:   1.  No pulmonary embolus.   2.  Large right and moderate left pleural effusions.   3.  Moderate pericardial effusion.   4.  Dependent atelectasis.   5.  Progression of thoracic spine compression fractures.     Lab Results    Chemistry/lipid CBC Cardiac Enzymes/BNP/TSH/INR   No results for input(s): CHOL, HDL, LDL, TRIG, CHOLHDLRATIO in the last 38875 hours.  No results for input(s): LDL in the last 50887 hours.  Recent Labs "   Lab Test 01/03/23  0716      POTASSIUM 4.6   CHLORIDE 110*   CO2 25   *   BUN 25.4*   CR 1.21*   GFRESTIMATED 43*   LEIF 9.4     Recent Labs   Lab Test 01/03/23  0716 01/02/23  1714 02/16/22  1153   CR 1.21* 0.95 0.87     No results for input(s): A1C in the last 07698 hours.       Recent Labs   Lab Test 01/03/23  0716   WBC 15.4*   HGB 10.6*   HCT 37.2   *   PLT 76*     Recent Labs   Lab Test 01/03/23  0716 01/02/23  1714 02/16/22  1043   HGB 10.6* 11.7 10.3*    Recent Labs   Lab Test 02/14/22  1722 08/07/21  1240 08/06/21  2254   TROPONINI 0.25 0.58* 0.64*     Recent Labs   Lab Test 01/02/23  1714 08/06/21  2254 02/11/21  0649   BNP  --  2,248* 3,542*   NTBNPI 45,486*  --   --      Recent Labs   Lab Test 01/02/23 1714   TSH 0.39     Recent Labs   Lab Test 01/02/23 1714   INR 1.15           Current Inpatient Scheduled Medications   Scheduled Meds:    chlorhexidine  15 mL Mouth/Throat Q12H     furosemide  40 mg Intravenous Q12H     heparin ANTICOAGULANT  5,000 Units Subcutaneous Q12H     pantoprazole  40 mg Per Feeding Tube QAM AC    Or     pantoprazole  40 mg Intravenous QAM AC     sodium chloride (PF)  3 mL Intracatheter Q8H     Continuous Infusions:    midazolam 1 mg/hr (01/03/23 0524)     - MEDICATION INSTRUCTIONS -       norepinephrine 0.03 mcg/kg/min (01/03/23 0631)     - MEDICATION INSTRUCTIONS -         No current outpatient medications on file.          Medications Prior to Admission   Prior to Admission medications    Medication Sig Start Date End Date Taking? Authorizing Provider   acetaminophen (TYLENOL) 325 MG tablet Take 3 tablets (975 mg) by mouth 3 times daily  Patient taking differently: Take 975 mg by mouth every 8 hours as needed 2/17/22  Yes Evelio Ag MD   Calcium Carb-Cholecalciferol 500-125 MG-UNIT TABS Take 1 tablet by mouth 2 times daily    Yes Reported, Patient   gabapentin (NEURONTIN) 300 MG capsule Take 300 mg by mouth 3 times daily    Yes Unknown, Entered By  History   IBUPROFEN PO Take 400 mg by mouth every 8 hours as needed    Yes Reported, Patient   melatonin 5 MG tablet Take 5 mg by mouth nightly as needed for sleep   Yes Unknown, Entered By History   mirtazapine (REMERON) 15 MG tablet Take 15 mg by mouth At Bedtime   Yes Unknown, Entered By History   oxybutynin (DITROPAN) 5 MG tablet Take 5 mg by mouth daily   Yes Unknown, Entered By History   Vitamin D3 (CHOLECALCIFEROL) 25 mcg (1000 units) tablet Take 1 tablet by mouth daily   Yes Unknown, Entered By History

## 2023-01-03 NOTE — PROCEDURES
"PICC Line Insertion Procedure Note  Pt. Name: Shirley Phipps  MRN:        [id number]    Procedure: Insertion of a  triple Lumen  5 fr  Bard SOLO (valved) Power PICC, Lot number [id number]    Indications: vasopressors    Contraindications : none    Procedure Details     Patient identified with 2 identifiers and \"Time Out\" conducted.  .     Central line insertion bundle followed: hand hygiene performed prior to procedure, site cleansed with cholraprep, hat, mask, sterile gloves, sterile gown worn, patient draped with maximum barrier head to toe drape, sterile field maintained.    The vein was assessed and found to be compressible and of adequate size.     Lidocaine 1% 1 ml administered sq to the insertion site. A 5 Fr PICC was inserted into the BRACHIAL vein of the left arm with ultrasound guidance. 1 attempt(s) required to access vein.   Catheter threaded without difficulty. Good blood return noted.    Modified Seldinger Technique used for insertion.    The 8 sharps that are included in the PICC insertion kit were accounted for and disposed of in the sharps container prior to breakdown of the sterile field.    Catheter secured with Statlock, biopatch and Tegaderm dressing applied.    Findings:    Total catheter length  33 cm, with 0 cm exposed. Mid upper arm circumference is 23 cm. Catheter was flushed with 30 cc NS. Patient  tolerated procedure well.    Tip placement verified by [employer] . Tip placement in the SVC.      CLABSI prevention brochure left at bedside.    Patient's primary RN notified PICC is ready for use.      Comments:        Angela Vogel RN PICC  [facility]        "

## 2023-01-03 NOTE — CONSULTS
Clinically Significant Risk Factors Present on Admission                # Thrombocytopenia: Lowest platelets = 114 in last 2 days, will monitor for bleeding     Intensive care consult    Assessment and plan: 87-year-old woman with a history of severe aortic stenosis, LVH admitted with respiratory failure in the setting of bilateral pleural effusions.  No CPR compressions but intubation okay.    Per hospitalist the patient was okay with intubation at the time of her admission.  This morning prior to intubation I spoke with her son who is the first listed contact.  He felt that her stated goals of care were consistent with what she would want.    Neuro: Obtunded at the time of my evaluation.  Anisocoria but could be due to previous cataract surgery.  VBG returned during my evaluation with severe acute on chronic respiratory acidosis and likely representing the cause of her encephalopathy.  We will still proceed with head CT.  She is on very low-dose sedation due to beginning to wake up after intubation.  -Stat head CT  - Serial neuro exams    Cardiovascular: Evidence of acute heart failure in the setting of elevated BNP, bilateral effusions.  Volume management is complicated by her diastolic heart failure and severe aortic stenosis.  Approximately 18 months ago valve area was 0.63 and the gradient was 44.    Pump: Mild hypotension in the setting of valve disease above, a small amount of meds used for induction.  Will initially resuscitate with fluid and low-dose vasopressor.  Evaluate once induction agents and sedation have completely worn off.  She will tolerate volume shifts and tachycardia poorly.  Echocardiogram this morning.    Coronaries: Mild troponin elevation.  Consistent with demand ischemia.  No obviously concerning changes on EKG just some mild ST changes.  Will continue to support underlying issue and monitor troponin.    Rhythm: No issues currently.  Monitor electrolytes closely to avoid  dysrhythmia.    Pulmonary: Acute hypoxic and hypercarbic respiratory failure and bilateral pleural effusions.  No known pulmonary disease but her blood gas does reflect some degree of chronic hypoventilation.  Her effusions are likely due to her heart failure.  Is unclear why she failed BiPAP.  On my assessment at the bedside she was not particularly synchronous but had estimated tidal volumes that typically would support a woman of her size.  No history of obstructive lung disease that I can see.  Possibly changes in chest wall mechanics due to pleural effusion led to increased work of breathing  - Intubation, mechanical ventilation and monitor.  -Consider therapeutic thoracentesis.  Mobilization of fluid from the pleural space but diuresis will be very slow.    Renal: Mild azotemia on metabolic panel.  High suspicion that her creatinine significantly overestimates her renal function.  At further risk due to the dye received for PE study.  We will aim for fluid even to mildly negative after she gets settled in and can readdress after echocardiogram.    Heme: Isolated thrombocytopenia.  We will have to review outside records to determine chronicity.    ID: No infectious prodrome.  Low procalcitonin and white count.  No plan for empiric therapy at this time.    GI: N.p.o. for now.    This patient is critically ill at high risk of decompensation and death.  90 minutes of critical care time thus far today.    HPI: 87-year-old woman with history of aortic stenosis presented to the ER with progressive shortness of breath for several weeks.  Severity is increased from dyspnea with exertion to now shortness of breath at rest.  Worsening orthopnea and shortness of breath at night.  No fever, cough, or infectious symptoms.    This history is collected from the chart.  I am unable to collect history from the patient.    Medications, history, allergies reviewed    Review of systems not possible due to patient factors    BP (!)  "69/36   Pulse 68   Temp 98.3  F (36.8  C)   Resp 14   Ht 1.6 m (5' 3\")   Wt 46.3 kg (102 lb 1.6 oz)   SpO2 100%   BMI 18.09 kg/m    Breathing agonally  Pupils constricted, right a few millimeters greater than left with abnormal shape suggestive of previous cataract surgery  Totally unresponsive to painful stimuli, flaccid extremities  Neck supple  Decreased breath sounds at the base bilaterally no wheezes  Regular rate and rhythm with systolic murmur  Abdomen soft  Extremities warm    Data reviewed including CT chest without PE, significant bilateral pleural effusions  Creatinine 0.95, N-terminal pro BNP 45,000, troponin mildly elevated  Venous blood gas with pH 7.23 PCO2 71, on follow-up 7.05 PCO2 95    "

## 2023-01-03 NOTE — PROGRESS NOTES
ICU PROGRESS NOTE:       87-year-old woman with a history of severe aortic stenosis, LVH admitted with respiratory failure in the setting of bilateral pleural effusions.  No CPR compressions but intubation okay. She was admitted last night due to decompensated heart failure, respiratory failure, and intubation for airway protection due to AMS.  Seen by my colleague, Dr. Meyer, early this morning.      Plan for Today:    1.  Continue diuresis with lasix.  US thoracentesis of bilateral pleural effusions.  I updated patient's son Jay about the risks.  I think patient will benefit from this procedure so that she can potentially extubate.    2.  PICC line placement.    3.  BC sent due to increased WBC ct.  Procal 0.08 on  1/2/2023.   UA negative.      4.  ECHO today.    5.  Possible placement of enteral tube and nutrition consult.  Not sure that she is going to be able to extubate today.    CHERYL William, CNP  Pulmonary Critical Care  Securely Message Through Vocera Web Console

## 2023-01-04 NOTE — PROGRESS NOTES
[facility]    Chart is reviewed, discussed with ICU team.    Patient was on BiPAP secondary to respiratory failure from acute CHF/pleural effusion.  Patient was unresponsive on BiPAP and proceeded to intubation afterwards.  Patient will probably remain intubated on 1/3/2023 and reevaluated tomorrow for possible extubation depending on response to diuresis.  Hospitalist service will follow peripherally for now       Ventura Henderson MD  [facility] Medicine Service  [phone]

## 2023-01-04 NOTE — CONSULTS
CLINICAL NUTRITION SERVICES - Brief NOTE      EVALUATION OF THE PROGRESS TOWARD GOALS   Diet: NPO  Nutrition Support: TF infusing at goal rate Jevity 1.5 at 40 ml/hr continuous.    ml every 4 hrs.    Intake: Jevity 1.5 Giuseppe @ goal of  40ml/hr  (960ml/day)  will provide: 1440 kcals, 61 g PRO, 729 ml free H20 + 720 ml flushes., 207 g CHO, 20 g fiber daily.       NEW FINDINGS   TF started yesterday.  Pt tolerating TF at goal rate.  I/Os -3.58L/ 24 hrs.   Pt on diuretic.   Labs reviewed:  Na 146, FSBG .  No BM documented this admission.      INTERVENTIONS  Implementation  Continue TF as ordered for patient's nutrition needs.  Increase FWF with noted hypernatremia.  135 ml every 4 hrs.  Watch fluid status as pt with severe aortic stenosis.    Noted MD decreasing diuretic also.   Check phosphorus level, this has not been checked this admission.      Monitoring/Evaluation  Progress toward goals will be monitored and evaluated per protocol.

## 2023-01-04 NOTE — PROGRESS NOTES
CHECK ONBASE FOR SCAN     Critical Care Progress Note  1/4/2023   2:15 PM    Admit Date: 1/2/2023  ICU Admission Day: 1/3/2023  Code Status: DNR      Problem List:   Principal Problem:    Hypoxia  Active Problems:    Pericardial effusion    Pleural effusion    Acute HFrEF (heart failure with reduced ejection fraction) (H)      Major changes for today:    - switch sedation to precedex   - SBT     Plan by System:     Neuro/Psych: Obtunded on ICU admission likely secondary to acute hypercapneic respiratory failure. Sedation for vent synchrony and comfort.    - Goal RASS 0 to -1    - Stop midazolam gtt and use Precedex.    - Fentanyl gtt and bolus dosing available.     Pulmonary: Acute hypoxic/hypercapneic respiratory failure with bilateral pleural effusions.    - Intubated 1/3 after failing BiPAP    - Failed SBT today     - daily attempts.    - Follow up CXR in AM; may consider therapeutic thora     CV: Severe symptomatic aortic stenosis; severely decompensated acute heart failure with preserved EF   - Historically has refused valve replacement. Agree with Dr. Wynne that she won't improve overall without intervention on her valve.    - Continue diuresis as she can tolerate.     GI/: some degree of malnutrition - unable to determine severity.    - Diet: start tube feeding    - Last BM: prior to admission     - Bowel meds: start bowel medications.     Renal: HERLINDA, hypernatremia, hypokalemia - all secondary to diuresis.    - Back off on diuresis a touch to 20mg IV BID.    - daily labs    - strict I/O     ID: no evidence of acute infection.    - Standard precautions.     Heme/Coag:  No acute issues     - DVT proph: heparin    Endocrine: hyperglycemia    - add sliding scale insulin coverage     Family: Sons are listed as emergency contact. I will reach out to them this afternoon.        Dispo: ICU    Jennifer Wright, CNP  [facility] Pulmonary/Critical Care    Clinically Significant Risk Factors         # Hypokalemia: Lowest K = 2.7 mmol/L  "in last 2 days, will replace as needed  # Hypernatremia: Highest Na = 146 mmol/L in last 2 days, will monitor as appropriate        # Thrombocytopenia: Lowest platelets = 76 in last 2 days, will monitor for bleeding         # Cachexia: Estimated body mass index is 16.43 kg/m  as calculated from the following:    Height as of this encounter: 1.6 m (5' 2.99\").    Weight as of this encounter: 42 kg (92 lb 11.2 oz)., PRESENT ON ADMISSION        Code Status: No CPR- Pre-arrest intubation OK        Total critical care time: 45-min  I have personally provided 45 minutes of critical care time exclusive of time spent on separately billable procedures for acute hypoxic/hypercapneic respiratory failure in setting of decompensated heart failure. High risk for acute deterioration and death.    _______________________________________________________________    HPI: 87-year-old woman with history of aortic stenosis presented to the ER with progressive shortness of breath for several weeks.  Severity is increased from dyspnea with exertion to now shortness of breath at rest.  Worsening orthopnea and shortness of breath at night.  No fever, cough, or infectious symptoms.    ROS: SACHA - intubated and sedated    Events over last 24-hours: no acute events overnight    Objective:     Vitals:    01/04/23 1230 01/04/23 1245 01/04/23 1300 01/04/23 1315   BP: 116/56 115/56 123/57 (!) 142/61   BP Location:       Pulse: 54 53 52 53   Resp: (!) 32 (!) 31 (!) 36 (!) 32   Temp: 97.5  F (36.4  C) 97.3  F (36.3  C) 97  F (36.1  C) (!) 96.6  F (35.9  C)   TempSrc:       SpO2: 98% 98% 97% 97%   Weight:       Height:           Vent:   Day of Intubation: 1/3  Ready to wean? Yes  Vent Mode: CMV/AC  (Continuous Mandatory Ventilation/ Assist Control)  FiO2 (%): 30 %  Resp Rate (Set): 16 breaths/min  Tidal Volume (Set, mL): 360 mL  PEEP (cm H2O): 5 cmH2O  Resp: (!) 32      Sedative Infusion: precedex and fentanyl   Sedation vacation: No    I/O: "     Intake/Output Summary (Last 24 hours) at 1/4/2023 1415  Last data filed at 1/4/2023 1200  Gross per 24 hour   Intake 1831.8 ml   Output 3175 ml   Net -1343.2 ml     Wt Readings from Last 3 Encounters:   01/04/23 42 kg (92 lb 11.2 oz)   02/15/22 46.3 kg (102 lb)   12/15/21 40.8 kg (90 lb)      Weight change: -1.406 kg (-3 lb 1.6 oz)    Physical Exam:  Gen: sedated  HEENMT: AT/NC  NEURO: sedated  CARDIOVASCULAR: RRR S1S2; harsh systolic mumur noted.   PULMONARY: unlabored on full vent. Lungs clear and equal.   GASTROINTESTINAL: soft ntnd  INTEGUMENT: visible skin intact  PSYCH: sedated    Labs:   Recent Labs   Lab 01/04/23  0413 01/03/23  0716 01/02/23  1714   *   < > 143   CO2 30*   < > 27   BUN 25.4*   < > 23.8*   ALKPHOS  --   --  69   ALT  --   --  23   AST  --   --  22    < > = values in this interval not displayed.       Recent Labs   Lab 01/04/23 0413   WBC 11.3*   HGB 10.2*   HCT 32.3*   PLT 90*       Micro:   Cultures without growth    Imaging: all imaging personalized reviewed   1/3 Thoracentesis - 750mL removed from right chest.     1/3 XR Abd - The nasogastric tube tip in proximal port project over the body of stomach. A temperature probe tip projects over the distal esophagus. Internal jugular line tip projects over the lower SVC.     Enlarged cardiac silhouette. Bilateral pleural effusions. Nonobstructive gas pattern. Severe right hip dysplasia. Lumbar spine compression fractures.    1/3 CXR - Endotracheal tube 1.5 cm above surekha and could be retracted. Bibasilar atelectasis or infiltrate and bilateral pleural effusions. Mitral annular calcification. Left shoulder arthroplasty. Degenerative change osseous structures. Remote left   rib fracture. Lower thoracic compression deformities.    Jennifer Wright, CNP  [facility] Pulmonary/Critical Care

## 2023-01-04 NOTE — PROGRESS NOTES
RT PROGRESS NOTE    VENT DAY# 2    CURRENT SETTINGS:   Vent Mode: CMV/AC  (Continuous Mandatory Ventilation/ Assist Control)  FiO2 (%): 30 %  Resp Rate (Set): 16 breaths/min  Tidal Volume (Set, mL): 360 mL  PEEP (cm H2O): 5 cmH2O  Resp: (!) 32      PATIENT PARAMETERS:  PIP 20  Pplat:  16  Pmean:  8  Compliance: 36  SBT: Yes      Wean time: <5 min      Failed wean due to: increased SOB  Secretions:  Small amount of tan secretions via ETT  02 Sats:  100%  BS: coarse/ diminished      ETT SIZE 7.0 Secured at 21 cm at teeth/gums    Respiratory Medications: none     NOTE / SHIFT SUMMARY:   Pt. remains on full vent support, settings above. BS coarse/ diminished, suctioning small amount of tan secretions via ETT. Pt. weaned on CPAP 5/ PS 5 for <5  min, RR 40, increased WOB. ETT repositioned Q2 for skin integrity, RT following      Sneha Lucio, RT

## 2023-01-04 NOTE — PROGRESS NOTES
Occupational Therapy  Patient currently intubated and sedated. Will discontinue OT order at this time.  Genesis Matute MOT, OTR/L, CLT 1/4/2023 , 2:12 PM

## 2023-01-04 NOTE — PLAN OF CARE
[facility] - ICU    RN Progress Note:            Pertinent Assessments:      Please refer to flowsheet rows for full assessment   Restless, agitated, not following commands, backing up the ventilator.  Bradycardic (49) after few hours of starting Precedex        Mobility Level:     2         Key Events - This Shift:     SJN SAT / SBT Record: Delete if N/A    SAT Safety Screen Failed   If FAILED why? Restless, agitated, not following commands, backing up the ventilator   SAT Performed {Failed SAT   If FAILED why?    SBT Safety Screen Failed SAT   If FAILED why?       Restless, agitated, not following commands, backing up the ventilator during weaning trials. Versed was stopped and Started Fentanyl infusion and titrated down Prcedex due to bradycardia.               Plan:     If heart rate goes down at 45, inform Intensivist. Titrate down Precedex according to RASS goal.

## 2023-01-04 NOTE — PROGRESS NOTES
PROVIDER RESTRAINT FOR NON-VIOLENT BEHAVIOR FACE TO FACE EVALUATION    Patient's Immediate Situation:  Patient demonstrated the following behaviors: impulsive; pulls at critical tubes and lines. Does not respond to redirection.     Patient's Reaction to the intervention:  Does patient understand the reason for restraint/seclusion? Unable      Medical Condition:  Is there any evidence of compromise of Skin integrity, Respiratory, Cardiovascular, Musculoskeletal, Hydration?   No    Behavioral Condition:  In consultation with the RN, is there a need to continue this restraint or seclusion? Yes    See Restraint Flowsheet for complete restraint documentation and assessment.    Jennifer Wright, CNP  [facility] Pulmonary/Critical Care

## 2023-01-04 NOTE — PROGRESS NOTES
Impression and Plan     Impression:   1. Acute respiratory failure with hypoxia, requiring mechanical ventilation  2. Severe symptomatic aortic valve stenosis and mild regurgitation  3. Small pericardial effusion without evidence of tamponade physiology  4. severely decompensated acute heart failure with preserved LVEF - due to severe aortic valve stenosis.  5. Moderate-sized pleural effusion due to #2 above  6. Acute kidney injury due to acute respiratory failure and diuresis  7. Acute respiratory acidosis  8. Leukocytosis with left shift, improving  9. Hypernatremia - likely from aggressive diuresis  10. Hypokalemia - likely from aggressive diuresis - will need to be repleted.    Plan:    Diuresis is limited by her severe AoS and now HERLINDA with hypernatremia.     Without addressing aortic valve stenosis, Pily is unlikely to have sustained improvement in her clinical status. Balloon valvuloplasty is high risk due to presence of moderate aortic regurgitation and current severe decompensation. I discussed her clinical condition with her son, Jay, who indicated that she has been very clear over the years that she does not want valve replacement surgery. She has been at peace and accepting that her aortic stenosis could likely be the cause of her death. We discussed that given her current clinical state that she is unlikely to have significant recovery from her current illness. Jay expressed that he will discuss these issues with his brother, who lives in [place], and decide how they want to proceed.    Discussed interventional management with Dr. Nava    Primary Cardiologist: not previously established    Subjective     Remains intubated and sedated.    Cardiac Diagnostics   Telemetry (personally reviewed): sinus rhythm. Rare bursts of atrial tachycardia    Echocardiogram (results reviewed): 1/3/23  Left ventricular function is normal.The ejection fraction is > 65%.   There is moderate concentric left  "ventricular hypertrophy.   Severe valvular aortic stenosis.   The ascending aorta is Mildly dilated.   Normal right ventricle size and systolic function.   There is mild to moderate (1-2+) aortic regurgitation.   Small pericardial effusion   Small left pleural effusion         Physical Examination       BP 95/54   Pulse 76   Temp 99.7  F (37.6  C)   Resp 16   Ht 1.6 m (5' 2.99\")   Wt 42 kg (92 lb 11.2 oz)   SpO2 99%   BMI 16.43 kg/m          Intake/Output Summary (Last 24 hours) at 1/4/2023 1008  Last data filed at 1/4/2023 0600  Gross per 24 hour   Intake 1291.3 ml   Output 3725 ml   Net -2433.7 ml       General: intubated, sedated.  HENT: ETT in place  Lungs: anterior lung fields mostly clear.  COR: normal rate, regular rhythm. 3/6 sysolic murmur with occluded A2.           Imaging      Ultrasound guided thoracentesis 1/3/23  PROCEDURE: Informed consent obtained. Time out performed. The chest was prepped and draped in sterile fashion. 8 mL of 1 % lidocaine was infused into the local soft tissues. Under direct ultrasound guidance, a 5 Polish catheter system was placed into the    pleural effusion.     0.75 liters of clear yellow fluid were removed and sent to lab, if requested.     Patient tolerated procedure well.     Ultrasound imaging was obtained and placed in the patient's permanent medical record.     Lab Results   Lab Results   Component Value Date    CHOL 155 03/16/2012    HDL 69 03/16/2012    TRIG 110 03/16/2012    BUN 25.4 (H) 01/04/2023     (H) 01/04/2023    CO2 30 (H) 01/04/2023       Lab Results   Component Value Date    WBC 11.3 (H) 01/04/2023    HGB 10.2 (L) 01/04/2023    HCT 32.3 (L) 01/04/2023    MCV 95 01/04/2023    PLT 90 (L) 01/04/2023       Lab Results   Component Value Date    TROPONINI 0.25 02/14/2022    BNP 2,248 (H) 08/06/2021    TSH 0.39 01/02/2023    INR 1.39 (H) 01/03/2023               Current Inpatient Scheduled Medications   Scheduled Meds:    chlorhexidine  15 mL " Mouth/Throat Q12H     furosemide  40 mg Intravenous Q12H     heparin ANTICOAGULANT  5,000 Units Subcutaneous Q12H     pantoprazole  40 mg Per Feeding Tube QAM AC    Or     pantoprazole  40 mg Intravenous QAM AC     sodium chloride (PF)  3 mL Intracatheter Q8H     Continuous Infusions:    dexmedetomidine 0.2 mcg/kg/hr (01/04/23 0828)     midazolam 3 mg/hr (01/04/23 0800)     - MEDICATION INSTRUCTIONS -       norepinephrine 0.01 mcg/kg/min (01/04/23 0800)     - MEDICATION INSTRUCTIONS -              Medications Prior to Admission   Prior to Admission medications    Medication Sig Start Date End Date Taking? Authorizing Provider   acetaminophen (TYLENOL) 325 MG tablet Take 3 tablets (975 mg) by mouth 3 times daily  Patient taking differently: Take 975 mg by mouth every 8 hours as needed 2/17/22  Yes Evelio Ag MD   Calcium Carb-Cholecalciferol 500-125 MG-UNIT TABS Take 1 tablet by mouth 2 times daily    Yes Reported, Patient   gabapentin (NEURONTIN) 300 MG capsule Take 300 mg by mouth 3 times daily    Yes Unknown, Entered By History   IBUPROFEN PO Take 400 mg by mouth every 8 hours as needed    Yes Reported, Patient   melatonin 5 MG tablet Take 5 mg by mouth nightly as needed for sleep   Yes Unknown, Entered By History   mirtazapine (REMERON) 15 MG tablet Take 15 mg by mouth At Bedtime   Yes Unknown, Entered By History   oxybutynin (DITROPAN) 5 MG tablet Take 5 mg by mouth daily   Yes Unknown, Entered By History   Vitamin D3 (CHOLECALCIFEROL) 25 mcg (1000 units) tablet Take 1 tablet by mouth daily   Yes Unknown, Entered By History

## 2023-01-04 NOTE — PROVIDER NOTIFICATION
01/04/23 0228   Ventilator - Patient    Patient Resp Rate  16 breaths/min   Expiratory Vt (ml) 364   Minute Volume (ml) 5.8 L/min   Peak Inspiratory Pressure (cm H2O) 17 cmH2O   Mean Airway Pressure (cm H2O) 8 cmH2O   Plateau Pressure (cm H2O) 14 cmH2O   Dynamic Compliance (mL/cm H2O) 36 mL/cm H2O   Airway Resistance 14.3   Auto/ Intrinsic PEEP (cm H2O) 0.5 cmH2O

## 2023-01-04 NOTE — PROGRESS NOTES
Vent Mode: CMV/AC  (Continuous Mandatory Ventilation/ Assist Control)  FiO2 (%): 30 %  Resp Rate (Set): 16 breaths/min  Tidal Volume (Set, mL): 360 mL  PEEP (cm H2O): 5 cmH2O  Resp: 13      VENT DAY: 2    PIP: 17  Mean: 8  Plat: 14  Secretions: moderate creamy/pink thick    ETT: 7.0 secured at 21 at the teeth/gums    Notes: pt stable overnight on mechanical ventilation.  BS coarse/dim.  Suctioned moderate amount of creamy/pink thick secretions.  No vent changes made.

## 2023-01-04 NOTE — PROGRESS NOTES
"Care Management Follow Up    Length of Stay (days): 2    Expected Discharge Date: 01/09/2023       Concerns to be Addressed:   ICU level of care, due to alteration in respiratory status, requiring oral intubation/mechanical ventilation and drips.    Patient plan of care discussed at interdisciplinary rounds: Yes    Anticipated Discharge Disposition:  Discharge goal is pending response to treatment, medical needs and mobility closer to discharge.      Anticipated Discharge Services:  Continued therapy, skilled nursing and medical management.   Anticipated Discharge DME:  Per therapy (if indicated).    Patient/family educated on Medicare website which has current facility and service quality ratings:   NA  Education Provided on the Discharge Plan:   Per team  Patient/Family in Agreement with the Plan:   Yes    Referrals Placed by CM/SW:   None  Private pay costs discussed: Not applicable     Additional Information:  Previous CM spoke with son Jay. Patient lives in a one level town home alone and is independent with mobility and uses walker. Her son Jay helps with transport, cleaning and heavy lifting. Patient usually heats up microwavable meals for herself. Jay us open to the \"safest options\" for patient when it comes discharge plan. Jay can transport pending medical needs and mobility.     Debora Howard RN      "

## 2023-01-04 NOTE — PLAN OF CARE
[facility] - ICU    RN Progress Note:            Pertinent Assessments:      Please refer to flowsheet rows for full assessment     Patient required small dose of levophed this shift to support blood pressures. Low grade fever noted. Patient not following commands but will move all extremities. Patient tolerates vent this shift. Small amounts of secretions noted. Patient tolerating tube feedings. Potassium infusing per protocol.         Mobility Level:     1         Key Events - This Shift:                   Plan:     Evaluate ability to wean and respiratory status

## 2023-01-05 NOTE — PLAN OF CARE
[facility] - ICU    RN Progress Note:            Pertinent Assessments:      Please refer to flowsheet rows for full assessment     200cc UOP following administration of IV lasix. Patient became agitated with stimulation but calmed easily without need for PRN medication. Levophed restarted and titrated for MAP<65         Mobility Level:     2         Key Events - This Shift:     SJN SAT / SBT Record: Delete if N/A    SAT Safety Screen Failed   If FAILED why? aggitation   SAT Performed No   If FAILED why? Aggitation    SBT Safety Screen    If FAILED why?       SBT attempted on day shift. Patient becomes agitated when stimulated while on sedation              Plan:     Continue with current plan of care overnight.          Point of Contact Update No  If No, reason: Patient's son was updated on day shift

## 2023-01-05 NOTE — PLAN OF CARE
Problem: Plan of Care - These are the overarching goals to be used throughout the patient stay.    Goal: Optimal Comfort and Wellbeing  Outcome: Progressing  Intervention: Monitor Pain and Promote Comfort  Recent Flowsheet Documentation  Taken 1/5/2023 0900 by Anya Albert RN  Pain Management Interventions: medication (see MAR)  Taken 1/5/2023 0845 by Anya Albert RN  Pain Management Interventions: medication (see MAR)  Taken 1/5/2023 0830 by Anya Albert RN  Pain Management Interventions: medication (see MAR)  Taken 1/5/2023 0800 by Anya Albert RN  Pain Management Interventions: medication (see MAR)   Goal Outcome Evaluation:       [facility] - ICU    RN Progress Note:            Pertinent Assessments:      Please refer to flowsheet rows for full assessment     Pt has moved to comfort care          Mobility Level:     bedrest         Key Events - This Shift:     SJN SAT / SBT Record: Delete if N/A                               Pt has severe aortic stenosis, see icu note           Plan:     Move to comfort care patient extubated at 225.  See mar for meds         Point of Contact Update  pts sons are here all cares explained questions answered , supporttive to pt .

## 2023-01-05 NOTE — PROGRESS NOTES
[facility]     Chart is reviewed, discussed with ICU team.     Patient was on BiPAP secondary to respiratory failure from acute CHF/pleural effusion.  Patient was unresponsive on BiPAP and proceeded to intubation afterwards.  Patient will probably remain intubated on 1/3/2023 and reevaluated for possible extubation depending on response to diuresis.  Hospitalist service will follow peripherally for now

## 2023-01-05 NOTE — PROGRESS NOTES
SPIRITUAL HEALTH SERVICES Progress Note    Saw pt Shirley Phipps per consult order as patient nears end of life. Pily's two sons, Jay and Trevor, present as they shared family/life history. They welcomed a prayer of blessing and reading of Psalm 23 and were appreciative to learn that Pily had received Taoist sacrament of anointing on 1/3. They are supporting each other and have Cj music playing, since Pily really enjoyed his music.     Plan of Care - A  will continue to visit as able or per request by patient/family/staff.      Aime Morgan MDiv, [facility]  /Manager Spiritual Health Services  [phone]

## 2023-01-05 NOTE — PROGRESS NOTES
Critical Care Update  1/5/2023   2:23 PM     Yulissa Thompson and Trevor at bedside. Updated on overall outlook which is pretty grim. Reiterated discussion from earlier today that we have reached the limit of what we are able to offer Pily. Unable push diuresis much further due to rising sodium and creatinine. Difficult to wean due to agitation.   They are both in agreement that she would not want ongoing aggressive measures. They state she has always been aware that her valve disease would eventually be her demise. They are in agreement with transitioning to comfort care and request compassionate extubation.     Comfort orders placed.   Premedicate with fentanyl and lorazepam.   OK to keep precedex running; discontinue propofol and fentanyl.     Jennifer Wright, CNP  [facility] Pulmonary/Critical Care

## 2023-01-05 NOTE — PROGRESS NOTES
Vent Mode: CMV/AC  (Continuous Mandatory Ventilation/ Assist Control)  FiO2 (%): 30 %  Resp Rate (Set): 16 breaths/min  Tidal Volume (Set, mL): 360 mL  PEEP (cm H2O): 5 cmH2O  Resp: 16      VENT DAY: 3    PIP: 18  Mean: 8  Plat: 16  Secretions: scant    ETT: 7.0 secured at 21 at the teeth/gums    Note: pt stable overnight on mechanical ventilation.  No vent changes made.  BS diminished.

## 2023-01-05 NOTE — PROGRESS NOTES
Impression and Plan     Impression:   1. Acute respiratory failure with hypoxia, requiring mechanical ventilation  2. Severe symptomatic aortic valve stenosis and mild regurgitation  3. Small pericardial effusion without evidence of tamponade physiology  4. severely decompensated acute heart failure with preserved LVEF - due to severe aortic valve stenosis.  5. Moderate-sized pleural effusion due to #2 above  6. Acute kidney injury due to acute respiratory failure and diuresis  7. Acute respiratory acidosis  8. Leukocytosis with left shift, improving  9. Hypernatremia - likely from aggressive diuresis  10. Hypokalemia - likely from aggressive diuresis - will need to be repleted.  11. Shock - likely cardiogenic from severe aortic stenosis and diuresis. NE now at 0.04 mcg/kg/min    Plan:    Diuresis is limited by her severe AoS and now HERLINDA with hypernatremia.     Without addressing aortic valve stenosis, Pily is unlikely to have sustained improvement in her clinical status. Balloon valvuloplasty is high risk due to presence of moderate aortic regurgitation and current severe decompensation.     If she is able to be extubated recommend palliative care involvement and consideration of hospice.     Reviewed BMP, CBC, chest x-ray    Discussed with CHEYRL Schaeffer with critical care medicine.    Primary Cardiologist: not previously established    Subjective     Remains intubated. Alert but agitated. On vent wean trial.    Cardiac Diagnostics   Telemetry (personally reviewed): sinus rhythm.     Echocardiogram (results reviewed): 1/3/23  Left ventricular function is normal.The ejection fraction is > 65%.   There is moderate concentric left ventricular hypertrophy.   Severe valvular aortic stenosis.   The ascending aorta is Mildly dilated.   Normal right ventricle size and systolic function.   There is mild to moderate (1-2+) aortic regurgitation.   Small pericardial effusion   Small left pleural effusion  "        Physical Examination       /55 (BP Location: Left arm)   Pulse 83   Temp 99.3  F (37.4  C)   Resp 21   Ht 1.6 m (5' 2.99\")   Wt 42 kg (92 lb 11.2 oz)   SpO2 95%   BMI 16.43 kg/m          Intake/Output Summary (Last 24 hours) at 1/4/2023 1008  Last data filed at 1/4/2023 0600  Gross per 24 hour   Intake 1291.3 ml   Output 3725 ml   Net -2433.7 ml       General: intubated, sedated.  HENT: ETT in place  Lungs: anterior lung fields mostly clear.  COR: normal rate, regular rhythm. 3/6 sysolic murmur with occluded A2.           Imaging      Chest x-ray:   IMPRESSION: Endotracheal tube tip is less than 1 cm from the surekha. Right-sided PICC tip in good position at cavoatrial junction. 2 enteric tubes traverse the esophagus and extend below diaphragm. Heart size magnified in AP projection. Decrease in right  pleural effusion with improved aeration right lung base. Moderate left effusion and left basilar consolidation overall similar. No pneumothorax.     Report was called to the ICU at 0940 hours    Ultrasound guided thoracentesis 1/3/23  PROCEDURE: Informed consent obtained. Time out performed. The chest was prepped and draped in sterile fashion. 8 mL of 1 % lidocaine was infused into the local soft tissues. Under direct ultrasound guidance, a 5 [place] catheter system was placed into the    pleural effusion.     0.75 liters of clear yellow fluid were removed and sent to lab, if requested.     Patient tolerated procedure well.     Ultrasound imaging was obtained and placed in the patient's permanent medical record.     Lab Results   Lab Results   Component Value Date    CHOL 155 03/16/2012    HDL 69 03/16/2012    TRIG 110 03/16/2012    BUN 27.2 (H) 01/05/2023     (H) 01/05/2023    CO2 34 (H) 01/05/2023       Lab Results   Component Value Date    WBC 12.7 (H) 01/05/2023    HGB 10.8 (L) 01/05/2023    HCT 34.3 (L) 01/05/2023    MCV 94 01/05/2023    PLT 94 (L) 01/05/2023       Lab Results   Component " Value Date    TROPONINI 0.25 02/14/2022    BNP 2,248 (H) 08/06/2021    TSH 0.39 01/02/2023    INR 1.39 (H) 01/03/2023               Current Inpatient Scheduled Medications   Scheduled Meds:    chlorhexidine  15 mL Mouth/Throat Q12H     docusate  50 mg Oral BID     fentaNYL  100 mcg Intravenous Once     furosemide  20 mg Intravenous Q12H     heparin ANTICOAGULANT  5,000 Units Subcutaneous Q12H     insulin aspart  1-6 Units Subcutaneous Q4H     pantoprazole  40 mg Per Feeding Tube QAM AC    Or     pantoprazole  40 mg Intravenous QAM AC     polyethylene glycol  17 g Oral Daily     sennosides  5 mL Oral BID     sodium chloride (PF)  3 mL Intracatheter Q8H     Continuous Infusions:    dexmedetomidine 1.2 mcg/kg/hr (01/05/23 0807)     fentaNYL 100 mcg/hr (01/05/23 0946)     norepinephrine 0.04 mcg/kg/min (01/05/23 0742)     - MEDICATION INSTRUCTIONS -              Medications Prior to Admission   Prior to Admission medications    Medication Sig Start Date End Date Taking? Authorizing Provider   acetaminophen (TYLENOL) 325 MG tablet Take 3 tablets (975 mg) by mouth 3 times daily  Patient taking differently: Take 975 mg by mouth every 8 hours as needed 2/17/22  Yes Evelio Ag MD   Calcium Carb-Cholecalciferol 500-125 MG-UNIT TABS Take 1 tablet by mouth 2 times daily    Yes Reported, Patient   gabapentin (NEURONTIN) 300 MG capsule Take 300 mg by mouth 3 times daily    Yes Unknown, Entered By History   IBUPROFEN PO Take 400 mg by mouth every 8 hours as needed    Yes Reported, Patient   melatonin 5 MG tablet Take 5 mg by mouth nightly as needed for sleep   Yes Unknown, Entered By History   mirtazapine (REMERON) 15 MG tablet Take 15 mg by mouth At Bedtime   Yes Unknown, Entered By History   oxybutynin (DITROPAN) 5 MG tablet Take 5 mg by mouth daily   Yes Unknown, Entered By History   Vitamin D3 (CHOLECALCIFEROL) 25 mcg (1000 units) tablet Take 1 tablet by mouth daily   Yes Unknown, Entered By History

## 2023-01-05 NOTE — PROGRESS NOTES
RT PROGRESS NOTE    VENT DAY# 3    CURRENT SETTINGS:   Vent Mode: CMV/AC  (Continuous Mandatory Ventilation/ Assist Control)  FiO2 (%): 30 %  Resp Rate (Set): 16 breaths/min  Tidal Volume (Set, mL): 360 mL  PEEP (cm H2O): 5 cmH2O  Resp: 16      PATIENT PARAMETERS:  PIP 23  Pplat:  20  Pmean:  9.1  Compliance: 22  SBT: Yes      Wean time: <5min      Failed wean due to: agitation/unresolved anxiety  Secretions:  Moderate amount of tan secretions via ETT  02 Sats:  100%  BS: coarse/ diminished     ETT SIZE 7.0 Secured at 19 cm at teeth/gums    NOTE / SHIFT SUMMARY:   Pt. remains on full vent support, settings above. ETT pulled back to 19 cm @ teeth per order. ETT repositioned Q2 for skin integrity, RT following      Sneha Lucio, RT

## 2023-01-05 NOTE — PROVIDER NOTIFICATION
01/04/23 1941   Ventilator - Patient    Patient Resp Rate  16 breaths/min   Expiratory Vt (ml) 358   Minute Volume (ml) 5.6 L/min   Peak Inspiratory Pressure (cm H2O) 17 cmH2O   Mean Airway Pressure (cm H2O) 8 cmH2O   Plateau Pressure (cm H2O) 15 cmH2O   Dynamic Compliance (mL/cm H2O) 33 mL/cm H2O   Airway Resistance 13   Auto/ Intrinsic PEEP (cm H2O)   (no value)

## 2023-01-05 NOTE — PROGRESS NOTES
PROVIDER RESTRAINT FOR NON-VIOLENT BEHAVIOR FACE TO FACE EVALUATION    Patient's Immediate Situation:  Patient demonstrated the following behaviors: impulsive; pulls at critical tubes and lines. Does not respond to redirection.     Patient's Reaction to the intervention:  Does patient understand the reason for restraint/seclusion? Unable      Medical Condition:  Is there any evidence of compromise of Skin integrity, Respiratory, Cardiovascular, Musculoskeletal, Hydration?   No    Behavioral Condition:  In consultation with the RN, is there a need to continue this restraint or seclusion? Yes    See Restraint Flowsheet for complete restraint documentation and assessment.    Jennifer Wright, CNP  [facility] Pulmonary/Critical Care

## 2023-01-05 NOTE — PLAN OF CARE
Problem: Plan of Care - These are the overarching goals to be used throughout the patient stay.    Goal: Optimal Comfort and Wellbeing  Outcome: Progressing  Intervention: Monitor Pain and Promote Comfort  Recent Flowsheet Documentation  Taken 1/5/2023 0900 by Anya Albert RN  Pain Management Interventions: medication (see MAR)  Taken 1/5/2023 0845 by Anya Albert RN  Pain Management Interventions: medication (see MAR)  Taken 1/5/2023 0830 by Anya Albert RN  Pain Management Interventions: medication (see MAR)  Taken 1/5/2023 0800 by Anya Albert RN  Pain Management Interventions: medication (see MAR)   Pt transitioned to propofol , fentanyl increased to 100 mcgs hour with relief of agitation  Problem: Risk for Delirium  Goal: Improved Behavioral Control  Outcome: Progressing  Intervention: Prevent and Manage Agitation  Recent Flowsheet Documentation  Taken 1/5/2023 1200 by Anya Albert RN  Environment Familiarity/Consistency: daily routine followed  Taken 1/5/2023 0800 by Anya Albert RN  Environment Familiarity/Consistency: daily routine followed  Intervention: Minimize Safety Risk  Recent Flowsheet Documentation  Taken 1/5/2023 1200 by Ayna Albert RN  Enhanced Safety Measures: bed alarm set  Taken 1/5/2023 0800 by Anya Albert RN  Enhanced Safety Measures: bed alarm set  Goal: Improved Sleep  Outcome: Progressing     Problem: Anxiety Signs/Symptoms  Goal: Optimized Energy Level (Anxiety Signs/Symptoms)  Outcome: Progressing   Goal Outcome Evaluation:

## 2023-01-05 NOTE — PLAN OF CARE
[facility] - ICU    RN Progress Note:            Pertinent Assessments:      Please refer to flowsheet rows for full assessment     Please refer to the flow sheet.         Mobility Level:     Mobility level 1         Key Events - This Shift:                          Plan:     Continue to monitor.

## 2023-01-05 NOTE — PROGRESS NOTES
"Care Management Follow Up    Length of Stay (days): 3    Expected Discharge Date: 01/09/2023     Concerns to be Addressed:       Patient plan of care discussed at interdisciplinary rounds: Yes    Anticipated Discharge Disposition:       Anticipated Discharge Services:    Anticipated Discharge DME:      Patient/family educated on Medicare website which has current facility and service quality ratings:    Education Provided on the Discharge Plan:    Patient/Family in Agreement with the Plan:      Referrals Placed by CM/SW:    Private pay costs discussed: Not applicable    Additional Information:  Discussed patient in ICU rounds.     Per team discussion Cardiology had discussion with patient's family. NP planning to discuss with family today on GOC. Per RN - patient is agitated this morning. Has previously failed attempts at weaning off vent.     Social Hx:  \"Pt lives in a one level town home alone. Ind with movement, and uses walker. Bryant Thompson helps with transport, cleaning,and heavy lifting. Pt heats up microwave type meals for self.\"    CM will continue to follow care progression and aide in discharge planning as needed.     2:55 PM - Chart reviewed, NP discussion with patient family, transition to comfort cares and plan for compassionate extubation.     Dominga Gabriel RN        "

## 2023-01-05 NOTE — PROGRESS NOTES
Critical Care Progress Note  1/5/2023       Admit Date: 1/2/2023  ICU Admission Day: 1/3/2023  Code Status: DNR      Problem List:   Principal Problem:    Hypoxia  Active Problems:    Pericardial effusion    Pleural effusion    Acute HFrEF (heart failure with reduced ejection fraction) (H)      Major changes for today:    - meet with family to discuss goals of care    Plan by System:     Neuro/Psych: Obtunded on ICU admission likely secondary to acute hypercapneic respiratory failure. Sedation for vent synchrony and comfort.    - Goal RASS 0 to -1    - Sedate with Propofol and fentanyl - fighting vent and cannot safely lighten sedation.     Pulmonary: Acute hypoxic/hypercapneic respiratory failure with bilateral pleural effusions.    - Intubated 1/3 after failing BiPAP    - Failed SBT again today     - daily attempts.    - ETT at level of surekha - RT has pulled back 2-cm    CV: Severe symptomatic aortic stenosis; severely decompensated acute heart failure with preserved EF   - Historically has refused valve replacement. Agree with Dr. Wynne that she won't improve overall without intervention on her valve.    - Continue diuresis as she can tolerate - though now with HERLINDA and hypernatremia.     GI/: some degree of malnutrition - unable to determine severity.    - Diet: start tube feeding    - Last BM: prior to admission     - Bowel meds: start bowel medications.     Renal: HERLINDA, hypernatremia, hypokalemia - all secondary to diuresis.    - Lasix 20mg IV BID - limited here due to renal function and rising sodium.    - daily labs    - strict I/O     ID: no evidence of acute infection.    - Standard precautions.     Heme/Coag:  No acute issues     - DVT proph: heparin    Endocrine: hyperglycemia    - add sliding scale insulin coverage     Family: Updated Jay on the phone; he and Trevor will be in this afternoon and we will discuss goals further.       Dispo: ICU    Jennifer Wright, CNP  [facility] Pulmonary/Critical  "Care    Clinically Significant Risk Factors         # Hypokalemia: Lowest K = 2.7 mmol/L in last 2 days, will replace as needed  # Hypernatremia: Highest Na = 146 mmol/L in last 2 days, will monitor as appropriate        # Thrombocytopenia: Lowest platelets = 90 in last 2 days, will monitor for bleeding         # Cachexia: Estimated body mass index is 16.43 kg/m  as calculated from the following:    Height as of this encounter: 1.6 m (5' 2.99\").    Weight as of this encounter: 42 kg (92 lb 11.2 oz)., PRESENT ON ADMISSION        Code Status: No CPR- Pre-arrest intubation OK        Total critical care time: 35-min  I have personally provided 35 minutes of critical care time exclusive of time spent on separately billable procedures for acute hypoxic/hypercapneic respiratory failure in setting of decompensated heart failure. High risk for acute deterioration and death.    _______________________________________________________________    HPI: 87-year-old woman with history of aortic stenosis presented to the ER with progressive shortness of breath for several weeks.  Severity is increased from dyspnea with exertion to now shortness of breath at rest.  Worsening orthopnea and shortness of breath at night.  No fever, cough, or infectious symptoms.    ROS: SACHA - intubated and sedated    Events over last 24-hours: wakes up agitated and trying to pull tube out.   Objective:     Vitals:    01/05/23 1000 01/05/23 1015 01/05/23 1025 01/05/23 1100   BP: 133/62  128/58 135/65   BP Location: Left arm      Pulse: 53 53 53 53   Resp: 16 16 16 16   Temp:   98.2  F (36.8  C) 99  F (37.2  C)   TempSrc:       SpO2: 99% 100% 100% 98%   Weight:       Height:           Vent:   Day of Intubation: 1/3  Ready to wean? Yes  Vent Mode: CMV/AC  (Continuous Mandatory Ventilation/ Assist Control)  FiO2 (%): 30 %  Resp Rate (Set): 16 breaths/min  Tidal Volume (Set, mL): 360 mL  PEEP (cm H2O): 5 cmH2O  Resp: 16      Sedative Infusion: precedex and " fentanyl   Sedation vacation: No    I/O:     Intake/Output Summary (Last 24 hours) at 1/4/2023 1415  Last data filed at 1/4/2023 1200  Gross per 24 hour   Intake 1831.8 ml   Output 3175 ml   Net -1343.2 ml     Wt Readings from Last 3 Encounters:   01/04/23 42 kg (92 lb 11.2 oz)   02/15/22 46.3 kg (102 lb)   12/15/21 40.8 kg (90 lb)      Weight change:     Physical Exam:  Gen: sedated  HEENMT: AT/NC  NEURO: sedated  CARDIOVASCULAR: RRR S1S2; harsh systolic mumur noted.   PULMONARY: unlabored on full vent. Lungs clear and equal.   GASTROINTESTINAL: soft ntnd  INTEGUMENT: visible skin intact  PSYCH: sedated    Labs:   Recent Labs   Lab 01/05/23  0444 01/03/23  0716 01/02/23  1714   *   < > 143   CO2 34*   < > 27   BUN 27.2*   < > 23.8*   ALKPHOS  --   --  69   ALT  --   --  23   AST  --   --  22    < > = values in this interval not displayed.       Recent Labs   Lab 01/05/23  0444   WBC 12.7*   HGB 10.8*   HCT 34.3*   PLT 94*       Micro:   Cultures without growth    Imaging: all imaging personalized reviewed   1/5 CXR - Endotracheal tube tip is less than 1 cm from the surekha. Right-sided PICC tip in good position at cavoatrial junction. 2 enteric tubes traverse the esophagus and extend below diaphragm. Heart size magnified in AP projection. Decrease in right   pleural effusion with improved aeration right lung base. Moderate left effusion and left basilar consolidation overall similar. No pneumothorax.    1/3 Thoracentesis - 750mL removed from right chest.     1/3 XR Abd - The nasogastric tube tip in proximal port project over the body of stomach. A temperature probe tip projects over the distal esophagus. Internal jugular line tip projects over the lower SVC.     Enlarged cardiac silhouette. Bilateral pleural effusions. Nonobstructive gas pattern. Severe right hip dysplasia. Lumbar spine compression fractures.    1/3 CXR - Endotracheal tube 1.5 cm above surekha and could be retracted. Bibasilar atelectasis or  infiltrate and bilateral pleural effusions. Mitral annular calcification. Left shoulder arthroplasty. Degenerative change osseous structures. Remote left   rib fracture. Lower thoracic compression deformities.    Jennifer Wright, CNP  [facility] Pulmonary/Critical Care

## 2023-01-06 LAB
PATH REPORT.COMMENTS IMP SPEC: NORMAL
PATH REPORT.FINAL DX SPEC: NORMAL
PATH REPORT.GROSS SPEC: NORMAL
PATH REPORT.MICROSCOPIC SPEC OTHER STN: NORMAL
PATH REPORT.RELEVANT HX SPEC: NORMAL

## 2023-01-06 NOTE — DEATH PRONOUNCEMENT
MD DEATH PRONOUNCEMENT    Called to pronounce Shirley Phipps dead.    Physical Exam: Unresponsive to noxious stimuli, Spontaneous respirations absent, Breath sounds absent, Carotid pulse absent and Heart sounds absent    Patient was pronounced dead at 8:54 PM, January 5, 2023.

## 2023-01-07 NOTE — DISCHARGE SUMMARY
[facility] MEDICINE  DISCHARGE SUMMARY     Primary Care Physician: Desirae Thomson  Admission Date: 2023   Discharge Provider: Juan Diego Stern MD Discharge Date: 2023   Diet: None      Code Status: Prior   Activity: N/A         Condition at Discharge:      REASON FOR PRESENTATION(See Admission Note for Details)   Shortness of breath, decompensated CHF exacerbation    PRINCIPAL & ACTIVE DISCHARGE DIAGNOSES     Principal Problem:    Hypoxia  Active Problems:    Pericardial effusion    Pleural effusion    Acute HFrEF (heart failure with reduced ejection fraction) (H)  Acute respiratory failure with hypoxia requiring mechanical ventilation  Bilateral pleural effusions  Severe aortic stenosis  Cardiogenic shock  HERLINDA  Hypokalemia  Hypernatremia  Acute respiratory acidosis  Leukocytosis    PENDING LABS     Unresulted Labs Ordered in the Past 30 Days of this Admission     Date and Time Order Name Status Description    1/3/2023  7:57 AM Blood Culture Line, Other Preliminary     1/3/2023  7:57 AM Blood Culture Line, venous Preliminary     1/3/2023  2:15 AM Pleural fluid Aerobic Bacterial Culture Routine with Gram Stain Preliminary             PROCEDURES ( this hospitalization only)          RECOMMENDATIONS TO OUTPATIENT PROVIDER FOR F/U VISIT              DISPOSITION          SUMMARY OF HOSPITAL COURSE:      The patient is a 87-year-old female with a history of severe aortic stenosis, essential hypertension dyslipidemia and GERD who presented with shortness of breath and hypoxia and was admitted with for acute decompensated CHF in the setting of severe aortic stenosis and bilateral pleural effusions as seen on CT angiogram that showed a large right and moderate left pleural effusion with a moderate pericardiac effusion.  Patient was placed on BiPAP and then subsequently intubated.,  Ultrasound-guided thoracentesis of bilateral lung effusions was performed and PICC  line was placed and patient was initiated on diuresis with Lasix.  Patient was sedated with Versed and Precedex with fentanyl and propofol as adjuncts.  Patient had mild acute kidney injury hyponatremia and hypokalemia secondary to diuresis.  Patient failed spontaneous breathing trial.  Cardiology was consulted due to the patient's severe symptomatic AAS and decompensated CHF and was evaluated by Dr. Wynne who stated that without addressing the patient's aortic stenosis she will be unlikely to improve, this was discussed with her POA son Jay but stated that the patient over the years had said that she did not want to have any AV replacement surgery and that she would be at peace even if this caused her death.  Patient eventually went into cardiogenic shock due to her severe AS and diuresis and was placed on norepinephrine without improvement.  Son to me the decision to withdraw care patient was extubated and placed on comfort care and eventually  and was pronounced 2023 at 8:54 PM    Discharge Medications with Med changes:     Discharge Medication List as of 2023 11:18 PM      CONTINUE these medications which have NOT CHANGED    Details   acetaminophen (TYLENOL) 325 MG tablet Take 3 tablets (975 mg) by mouth 3 times daily, OTC      Calcium Carb-Cholecalciferol 500-125 MG-UNIT TABS Take 1 tablet by mouth 2 times daily , Historical      gabapentin (NEURONTIN) 300 MG capsule Take 300 mg by mouth 3 times daily , Historical      IBUPROFEN PO Take 400 mg by mouth every 8 hours as needed , Historical      melatonin 5 MG tablet Take 5 mg by mouth nightly as needed for sleep, Historical      mirtazapine (REMERON) 15 MG tablet Take 15 mg by mouth At Bedtime, Historical      oxybutynin (DITROPAN) 5 MG tablet Take 5 mg by mouth daily, Historical      Vitamin D3 (CHOLECALCIFEROL) 25 mcg (1000 units) tablet Take 1 tablet by mouth daily, Historical                   Rationale for medication changes:               Consults       [facility] EVALUATION IP CONSULT  OCCUPATIONAL THERAPY ADULT IP CONSULT  NUTRITION SERVICES ADULT IP CONSULT  CARE MANAGEMENT / SOCIAL WORK IP CONSULT  CARDIOLOGY IP CONSULT  INTENSIVIST IP CONSULT  VASCULAR ACCESS ADULT IP CONSULT  NUTRITION SERVICES ADULT IP CONSULT  PHARMACY IP CONSULT  NUTRITION SERVICES ADULT IP CONSULT  SPIRITUAL HEALTH SERVICES IP CONSULT    Immunizations given this encounter     Most Recent Immunizations   Administered Date(s) Administered     COVID-19 Vaccine 12+ (Pfizer) 09/30/2021     COVID-19 Vaccine Bivalent Booster 12+ (Pfizer) 10/11/2022     FLU 6-35 months 09/15/2010     FLUAD(HD)65+ QUAD 11/19/2022     Flu, Unspecified 10/04/2018     Influenza (High Dose) 3 valent vaccine 10/05/2019     Influenza (IIV3) PF 09/13/2012     Pneumococcal 23 valent 04/01/2005     Td (Adult), Adsorbed 02/21/2003           Anticoagulation Information      Recent INR results:   Recent Labs   Lab 01/03/23  0952 01/02/23  1714   INR 1.39* 1.15     Warfarin doses (if applicable) or name of other anticoagulant:       SIGNIFICANT IMAGING FINDINGS     Results for orders placed or performed during the hospital encounter of 01/02/23   CT Chest Pulmonary Embolism w Contrast    Impression    IMPRESSION:  1.  No pulmonary embolus.  2.  Large right and moderate left pleural effusions.  3.  Moderate pericardial effusion.  4.  Dependent atelectasis.  5.  Progression of thoracic spine compression fractures.   US Thoracentesis    Impression    IMPRESSION:  Status post right ultrasound-guided thoracentesis.    Reference CPT Code: 01940   XR Chest Port 1 View    Impression    IMPRESSION: Endotracheal tube 1.5 cm above surekha and could be retracted. Bibasilar atelectasis or infiltrate and bilateral pleural effusions. Mitral annular calcification. Left shoulder arthroplasty. Degenerative change osseous structures. Remote left   rib fracture. Lower thoracic compression deformities.   CT Head w/o  Contrast    Impression    IMPRESSION:  1.  No acute intracranial pathology.  2.  Mild to moderate volume loss and presumed chronic small vessel ischemic changes.   XR Abdomen Port 1 View    Impression    IMPRESSION: The nasogastric tube tip in proximal port project over the body of stomach. A temperature probe tip projects over the distal esophagus. Internal jugular line tip projects over the lower SVC.    Enlarged cardiac silhouette. Bilateral pleural effusions. Nonobstructive gas pattern. Severe right hip dysplasia. Lumbar spine compression fractures.   XR Wrist Left 2 Views    Impression    IMPRESSION: Old healed distal mildly angulated radius and ulna fractures. Diffuse bone demineralization. Mild osteoarthrosis at the radial aspect of the wrist and in the first MCP joint. Diffuse bone demineralization. Congenital osseous lunotriquetral   coalition. There is no evidence of an acute fracture.   XR Chest Port 1 View    Impression    IMPRESSION: Endotracheal tube tip is less than 1 cm from the surekha. Right-sided PICC tip in good position at cavoatrial junction. 2 enteric tubes traverse the esophagus and extend below diaphragm. Heart size magnified in AP projection. Decrease in right   pleural effusion with improved aeration right lung base. Moderate left effusion and left basilar consolidation overall similar. No pneumothorax.    Report was called to the ICU at 0940 hours.   Echocardiogram Complete   Result Value Ref Range    LVEF  > 65%        SIGNIFICANT LABORATORY FINDINGS     Most Recent 3 BMP's:Recent Labs   Lab Test 01/05/23  1129 01/05/23  0942 01/05/23  0807 01/05/23  0444 01/04/23  1614 01/04/23  1143 01/04/23  0837 01/04/23  0413 01/03/23  1258 01/03/23  0716   NA  --   --   --  146*  --   --   --  146*  --  145   POTASSIUM  --  3.5  --  3.4  --  4.8  --  2.7*  --  4.6   CHLORIDE  --   --   --  106  --   --   --  107  --  110*   CO2  --   --   --  34*  --   --   --  30*  --  25   BUN  --   --   --  27.2*   --   --   --  25.4*  --  25.4*   CR  --   --   --  1.01*  --   --   --  0.95  --  1.21*   ANIONGAP  --   --   --  6*  --   --   --  9  --  10   LEIF  --   --   --  9.0  --   --   --  9.0  --  9.4   *  --  123* 161*   < >  --    < > 177*   < > 131*    < > = values in this interval not displayed.           Discharge Orders     No discharge procedures on file.    Examination   Physical Exam      Wt Readings from Last 1 Encounters:   01/04/23 42 kg (92 lb 11.2 oz)       Not applicable    Please see EMR for more detailed significant labs, imaging, consultant notes etc.    IJuan Diego MD, personally saw the patient today and spent greater than 30 minutes discharging this patient.    Juan Diego Stern MD  [facility]    CC:Desirae Thomson

## 2023-01-08 LAB
BACTERIA BLD CULT: NO GROWTH
BACTERIA BLD CULT: NO GROWTH
BACTERIA PLR CULT: NO GROWTH
GRAM STAIN RESULT: NORMAL
GRAM STAIN RESULT: NORMAL

## 2023-11-20 NOTE — TELEPHONE ENCOUNTER
Last drink 11/16.    Plan:  Watch for withdrawal symptoms     Patient refused to schedule an appt this week. She said maybe the following week as she's supposed to have kidney surgery/procedure this week.    Also, patient would like to speak with nurse regarding pain medicine. She reports vomiting yesterday from morphine and/or ibuprofen combination. Patient stated she's allergic to codeine.

## 2023-12-29 NOTE — ED PROVIDER NOTES
EMERGENCY DEPARTMENT ENCOUNTER      NAME: Shirley Phipps  AGE: 86 year old female  YOB: 1935  MRN: [id number]  EVALUATION DATE & TIME: 2/14/2022  3:56 PM    PCP: Desirae Thomson    ED PROVIDER: Annel Michael PA-C      Chief Complaint   Patient presents with     Flank Pain         FINAL IMPRESSION:  1. Compression fracture of T3 vertebra, initial encounter (H)    2. Compression fracture of T8 vertebra, initial encounter (H)    3. Compression fracture of T11 vertebra, initial encounter (H)    4. Compression fracture of L1 vertebra, initial encounter (H)    5. Compression fracture of L2 vertebra, initial encounter (H)    6. Right flank pain          MEDICAL DECISION MAKING:    Pertinent Labs & Imaging studies reviewed. (See chart for details)  86 year old female with a pertinent history of osteoporosis, nephrolithiasis, acute kidney injury, UTI, aortic stenosis, and compression fractures or thoracic and lumbar spine presents to the Emergency Department for evaluation of right flank pain and onset of fever today. Diagnosed with UTI and multiple compression fractures last week. Denies any falls though notes she was reaching into her freezer and had pain in her back a few weeks ago. She denies any dysuria, hematuria, nausea, vomiting, diarrhea, extremity weakness or paresthesias, bowel/bladder dysfunction.     Vitals reviewed and notable for elevated blood pressure on arrival, likely secondary to pain. Improved with pain management. Afebrile. Normal heart rate. On exam, frail elderly female. Mild right flank and CVA tenderness. No midline spinal tenderness or overlying skin changes. 5/5 strength of bilateral lower extremities. Normal sensation. Abdomen is soft and non-tender. Lungs are CTAB. Differential diagnosis includes but not limited to muscle spasm/strain, vertebral fracture, epidural abscess/discitis, pyelonephritis, ureterolithiasis, hepatobiliary etiology.      EKG NSR. ST depressions in  [FreeTextEntry1] : 70F 7 mo s/p R TKA  Continue PT/HEP Progress activities as tolerated return 3 mo   We discussed the patient's progress and they were reminded of their antibiotic prophylaxis. We discussed continued physical therapy and/or a home exercise program. Questions about their knee replacement and future follow up were answered and discussed.    inferior and lateral leads though this is essentially identical to prior. Trop WNL (0.25), was elevated back in 08/2021. She has no chest pain or shortness of breath. Low suspicion for ACS. No leukocytosis. With report of fever at home, blood cultures obtained. Lactic acid WNL. CRP essentially normal at 1.2. Normal LFTs and lipase. Renal function WNL. UA with 250 leukocyte esterase, WBC clumps and bacteria present. She is allergic to IV contrast so I do not feel repeating CT abd pelvis WO is indicated at this time. Will treat for UTI/pyelonephritis though suspicion lower given normal white count and CRP. CT abd 2/9 with 3 mm nonobstructing lower pole right kidney stone. No hydronephrosis. Suspect pain related to her multiple compression fractures of thoracic and lumbar spine. She does have severe spinal canal narrowing of T11 due to retropulsion into spinal canal. Clinically, nothing to suggest spinal cord compromise. Discussed with Sayda with neurosurgery. She is a patient of their group already, likely not a surgical candidate. Has a TLSO at home but patient not wearing as it is too uncomfortable. Neurosurgery agrees with pain management and can formally consult in the morning. Patient comfortable with this plan.     0 minutes of critical care time     ED COURSE:  4:38 PM I met with the patient, obtained history, performed an initial exam, and discussed options and plan for diagnostics and treatment here in the ED.  7:14 PM I staffed the patient with Luís White MD, and updated the patient on plan for admission.   10:57 PM There are no more beds at [facility] so the patient will board here. I updated the hospitalist, Dr. DELEON regarding the situation.   11:43 PM Spoke with Sayda with neurosurgery. Pain management, activity as tolerated, neurosurgery will consult formally tomorrow.     At the conclusion of the encounter I discussed the results of all of the tests and the indication for admission. The questions  were answered. The patient acknowledged understanding and was agreeable with the care plan.     MEDICATIONS GIVEN IN THE EMERGENCY:  Medications   oxyCODONE (ROXICODONE) tablet 5 mg (has no administration in time range)   HYDROmorphone (DILAUDID) injection 0.5 mg (0.5 mg Intravenous Given 2/14/22 1734)   ondansetron (ZOFRAN) injection 4 mg (4 mg Intravenous Given 2/14/22 1732)   0.9% sodium chloride BOLUS (0 mLs Intravenous Stopped 2/14/22 1938)   levofloxacin (LEVAQUIN) infusion 500 mg (0 mg Intravenous Stopped 2/14/22 2045)   HYDROmorphone (DILAUDID) injection 0.5 mg (0.5 mg Intravenous Given 2/14/22 2048)       NEW PRESCRIPTIONS STARTED AT TODAY'S ER VISIT  New Prescriptions    No medications on file            =================================================================    HPI:    Patient information was obtained from: Patient    Use of Interpretor: N/A       Shirleyconstance Phipps is a 86 year old female with a pertinent history of osteoporosis, nephrolithiasis, acute kidney injury, UTI, aortic stenosis, and compression fractures or thoracic and lumbar spine who presents to this ED via EMS for evaluation of right sided flank pain.     Per chart review, patient was seen at [facility] urgency room first on 2/2/22 where she was found to have a urinary tract infection with hematuria. Placed on bactrim x 3 days. UC later grew Klebsiella resistant to ampicillin. Returned to urgency room on 02/09 with back pain. CT showed compression fractures to her lumbar (L1, L2) and thoracic vertebra (T3, T8, T11). She was placed on 3 days of Cipro.     Per patient, she endorses right flank pain that radiates into the right lower abdominal quadrant and a fever of 102 while at home today. Patient says she has known fractures to her vertebra and a urinary tract infection but is not completely sure which of these two is causing her pain. Denies any fall though notes she was reaching into her freezer and had pain in her back.  "She has had several urinary tract infections in the past and notes that this feels similar. Patient reports going into clinic twice in the past week but declined to come to the ED because of \"so much Covid in the hospitals right now\". She had not taken any medication for her pain prior to EMS arrival but has since been given 3 ibuprofen, 3 gabapentin, and IV morphine with some symptomatic relief. Patient is Covid vaccinated x 2 with booster. Denies dysuria, hematuria, frequency, chest pain, shortness of breath, bowel or bladder incontinence, pain or weakness in extremities or any other symptoms at this time.     REVIEW OF SYSTEMS:  Review of Systems   Constitutional: Positive for fever (102).   Respiratory: Negative for cough and shortness of breath.    Cardiovascular: Negative for chest pain.   Gastrointestinal: Negative for diarrhea, nausea and vomiting.   Genitourinary: Positive for flank pain (right). Negative for dysuria and hematuria.   Neurological: Negative for weakness and numbness.   All other systems reviewed and are negative.    PAST MEDICAL HISTORY:  Past Medical History:   Diagnosis Date     Anemia      Aortic stenosis      Arthritis      Bicuspid aortic valve      Bladder infection      Cancer (H)     SQUAMOUS 2X     Closed burst fracture of lumbar vertebra (H) 03/26/2019    L4 BURST FX WITH RETROPULSION     Dyslipidemia      Fall 03/26/2019     GERD (gastroesophageal reflux disease)      History of transfusion     SHE DOESN'T THINK SHE HAD A TRANSFUSION REACTION     Hypertension        PAST SURGICAL HISTORY:  Past Surgical History:   Procedure Laterality Date     COMBINED CYSTOSCOPY, INSERT STENT URETER(S) Right 8/4/2021    Procedure: CYSTOURETEROSCOPY, RETROGRADE PYLEOGRAM RIGHT ;  Surgeon: Marbin Liu MD;  Location: [facility] [place] OR     OTHER SURGICAL HISTORY Right     surgery for prior wrist fracture     OTHER SURGICAL HISTORY Right     surgery for prior knee fracture     [facility]  12/25/2016    "       ZZC RECONSTR TOTAL SHOULDER IMPLANT Left 12/26/2016    Procedure: LEFT REVERSE TOTAL SHOULDER ARTHROPLASTY;  Surgeon: Maurilio Marcelino MD;  Location: [facility];  Service: Orthopedics           CURRENT MEDICATIONS:      Current Facility-Administered Medications:      oxyCODONE (ROXICODONE) tablet 5 mg, 5 mg, Oral, Once, Annel Michael PA-C     romosozumab-aqqg (EVENITY) injection 210 mg, 210 mg, Subcutaneous, Q30 Days, Ashok Reyes MD    Current Outpatient Medications:      Calcium Carb-Cholecalciferol (CALCIUM 500 + D) 500-125 MG-UNIT TABS, Take 1 tablet by mouth 2 times daily , Disp: , Rfl:      gabapentin (NEURONTIN) 300 MG capsule, Take 300 mg by mouth 3 times daily , Disp: , Rfl:      IBUPROFEN PO, Take 400 mg by mouth every 8 hours as needed , Disp: , Rfl:      melatonin 5 MG tablet, Take 5 mg by mouth nightly as needed for sleep, Disp: , Rfl:      Vitamin D3 (CHOLECALCIFEROL) 25 mcg (1000 units) tablet, Take 1 tablet by mouth daily, Disp: , Rfl:       ALLERGIES:  Allergies   Allergen Reactions     Allopurinol Other (See Comments)     Cefdinir Hives     Clindamycin Nausea     C.Diff     Codeine Nausea and Vomiting     Tolerates oral morphine.     Flagyl [Metronidazole] Angioedema     Sulfa (Sulfonamide Antibiotics) [Sulfa Drugs] Nausea     Canker sores     Tramadol Unknown     Upset stomach       FAMILY HISTORY:  Family History   Problem Relation Age of Onset     Diabetes Type 2  Other      Breast Cancer Mother      Diabetes Father      Diabetes Brother        SOCIAL HISTORY:   Social History     Socioeconomic History     Marital status:      Spouse name: Not on file     Number of children: Not on file     Years of education: Not on file     Highest education level: Not on file   Occupational History     Not on file   Tobacco Use     Smoking status: Never Smoker     Smokeless tobacco: Never Used   Substance and Sexual Activity     Alcohol use: Yes     Alcohol/week: 7.0 standard  drinks     Comment: Alcoholic Drinks/day: 1 shot of bee daily     Drug use: No     Sexual activity: Not on file   Other Topics Concern     Parent/sibling w/ CABG, MI or angioplasty before 65F 55M? Not Asked   Social History Narrative     Not on file     Social Determinants of Health     Financial Resource Strain: Not on file   Food Insecurity: Not on file   Transportation Needs: Not on file   Physical Activity: Not on file   Stress: Not on file   Social Connections: Not on file   Intimate Partner Violence: Not on file   Housing Stability: Not on file       VITALS:  Patient Vitals for the past 24 hrs:   BP Temp Temp src Pulse Resp SpO2 Weight   02/14/22 1630 129/56 -- -- 79 21 96 % --   02/14/22 1606 (!) 158/77 99.1  F (37.3  C) Oral 88 20 97 % 43.1 kg (95 lb)       PHYSICAL EXAM  Constitutional: Well developed, frail, elderly, uncomfortable appearing.  HENT: Normocephalic, Atraumatic, Bilateral external ears normal, Oropharynx normal, mucous membranes moist, Nose normal.   Neck: Normal range of motion, No tenderness, Supple, No stridor.  Eyes: PERRL, Conjunctiva normal, No discharge.   Respiratory: Normal breath sounds, No respiratory distress, No wheezing, Speaks full sentences easily. No cough.  Cardiovascular: Normal heart rate, Regular rhythm, No murmurs, No rubs, No gallops. Chest wall nontender.  GI: Soft, No tenderness, No masses. Mild CVA tenderness. No rebound or guarding.   Musculoskeletal: 2+ DP pulses bilaterally. No edema. No cyanosis, No clubbing. Good range of motion in all major joints. Straight leg raise negative bilaterally. No midline or paraspinal tenderness. No overlying skin changes. No foot drop. 5/5 strength for the bilateral hip flexors, knee flexors/extensors, ankle dorsiflexors/plantar flexors, great toe extensors, ankle evertors/invertors.  Integument: Warm, Dry, No erythema, No rash. No petechiae.  Neurologic: Alert & oriented x 3, Normal motor function, Normal sensory function, No  focal deficits noted.  Psychiatric: Affect normal, Judgment normal, Mood normal. Cooperative.    LAB:  All pertinent labs reviewed and interpreted.  Recent Results (from the past 24 hour(s))   Asymptomatic COVID-19 Virus (Coronavirus) by PCR Nasopharyngeal    Collection Time: 02/14/22  5:10 PM    Specimen: Nasopharyngeal; Swab   Result Value Ref Range    SARS CoV2 PCR Negative Negative   Basic metabolic panel    Collection Time: 02/14/22  5:22 PM   Result Value Ref Range    Sodium 137 136 - 145 mmol/L    Potassium 4.5 3.5 - 5.0 mmol/L    Chloride 108 (H) 98 - 107 mmol/L    Carbon Dioxide (CO2) 26 22 - 31 mmol/L    Anion Gap 3 (L) 5 - 18 mmol/L    Urea Nitrogen 20 8 - 28 mg/dL    Creatinine 0.77 0.60 - 1.10 mg/dL    Calcium 9.4 8.5 - 10.5 mg/dL    Glucose 92 70 - 125 mg/dL    GFR Estimate 75 >60 mL/min/1.73m2   Hepatic function panel    Collection Time: 02/14/22  5:22 PM   Result Value Ref Range    Bilirubin Total 0.5 0.0 - 1.0 mg/dL    Bilirubin Direct 0.2 <=0.5 mg/dL    Protein Total 6.4 6.0 - 8.0 g/dL    Albumin 3.6 3.5 - 5.0 g/dL    Alkaline Phosphatase 48 45 - 120 U/L    AST 13 0 - 40 U/L    ALT <9 0 - 45 U/L   Lipase    Collection Time: 02/14/22  5:22 PM   Result Value Ref Range    Lipase 11 0 - 52 U/L   CRP inflammation    Collection Time: 02/14/22  5:22 PM   Result Value Ref Range    CRP 1.2 (H) 0.0-<0.8 mg/dL   Lactic acid whole blood    Collection Time: 02/14/22  5:22 PM   Result Value Ref Range    Lactic Acid 0.7 0.7 - 2.0 mmol/L   Troponin I (now)    Collection Time: 02/14/22  5:22 PM   Result Value Ref Range    Troponin I 0.25 0.00 - 0.29 ng/mL   CBC with platelets and differential    Collection Time: 02/14/22  5:22 PM   Result Value Ref Range    WBC Count 5.7 4.0 - 11.0 10e3/uL    RBC Count 3.55 (L) 3.80 - 5.20 10e6/uL    Hemoglobin 10.5 (L) 11.7 - 15.7 g/dL    Hematocrit 33.2 (L) 35.0 - 47.0 %    MCV 94 78 - 100 fL    MCH 29.6 26.5 - 33.0 pg    MCHC 31.6 31.5 - 36.5 g/dL    RDW 13.1 10.0 - 15.0 %     Platelet Count 94 (L) 150 - 450 10e3/uL    % Neutrophils 67 %    % Lymphocytes 16 %    % Monocytes 16 %    % Eosinophils 0 %    % Basophils 0 %    % Immature Granulocytes 1 %    NRBCs per 100 WBC 0 <1 /100    Absolute Neutrophils 3.9 1.6 - 8.3 10e3/uL    Absolute Lymphocytes 0.9 0.8 - 5.3 10e3/uL    Absolute Monocytes 0.9 0.0 - 1.3 10e3/uL    Absolute Eosinophils 0.0 0.0 - 0.7 10e3/uL    Absolute Basophils 0.0 0.0 - 0.2 10e3/uL    Absolute Immature Granulocytes 0.1 <=0.4 10e3/uL    Absolute NRBCs 0.0 10e3/uL   Extra Blue Top Tube    Collection Time: 02/14/22  5:25 PM   Result Value Ref Range    Hold Specimen JIC    Extra Red Top Tube    Collection Time: 02/14/22  5:25 PM   Result Value Ref Range    Hold Specimen JIC    UA with Microscopic reflex to Culture    Collection Time: 02/14/22  5:44 PM    Specimen: Urine, Catheter   Result Value Ref Range    Color Urine Yellow Colorless, Straw, Light Yellow, Yellow    Appearance Urine Turbid (A) Clear    Glucose Urine Negative Negative mg/dL    Bilirubin Urine Negative Negative    Ketones Urine Negative Negative mg/dL    Specific Gravity Urine 1.021 1.001 - 1.030    Blood Urine 0.5 mg/dL (A) Negative    pH Urine 5.5 5.0 - 7.0    Protein Albumin Urine 20  (A) Negative mg/dL    Urobilinogen Urine <2.0 <2.0 mg/dL    Nitrite Urine Negative Negative    Leukocyte Esterase Urine 250 Patsy/uL (A) Negative    Bacteria Urine Few (A) None Seen /HPF    WBC Clumps Urine Present (A) None Seen /HPF    Mucus Urine Present (A) None Seen /LPF    RBC Urine 73 (H) <=2 /HPF    WBC Urine 29 (H) <=5 /HPF         RADIOLOGY:  Reviewed all pertinent imaging. Please see official radiology report.  No orders to display     CT lumbar and thoracic spine 02/09/22   IMPRESSION:   THORACIC SPINE CT:   1.  20-30% inferior endplate compression fracture of T8, age indeterminate but new compared to previous thoracic spine MRI 10/21/2020. The fracture planes are well seen, so therefore this is likely  acute/subacute rather than chronic.   2.  20% superior endplate compression deformity of T3, age indeterminate but new compared to prior thoracic spine MRI 10/21/2020.   3.  No significant change in the severe compression fracture of T11.   4.  Moderate to severe spinal canal narrowing at the level of mid T11 due to the retropulsion of the posterior cortex of T11 into the spinal canal.     LUMBAR SPINE CT:   1.  Interval worsening of the now moderate severe compression fracture of L2 with greater than 70% vertebral body height loss, previously greater than 50-60% compared to lumbar spine CT 08/17/2021.   2.  No significant change in the severe greater than 80-90% vertebral body height loss compression deformity of L1.   3.  Mild spinal canal narrowing from T12-L1 to L2-L3.       EKG:      Performed at: 1705    Impression: NSR, RBBB, nonspecific ST abnormality    Rate: 78  Rhythm: sinus  Axis: -13  ME Interval: 178  QRS Interval: 130  QTc Interval: 460  ST Changes: ST depressions in inferior and lateral leads, similar to prior  Comparison: 08/07/21 No significant change.     I have independently reviewed and interpreted the EKG(s) documented above.  Reviewed with Dr. White      I, Alvaro Morocho, am serving as a scribe to document services personally performed by Annel Michael PA-C based on my observation and the provider's statements to me. I, Annel Michael PA-C attest that Alvaro Morocho is acting in a scribe capacity, has observed my performance of the services and has documented them in accordance with my direction.    Annel Michael PA-C  Emergency Medicine  [facility]  2/14/2022       Annel Michael PA-C  02/15/22 0006

## (undated) DEVICE — KIT ENDO FIRST STEP DISINFECTANT 200ML W/POUCH EP-4

## (undated) DEVICE — A3 SUPPLIES- SEE NURSING INFO PAGE

## (undated) DEVICE — Device

## (undated) DEVICE — TUBING SUCTION MEDI-VAC 1/4"X20' N620A - HE

## (undated) DEVICE — GUIDEWIRE BENTSON FLEX TIP 0.035"X150CM M0066201250

## (undated) DEVICE — GLOVE SURG PI ULTRA TOUCH M SZ 8 LF

## (undated) DEVICE — CUSTOM PACK CYSTO PREFERRED SOT5BCYHEA

## (undated) DEVICE — SUCTION MANIFOLD NEPTUNE 2 SYS 1 PORT 702-025-000

## (undated) DEVICE — MAT FLOOR SURGICAL 40X38 0702140238

## (undated) DEVICE — ADAPTER CATH CHECK-FLO 9FR FLL 050885 G15476

## (undated) DEVICE — GOWN XLG DISP 9545

## (undated) DEVICE — TUBING SET THERMEDX UROLOGY SGL USE LL0006

## (undated) DEVICE — SOL WATER IRRIG 1000ML BOTTLE 2F7114

## (undated) DEVICE — BASKET NITINOL TIPLESS HALO  1.5FRX120CM 554120

## (undated) DEVICE — PREP POVIDONE-IODINE 7.5% SCRUB 4OZ BOTTLE MDS093945

## (undated) DEVICE — SOL WATER IRRIG 3000ML BAG 2B7117

## (undated) DEVICE — CONNECTOR URETERAL CATH 140000